# Patient Record
Sex: FEMALE | Race: WHITE | NOT HISPANIC OR LATINO | Employment: OTHER | URBAN - METROPOLITAN AREA
[De-identification: names, ages, dates, MRNs, and addresses within clinical notes are randomized per-mention and may not be internally consistent; named-entity substitution may affect disease eponyms.]

---

## 2017-01-02 ENCOUNTER — OFFICE VISIT (OUTPATIENT)
Dept: URGENT CARE | Facility: PHYSICIAN GROUP | Age: 77
End: 2017-01-02
Payer: MEDICARE

## 2017-01-02 VITALS
RESPIRATION RATE: 16 BRPM | BODY MASS INDEX: 24.81 KG/M2 | HEIGHT: 61 IN | HEART RATE: 52 BPM | WEIGHT: 131.4 LBS | SYSTOLIC BLOOD PRESSURE: 122 MMHG | TEMPERATURE: 98.2 F | DIASTOLIC BLOOD PRESSURE: 72 MMHG | OXYGEN SATURATION: 100 %

## 2017-01-02 DIAGNOSIS — M10.9 GOUT, ARTHRITIS: ICD-10-CM

## 2017-01-02 PROCEDURE — 99214 OFFICE O/P EST MOD 30 MIN: CPT | Performed by: PHYSICIAN ASSISTANT

## 2017-01-02 RX ORDER — METHYLPREDNISOLONE 4 MG/1
TABLET ORAL
Qty: 21 TAB | Refills: 0 | Status: SHIPPED | OUTPATIENT
Start: 2017-01-02 | End: 2017-05-22 | Stop reason: SDUPTHER

## 2017-01-02 NOTE — PATIENT INSTRUCTIONS
Gout  Gout is an inflammatory arthritis caused by a buildup of uric acid crystals in the joints. Uric acid is a chemical that is normally present in the blood. When the level of uric acid in the blood is too high it can form crystals that deposit in your joints and tissues. This causes joint redness, soreness, and swelling (inflammation). Repeat attacks are common. Over time, uric acid crystals can form into masses (tophi) near a joint, destroying bone and causing disfigurement. Gout is treatable and often preventable.  CAUSES   The disease begins with elevated levels of uric acid in the blood. Uric acid is produced by your body when it breaks down a naturally found substance called purines. Certain foods you eat, such as meats and fish, contain high amounts of purines. Causes of an elevated uric acid level include:  · Being passed down from parent to child (heredity).  · Diseases that cause increased uric acid production (such as obesity, psoriasis, and certain cancers).  · Excessive alcohol use.  · Diet, especially diets rich in meat and seafood.  · Medicines, including certain cancer-fighting medicines (chemotherapy), water pills (diuretics), and aspirin.  · Chronic kidney disease. The kidneys are no longer able to remove uric acid well.  · Problems with metabolism.  Conditions strongly associated with gout include:  · Obesity.  · High blood pressure.  · High cholesterol.  · Diabetes.  Not everyone with elevated uric acid levels gets gout. It is not understood why some people get gout and others do not. Surgery, joint injury, and eating too much of certain foods are some of the factors that can lead to gout attacks.  SYMPTOMS   · An attack of gout comes on quickly. It causes intense pain with redness, swelling, and warmth in a joint.  · Fever can occur.  · Often, only one joint is involved. Certain joints are more commonly involved:  ¨ Base of the big toe.  ¨ Knee.  ¨ Ankle.  ¨ Wrist.  ¨ Finger.  Without  treatment, an attack usually goes away in a few days to weeks. Between attacks, you usually will not have symptoms, which is different from many other forms of arthritis.  DIAGNOSIS   Your caregiver will suspect gout based on your symptoms and exam. In some cases, tests may be recommended. The tests may include:  · Blood tests.  · Urine tests.  · X-rays.  · Joint fluid exam. This exam requires a needle to remove fluid from the joint (arthrocentesis). Using a microscope, gout is confirmed when uric acid crystals are seen in the joint fluid.  TREATMENT   There are two phases to gout treatment: treating the sudden onset (acute) attack and preventing attacks (prophylaxis).  · Treatment of an Acute Attack.  ¨ Medicines are used. These include anti-inflammatory medicines or steroid medicines.  ¨ An injection of steroid medicine into the affected joint is sometimes necessary.  ¨ The painful joint is rested. Movement can worsen the arthritis.  ¨ You may use warm or cold treatments on painful joints, depending which works best for you.  · Treatment to Prevent Attacks.  ¨ If you suffer from frequent gout attacks, your caregiver may advise preventive medicine. These medicines are started after the acute attack subsides. These medicines either help your kidneys eliminate uric acid from your body or decrease your uric acid production. You may need to stay on these medicines for a very long time.  ¨ The early phase of treatment with preventive medicine can be associated with an increase in acute gout attacks. For this reason, during the first few months of treatment, your caregiver may also advise you to take medicines usually used for acute gout treatment. Be sure you understand your caregiver's directions. Your caregiver may make several adjustments to your medicine dose before these medicines are effective.  ¨ Discuss dietary treatment with your caregiver or dietitian. Alcohol and drinks high in sugar and fructose and foods  such as meat, poultry, and seafood can increase uric acid levels. Your caregiver or dietitian can advise you on drinks and foods that should be limited.  HOME CARE INSTRUCTIONS   · Do not take aspirin to relieve pain. This raises uric acid levels.  · Only take over-the-counter or prescription medicines for pain, discomfort, or fever as directed by your caregiver.  · Rest the joint as much as possible. When in bed, keep sheets and blankets off painful areas.  · Keep the affected joint raised (elevated).  · Apply warm or cold treatments to painful joints. Use of warm or cold treatments depends on which works best for you.  · Use crutches if the painful joint is in your leg.  · Drink enough fluids to keep your urine clear or pale yellow. This helps your body get rid of uric acid. Limit alcohol, sugary drinks, and fructose drinks.  · Follow your dietary instructions. Pay careful attention to the amount of protein you eat. Your daily diet should emphasize fruits, vegetables, whole grains, and fat-free or low-fat milk products. Discuss the use of coffee, vitamin C, and cherries with your caregiver or dietitian. These may be helpful in lowering uric acid levels.  · Maintain a healthy body weight.  SEEK MEDICAL CARE IF:   · You develop diarrhea, vomiting, or any side effects from medicines.  · You do not feel better in 24 hours, or you are getting worse.  SEEK IMMEDIATE MEDICAL CARE IF:   · Your joint becomes suddenly more tender, and you have chills or a fever.  MAKE SURE YOU:   · Understand these instructions.  · Will watch your condition.  · Will get help right away if you are not doing well or get worse.     This information is not intended to replace advice given to you by your health care provider. Make sure you discuss any questions you have with your health care provider.     Document Released: 12/15/2001 Document Revised: 01/08/2016 Document Reviewed: 07/31/2013  [employer] Interactive Patient Education ©2016  [employer]

## 2017-01-02 NOTE — MR AVS SNAPSHOT
"        Danielle Jon   2017 8:00 AM   Office Visit   MRN: [id number]    Department:  [facility]   Dept Phone:  [phone]    Description:  Female : 1940   Provider:  Layla Hurd PA-C           Reason for Visit     Foot Problem hx bunions, left foot shart pains x1week      Allergies as of 2017     Allergen Noted Reactions    Niacin 2016   Rash, Swelling    Rash/itching/swelling      You were diagnosed with     Gout, arthritis   [[id number]]         Vital Signs     Blood Pressure Pulse Temperature Respirations Height Weight    122/72 mmHg 52 36.8 °C (98.2 °F) 16 1.549 m (5' 0.98\") 59.603 kg (131 lb 6.4 oz)    Body Mass Index Oxygen Saturation Smoking Status             24.84 kg/m2 100% Never Smoker          Basic Information     Date Of Birth Sex Race Ethnicity Preferred Language    1940 Female White Non- English      Problem List              ICD-10-CM Priority Class Noted - Resolved    Essential hypertension I10   2016 - Present    Hypothyroidism E03.9   2016 - Present    Routine health maintenance Z00.00   2016 - Present      Health Maintenance        Date Due Completion Dates    IMM DTaP/Tdap/Td Vaccine (1 - Tdap) 9/15/1960 ---    PAP SMEAR 9/15/1962 ---    MAMMOGRAM 9/15/1981 ---    COLONOSCOPY 9/15/1991 ---    IMM ZOSTER VACCINE 9/15/2001 ---    BONE DENSITY 9/15/2006 ---    IMM PNEUMOCOCCAL 65+ (ADULT) LOW/MEDIUM RISK SERIES (1 of 2 - PCV13) 9/15/2006 ---    IMM INFLUENZA (1) 2016 ---            Current Immunizations     No immunizations on file.      Below and/or attached are the medications your provider expects you to take. Review all of your home medications and newly ordered medications with your provider and/or pharmacist. Follow medication instructions as directed by your provider and/or pharmacist. Please keep your medication list with you and share with your provider. Update the information when medications are discontinued, doses are " changed, or new medications (including over-the-counter products) are added; and carry medication information at all times in the event of emergency situations     Allergies:  NIACIN - Rash,Swelling               Medications  Valid as of: January 02, 2017 -  8:27 AM    Generic Name Brand Name Tablet Size Instructions for use    Aspirin (Tab) aspirin 81 MG Take 81 mg by mouth every day.        Atenolol (Tab) TENORMIN 50 MG TAKE 1 TABLET BY MOUTH TWICE DAILY**GENERIC FOR TENORMIN        Cholecalciferol (Cap) Vitamin D3 1000 UNITS Take 2,000 Units by mouth every day.        Ibuprofen (Tab) MOTRIN 200 MG Take 200 mg by mouth every 6 hours as needed.        Levothyroxine Sodium (Tab) SYNTHROID 50 MCG Take 1 Tab by mouth Every morning on an empty stomach. Indications: Underactive Thyroid        Losartan Potassium (Tab) COZAAR 100 MG Take 1 Tab by mouth every day.        Triamterene-HCTZ (Cap) triamterene/hctz 50-25 MG Take 1 Cap by mouth every morning.        Triamterene-HCTZ (Cap) MAXZIDE-25/DYAZIDE 37.5-25 MG Take 1 Cap by mouth every morning.        .                 Medicines prescribed today were sent to:     [id number] - [place], [place] - [id number] [facility] [address]    Phone: [phone] Fax: [phone]    Open 24 Hours?: No      Medication refill instructions:       If your prescription bottle indicates you have medication refills left, it is not necessary to call your provider’s office. Please contact your pharmacy and they will refill your medication.    If your prescription bottle indicates you do not have any refills left, you may request refills at any time through one of the following ways: The online [employer] system (except Urgent Care), by calling your provider’s office, or by asking your pharmacy to contact your provider’s office with a refill request. Medication refills are processed only during regular business hours and may not be available until the next  business day. Your provider may request additional information or to have a follow-up visit with you prior to refilling your medication.   *Please Note: Medication refills are assigned a new Rx number when refilled electronically. Your pharmacy may indicate that no refills were authorized even though a new prescription for the same medication is available at the pharmacy. Please request the medicine by name with the pharmacy before contacting your provider for a refill.        Instructions    Gout  Gout is an inflammatory arthritis caused by a buildup of uric acid crystals in the joints. Uric acid is a chemical that is normally present in the blood. When the level of uric acid in the blood is too high it can form crystals that deposit in your joints and tissues. This causes joint redness, soreness, and swelling (inflammation). Repeat attacks are common. Over time, uric acid crystals can form into masses (tophi) near a joint, destroying bone and causing disfigurement. Gout is treatable and often preventable.  CAUSES   The disease begins with elevated levels of uric acid in the blood. Uric acid is produced by your body when it breaks down a naturally found substance called purines. Certain foods you eat, such as meats and fish, contain high amounts of purines. Causes of an elevated uric acid level include:  · Being passed down from parent to child (heredity).  · Diseases that cause increased uric acid production (such as obesity, psoriasis, and certain cancers).  · Excessive alcohol use.  · Diet, especially diets rich in meat and seafood.  · Medicines, including certain cancer-fighting medicines (chemotherapy), water pills (diuretics), and aspirin.  · Chronic kidney disease. The kidneys are no longer able to remove uric acid well.  · Problems with metabolism.  Conditions strongly associated with gout include:  · Obesity.  · High blood pressure.  · High cholesterol.  · Diabetes.  Not everyone with elevated uric acid  levels gets gout. It is not understood why some people get gout and others do not. Surgery, joint injury, and eating too much of certain foods are some of the factors that can lead to gout attacks.  SYMPTOMS   · An attack of gout comes on quickly. It causes intense pain with redness, swelling, and warmth in a joint.  · Fever can occur.  · Often, only one joint is involved. Certain joints are more commonly involved:  ¨ Base of the big toe.  ¨ Knee.  ¨ Ankle.  ¨ Wrist.  ¨ Finger.  Without treatment, an attack usually goes away in a few days to weeks. Between attacks, you usually will not have symptoms, which is different from many other forms of arthritis.  DIAGNOSIS   Your caregiver will suspect gout based on your symptoms and exam. In some cases, tests may be recommended. The tests may include:  · Blood tests.  · Urine tests.  · X-rays.  · Joint fluid exam. This exam requires a needle to remove fluid from the joint (arthrocentesis). Using a microscope, gout is confirmed when uric acid crystals are seen in the joint fluid.  TREATMENT   There are two phases to gout treatment: treating the sudden onset (acute) attack and preventing attacks (prophylaxis).  · Treatment of an Acute Attack.  ¨ Medicines are used. These include anti-inflammatory medicines or steroid medicines.  ¨ An injection of steroid medicine into the affected joint is sometimes necessary.  ¨ The painful joint is rested. Movement can worsen the arthritis.  ¨ You may use warm or cold treatments on painful joints, depending which works best for you.  · Treatment to Prevent Attacks.  ¨ If you suffer from frequent gout attacks, your caregiver may advise preventive medicine. These medicines are started after the acute attack subsides. These medicines either help your kidneys eliminate uric acid from your body or decrease your uric acid production. You may need to stay on these medicines for a very long time.  ¨ The early phase of treatment with preventive  medicine can be associated with an increase in acute gout attacks. For this reason, during the first few months of treatment, your caregiver may also advise you to take medicines usually used for acute gout treatment. Be sure you understand your caregiver's directions. Your caregiver may make several adjustments to your medicine dose before these medicines are effective.  ¨ Discuss dietary treatment with your caregiver or dietitian. Alcohol and drinks high in sugar and fructose and foods such as meat, poultry, and seafood can increase uric acid levels. Your caregiver or dietitian can advise you on drinks and foods that should be limited.  HOME CARE INSTRUCTIONS   · Do not take aspirin to relieve pain. This raises uric acid levels.  · Only take over-the-counter or prescription medicines for pain, discomfort, or fever as directed by your caregiver.  · Rest the joint as much as possible. When in bed, keep sheets and blankets off painful areas.  · Keep the affected joint raised (elevated).  · Apply warm or cold treatments to painful joints. Use of warm or cold treatments depends on which works best for you.  · Use crutches if the painful joint is in your leg.  · Drink enough fluids to keep your urine clear or pale yellow. This helps your body get rid of uric acid. Limit alcohol, sugary drinks, and fructose drinks.  · Follow your dietary instructions. Pay careful attention to the amount of protein you eat. Your daily diet should emphasize fruits, vegetables, whole grains, and fat-free or low-fat milk products. Discuss the use of coffee, vitamin C, and cherries with your caregiver or dietitian. These may be helpful in lowering uric acid levels.  · Maintain a healthy body weight.  SEEK MEDICAL CARE IF:   · You develop diarrhea, vomiting, or any side effects from medicines.  · You do not feel better in 24 hours, or you are getting worse.  SEEK IMMEDIATE MEDICAL CARE IF:   · Your joint becomes suddenly more tender, and you  have chills or a fever.  MAKE SURE YOU:   · Understand these instructions.  · Will watch your condition.  · Will get help right away if you are not doing well or get worse.     This information is not intended to replace advice given to you by your health care provider. Make sure you discuss any questions you have with your health care provider.     Document Released: 12/15/2001 Document Revised: 01/08/2016 Document Reviewed: 07/31/2013  [employer] Interactive Patient Education ©2016 [employer]            [employer] Access Code: Activation code not generated  Current [employer] Status: Active

## 2017-01-02 NOTE — PROGRESS NOTES
Chief Complaint   Patient presents with   • Foot Problem     hx bunions, left foot shart pains x1week       HISTORY OF PRESENT ILLNESS: Patient is a 76 y.o. female who presents today for 1 week of waxing and waning severity of left foot pain.  Started in the ball of her foot and radiates throughout her foot and into her lower leg.  She was suspicious of gout due to the location at the base of big toe and her  having similar history.   She has never had gout before.  She has no hx of DM and did not injure her foot in any way.  She has not noticed rash.  No numbness or tingling.  Hurts much more to walk on and put pressure than anything else.  She did eat seafood and drink more alcohol slightly than normal due to the holidays.  She states that she did take NSAIDs and there was mild improvement overall.      Patient Active Problem List    Diagnosis Date Noted   • Routine health maintenance 02/21/2016   • Essential hypertension 02/19/2016   • Hypothyroidism 02/19/2016       Allergies:Niacin    Current Outpatient Prescriptions Ordered in [facility]   Medication Sig Dispense Refill   • losartan (COZAAR) 100 MG Tab Take 1 Tab by mouth every day. 90 Tab 0   • atenolol (TENORMIN) 50 MG Tab TAKE 1 TABLET BY MOUTH TWICE DAILY**GENERIC FOR TENORMIN 180 Tab 1   • triamterene/hctz (MAXZIDE-25/DYAZIDE) 37.5-25 MG Cap Take 1 Cap by mouth every morning. 90 Cap 3   • levothyroxine (SYNTHROID) 50 MCG Tab Take 1 Tab by mouth Every morning on an empty stomach. Indications: Underactive Thyroid 90 Tab 3   • aspirin 81 MG tablet Take 81 mg by mouth every day.     • ibuprofen (MOTRIN) 200 MG Tab Take 200 mg by mouth every 6 hours as needed.     • Cholecalciferol (VITAMIN D3) 1000 UNITS Cap Take 2,000 Units by mouth every day.     • triamterene/hctz 50-25 MG Cap Take 1 Cap by mouth every morning.       No current [facility]-ordered facility-administered medications on file.       Past Medical History   Diagnosis Date   • Hypertension    • Thyroid  "disease        Social History   Substance Use Topics   • Smoking status: Never Smoker    • Smokeless tobacco: Never Used   • Alcohol Use: 0.0 oz/week     0 Standard drinks or equivalent per week      Comment: occasionally        Family Status   Relation Status Death Age   • Mother  89     chf   • Father  69     emphem   • Sister Alive      encephalitis, memory care center   • Brother Alive      Family History   Problem Relation Age of Onset   • Hypertension Mother    • Heart Disease Mother      chf   • Lung Disease Father    • Hypertension Sister    • Hyperlipidemia Sister    • Hypertension Brother        ROS:  Review of Systems   Constitutional: Negative for fever, chills, weight loss and malaise/fatigue.   HENT: Negative for ear pain, nosebleeds, congestion, sore throat and neck pain.    Eyes: Negative for blurred vision.   Respiratory: Negative for cough, sputum production, shortness of breath and wheezing.    Cardiovascular: Negative for chest pain, palpitations, orthopnea and leg swelling.   Gastrointestinal: Negative for heartburn, nausea, vomiting and abdominal pain.   All other systems reviewed and are negative.     Exam:  Blood pressure 122/72, pulse 52, temperature 36.8 °C (98.2 °F), resp. rate 16, height 1.549 m (5' 0.98\"), weight 59.603 kg (131 lb 6.4 oz), SpO2 100 %.  General:  Well nourished, well developed female in NAD  Eyes: PERRLA, EOM within normal limits, no conjunctival injection, no scleral icterus, visual fields and acuity grossly intact.  Ears: Normal shape and symmetry, no tenderness, no discharge. External canals are without any significant edema or erythema. Tympanic membranes are without any inflammation, no effusion. Gross auditory acuity is intact  Nose: Symmetrical, sinuses without tenderness, no discharge.   Mouth: reasonable hygiene, no erythema exudates or tonsillar enlargement.  Neck: no masses, range of motion within normal limits, no tracheal deviation. No " lymphadenopathy  Pulmonary: Normal respiratory effort, no wheezes, crackles, or rhonchi.  Cardiovascular: regular rate and rhythm without murmurs, rubs, or gallops.  Abdomen: Soft, nontender, nondistended. Normal bowel sounds. No hepatosplenomegaly or masses, or hernias. No rebound or guarding.  Skin: No visible rashes or lesion. Warm, pink, dry.   Extremities: no clubbing, cyanosis, or edema.  Left foot: On inspection there is no swelling, ecchymosis or deformity. There is mild erythema at base of hallux and on plantar aspect.  She is exquisitely TTP in this area.  FROM of ankle.  DP 2+ bilaterally.  Normal cap refill and sensation throughout.   Neuro: A&O x 3. Speech normal/clear.  Normal gait.       Assessment/Plan:  1. Gout, arthritis  MethylPREDNISolone (MEDROL DOSEPAK) 4 MG Tablet Therapy Pack       -given location and presentation does appear consistent with gouty arthritis.  She is not getting much result for decent doses of Advil OTC.   -will trial Medrol dose pack, no hx of DM.   -gout dx discussed, education and diet.   -recommend she follow up with PCP on this and discuss need for any further tested if desired.       Supportive care, differential diagnoses, and indications for immediate follow-up discussed with patient.   Pathogenesis of diagnosis discussed including typical length and natural progression.   Instructed to return to clinic or nearest emergency department for any change in condition, further concerns, or worsening of symptoms.  Patient states understanding of the plan of care and discharge instructions.  Instructed to make an appointment, for follow up, with their primary care provider.      Layla Hurd PA-C

## 2017-02-27 DIAGNOSIS — I10 ESSENTIAL HYPERTENSION: ICD-10-CM

## 2017-02-27 RX ORDER — ATENOLOL 50 MG/1
50 TABLET ORAL 2 TIMES DAILY
Qty: 180 TAB | Refills: 0 | Status: SHIPPED | OUTPATIENT
Start: 2017-02-27 | End: 2017-06-05 | Stop reason: SDUPTHER

## 2017-02-27 RX ORDER — LOSARTAN POTASSIUM 100 MG/1
100 TABLET ORAL DAILY
Qty: 90 TAB | Refills: 0 | Status: SHIPPED | OUTPATIENT
Start: 2017-02-27 | End: 2017-06-05 | Stop reason: SDUPTHER

## 2017-02-27 NOTE — TELEPHONE ENCOUNTER
Was the patient seen in the last year in this department? Yes     Does patient have an active prescription for medications requested? No     Received Request Via: Pharmacy      Pt met protocol?: Yes, OV last month   BP Readings from Last 1 Encounters:   01/02/17 122/72

## 2017-03-09 DIAGNOSIS — E03.9 HYPOTHYROIDISM, UNSPECIFIED TYPE: ICD-10-CM

## 2017-03-22 ENCOUNTER — HOSPITAL ENCOUNTER (OUTPATIENT)
Dept: LAB | Facility: MEDICAL CENTER | Age: 77
End: 2017-03-22
Attending: NURSE PRACTITIONER
Payer: MEDICARE

## 2017-03-22 DIAGNOSIS — I10 ESSENTIAL HYPERTENSION: ICD-10-CM

## 2017-03-22 DIAGNOSIS — E03.9 HYPOTHYROIDISM, UNSPECIFIED TYPE: ICD-10-CM

## 2017-03-22 LAB
ALBUMIN SERPL BCP-MCNC: 4.3 G/DL (ref 3.2–4.9)
ALBUMIN/GLOB SERPL: 1.7 G/DL
ALP SERPL-CCNC: 35 U/L (ref 30–99)
ALT SERPL-CCNC: 21 U/L (ref 2–50)
ANION GAP SERPL CALC-SCNC: 6 MMOL/L (ref 0–11.9)
AST SERPL-CCNC: 20 U/L (ref 12–45)
BILIRUB SERPL-MCNC: 0.8 MG/DL (ref 0.1–1.5)
BUN SERPL-MCNC: 21 MG/DL (ref 8–22)
CALCIUM SERPL-MCNC: 10.1 MG/DL (ref 8.5–10.5)
CHLORIDE SERPL-SCNC: 103 MMOL/L (ref 96–112)
CHOLEST SERPL-MCNC: 200 MG/DL (ref 100–199)
CO2 SERPL-SCNC: 28 MMOL/L (ref 20–33)
CREAT SERPL-MCNC: 1.42 MG/DL (ref 0.5–1.4)
GLOBULIN SER CALC-MCNC: 2.6 G/DL (ref 1.9–3.5)
GLUCOSE SERPL-MCNC: 100 MG/DL (ref 65–99)
HDLC SERPL-MCNC: 38 MG/DL
LDLC SERPL CALC-MCNC: 102 MG/DL
POTASSIUM SERPL-SCNC: 3.9 MMOL/L (ref 3.6–5.5)
PROT SERPL-MCNC: 6.9 G/DL (ref 6–8.2)
SODIUM SERPL-SCNC: 137 MMOL/L (ref 135–145)
TRIGL SERPL-MCNC: 298 MG/DL (ref 0–149)
TSH SERPL DL<=0.005 MIU/L-ACNC: 3.37 UIU/ML (ref 0.3–3.7)

## 2017-03-22 PROCEDURE — 80053 COMPREHEN METABOLIC PANEL: CPT

## 2017-03-22 PROCEDURE — 80061 LIPID PANEL: CPT

## 2017-03-22 PROCEDURE — 84443 ASSAY THYROID STIM HORMONE: CPT

## 2017-03-22 PROCEDURE — 36415 COLL VENOUS BLD VENIPUNCTURE: CPT

## 2017-04-04 ENCOUNTER — OFFICE VISIT (OUTPATIENT)
Dept: MEDICAL GROUP | Facility: PHYSICIAN GROUP | Age: 77
End: 2017-04-04
Payer: MEDICARE

## 2017-04-04 VITALS
BODY MASS INDEX: 24.81 KG/M2 | RESPIRATION RATE: 16 BRPM | HEART RATE: 56 BPM | SYSTOLIC BLOOD PRESSURE: 138 MMHG | WEIGHT: 131.4 LBS | OXYGEN SATURATION: 99 % | DIASTOLIC BLOOD PRESSURE: 62 MMHG | TEMPERATURE: 97.5 F | HEIGHT: 61 IN

## 2017-04-04 DIAGNOSIS — I10 ESSENTIAL HYPERTENSION: ICD-10-CM

## 2017-04-04 DIAGNOSIS — E03.9 HYPOTHYROIDISM, UNSPECIFIED TYPE: ICD-10-CM

## 2017-04-04 DIAGNOSIS — E78.1 HYPERTRIGLYCERIDEMIA: ICD-10-CM

## 2017-04-04 DIAGNOSIS — L98.9 SKIN LESION: ICD-10-CM

## 2017-04-04 PROCEDURE — G8432 DEP SCR NOT DOC, RNG: HCPCS | Performed by: NURSE PRACTITIONER

## 2017-04-04 PROCEDURE — 1036F TOBACCO NON-USER: CPT | Performed by: NURSE PRACTITIONER

## 2017-04-04 PROCEDURE — 4040F PNEUMOC VAC/ADMIN/RCVD: CPT | Performed by: NURSE PRACTITIONER

## 2017-04-04 PROCEDURE — 1101F PT FALLS ASSESS-DOCD LE1/YR: CPT | Performed by: NURSE PRACTITIONER

## 2017-04-04 PROCEDURE — 3017F COLORECTAL CA SCREEN DOC REV: CPT | Mod: 8P | Performed by: NURSE PRACTITIONER

## 2017-04-04 PROCEDURE — 99214 OFFICE O/P EST MOD 30 MIN: CPT | Performed by: NURSE PRACTITIONER

## 2017-04-04 PROCEDURE — G8420 CALC BMI NORM PARAMETERS: HCPCS | Performed by: NURSE PRACTITIONER

## 2017-04-04 RX ORDER — GEMFIBROZIL 600 MG/1
600 TABLET, FILM COATED ORAL 2 TIMES DAILY
Qty: 60 TAB | Refills: 2 | Status: SHIPPED | OUTPATIENT
Start: 2017-04-04 | End: 2017-04-04

## 2017-04-04 RX ORDER — SIMVASTATIN 10 MG
10 TABLET ORAL EVERY EVENING
Qty: 30 TAB | Refills: 11 | Status: SHIPPED | OUTPATIENT
Start: 2017-04-04 | End: 2018-05-17 | Stop reason: SDUPTHER

## 2017-04-04 ASSESSMENT — PATIENT HEALTH QUESTIONNAIRE - PHQ9: CLINICAL INTERPRETATION OF PHQ2 SCORE: 0

## 2017-04-04 NOTE — MR AVS SNAPSHOT
"Francee Danay   2017 8:00 AM   Office Visit   MRN: [id number]    Department:  [facility]   Dept Phone:  [phone]    Description:  Female : 1940   Provider:  FERDINAND Adams           Reason for Visit     Results labs       Allergies as of 2017     Allergen Noted Reactions    Niacin 2016   Rash, Swelling    Rash/itching/swelling      You were diagnosed with     Hypertriglyceridemia   [516810]       Essential hypertension   [4382237]       Hypothyroidism, unspecified type   [5279377]         Vital Signs     Blood Pressure Pulse Temperature Respirations Height Weight    138/62 mmHg 56 36.4 °C (97.5 °F) 16 1.549 m (5' 0.98\") 59.603 kg (131 lb 6.4 oz)    Body Mass Index Oxygen Saturation Smoking Status             24.84 kg/m2 99% Never Smoker          Basic Information     Date Of Birth Sex Race Ethnicity Preferred Language    1940 Female White Non- English      Problem List              ICD-10-CM Priority Class Noted - Resolved    Essential hypertension I10   2016 - Present    Hypothyroidism E03.9   2016 - Present    Routine health maintenance Z00.00   2016 - Present    Hypertriglyceridemia E78.1   2017 - Present      Health Maintenance        Date Due Completion Dates    IMM DTaP/Tdap/Td Vaccine (1 - Tdap) 9/15/1960 ---    PAP SMEAR 9/15/1962 ---    IMM ZOSTER VACCINE 9/15/2001 ---    IMM PNEUMOCOCCAL 65+ (ADULT) LOW/MEDIUM RISK SERIES (1 of 2 - PCV13) 9/15/2006 ---            Current Immunizations     13-VALENT PCV PREVNAR 2015, 8/10/2015    Dtap Vaccine 2000    Influenza TIV (IM) 2015, 2012, 2009, 2007    Influenza Vaccine Adult HD 2013    Pneumococcal polysaccharide vaccine (PPSV-23) 2007    SHINGLES VACCINE 7/10/2007    Tdap Vaccine 2010      Below and/or attached are the medications your provider expects you to take. Review all of your home medications and newly ordered medications " with your provider and/or pharmacist. Follow medication instructions as directed by your provider and/or pharmacist. Please keep your medication list with you and share with your provider. Update the information when medications are discontinued, doses are changed, or new medications (including over-the-counter products) are added; and carry medication information at all times in the event of emergency situations     Allergies:  NIACIN - Rash,Swelling               Medications  Valid as of: April 04, 2017 -  8:31 AM    Generic Name Brand Name Tablet Size Instructions for use    Aspirin (Tab) aspirin 81 MG Take 81 mg by mouth every day.        Atenolol (Tab) TENORMIN 50 MG Take 1 Tab by mouth 2 times a day.        Cholecalciferol (Cap) Vitamin D3 1000 UNITS Take 2,000 Units by mouth every day.        Ibuprofen (Tab) MOTRIN 200 MG Take 200 mg by mouth every 6 hours as needed.        Levothyroxine Sodium (Tab) SYNTHROID 50 MCG Take 1 Tab by mouth Every morning on an empty stomach. Indications: Underactive Thyroid        Losartan Potassium (Tab) COZAAR 100 MG Take 1 Tab by mouth every day.        MethylPREDNISolone (Tablet Therapy Pack) MEDROL DOSEPAK 4 MG Take as directed        Simvastatin (Tab) ZOCOR 10 MG Take 1 Tab by mouth every evening.        Triamterene-HCTZ (Cap) triamterene/hctz 50-25 MG Take 1 Cap by mouth every morning.        Triamterene-HCTZ (Cap) MAXZIDE-25/DYAZIDE 37.5-25 MG Take 1 Cap by mouth every morning.        .                 Medicines prescribed today were sent to:     [id number] [place], [place] - [address] [address] [facility] [address]    Phone: [phone] Fax: [phone]    Open 24 Hours?: No      Medication refill instructions:       If your prescription bottle indicates you have medication refills left, it is not necessary to call your provider’s office. Please contact your pharmacy and they will refill your medication.    If your prescription bottle  indicates you do not have any refills left, you may request refills at any time through one of the following ways: The online [employer] system (except Urgent Care), by calling your provider’s office, or by asking your pharmacy to contact your provider’s office with a refill request. Medication refills are processed only during regular business hours and may not be available until the next business day. Your provider may request additional information or to have a follow-up visit with you prior to refilling your medication.   *Please Note: Medication refills are assigned a new Rx number when refilled electronically. Your pharmacy may indicate that no refills were authorized even though a new prescription for the same medication is available at the pharmacy. Please request the medicine by name with the pharmacy before contacting your provider for a refill.        Your To Do List     Future Labs/Procedures Complete By Expires    COMP METABOLIC PANEL  As directed 4/5/2018    LIPID PROFILE  As directed 4/5/2018      Instructions    Simvastatin tablets  What is this medicine?  SIMVASTATIN ([facility] stat in) is known as a HMG-CoA reductase inhibitor or 'statin'. It lowers the level of cholesterol and triglycerides in the blood. This drug may also reduce the risk of heart attack, stroke, or other health problems in patients with risk factors for heart or blood vessel disease. Diet and lifestyle changes are often used with this drug.  This medicine may be used for other purposes; ask your health care provider or pharmacist if you have questions.  COMMON BRAND NAME(S): Zocor  What should I tell my health care provider before I take this medicine?  They need to know if you have any of these conditions:  -frequently drink alcoholic beverages  -kidney disease  -liver disease  -muscle aches or weakness  -other medical condition  -an unusual or allergic reaction to simvastatin, other medicines, foods, dyes, or preservatives  -pregnant  or trying to get pregnant  -breast-feeding  How should I use this medicine?  Take this medicine by mouth with a glass of water. Follow the directions on the prescription label. You can take this medicine with or without food. Take your doses at regular intervals. Do not take your medicine more often than directed.  Talk to your pediatrician regarding the use of this medicine in children. Special care may be needed.  Overdosage: If you think you have taken too much of this medicine contact a poison control center or emergency room at once.  NOTE: This medicine is only for you. Do not share this medicine with others.  What if I miss a dose?  If you miss a dose, take it as soon as you can. If it is almost time for your next dose, take only that dose. Do not take double or extra doses.  What may interact with this medicine?  Do not take this medicine with any of the following medications:  -boceprevir  -cyclosporine  -danazol  -gemfibrozil  -medicines for fungal infections like itraconazole, ketoconazole, posaconazole, voriconazole  -medicines for HIV infection  -mifepristone, RU-486  -nefazodone  -red yeast rice  -some antibiotics like clarithromycin, erythromycin, telithromycin  -telaprevir  This medicine may also interact with the following medications:  -alcohol  -amiodarone  -amlodipine  -colchicine  -digoxin  -diltiazem  -dronedarone  -fluconazole  -grapefruit juice  -niacin  -ranolazine  -verapamil  -warfarin  This list may not describe all possible interactions. Give your health care provider a list of all the medicines, herbs, non-prescription drugs, or dietary supplements you use. Also tell them if you smoke, drink alcohol, or use illegal drugs. Some items may interact with your medicine.  What should I watch for while using this medicine?  Visit your doctor or health care professional for regular check-ups. You may need regular tests to make sure your liver is working properly.  Tell your doctor or health  care professional right away if you get any unexplained muscle pain, tenderness, or weakness, especially if you also have a fever and tiredness. Your doctor or health care professional may tell you to stop taking this medicine if you develop muscle problems. If your muscle problems do not go away after stopping this medicine, contact your health care professional.  This medicine may affect blood sugar levels. If you have diabetes, check with your doctor or health care professional before you change your diet or the dose of your diabetic medicine.  This drug is only part of a total heart-health program. Your doctor or a dietician can suggest a low-cholesterol and low-fat diet to help. Avoid alcohol and smoking, and keep a proper exercise schedule.  Do not use this drug if you are pregnant or breast-feeding. Serious side effects to an unborn child or to an infant are possible. Talk to your doctor or pharmacist for more information.  If you are going to have surgery tell your health care professional that you are taking this drug.  Some drugs may increase the risk of side effects from simvastatin. If you are given certain antibiotics or antifungals, your doctor or health care professional may stop simvastatin for a short time. Check with your doctor or pharmacist for advice.  What side effects may I notice from receiving this medicine?  Side effects that you should report to your doctor or health care professional as soon as possible:  -allergic reactions like skin rash, itching or hives, swelling of the face, lips, or tongue  -confusion  -dark urine  -fever  -joint pain  -loss of memory  -muscle cramps, pain  -redness, blistering, peeling or loosening of the skin, including inside the mouth  -trouble passing urine or change in the amount of urine  -unusually weak or tired  -yellowing of the eyes or skin  Side effects that usually do not require medical attention (report to your doctor or health care professional if  they continue or are bothersome):  -constipation  -heartburn  -stomach gas, pain, upset  This list may not describe all possible side effects. Call your doctor for medical advice about side effects. You may report side effects to FDA at 7-499-PRY-4800.  Where should I keep my medicine?  Keep out of the reach of children.  Store at room temperature below 30 degrees C (86 degrees F). Keep container tightly closed. Throw away any unused medicine after the expiration date.  NOTE: This sheet is a summary. It may not cover all possible information. If you have questions about this medicine, talk to your doctor, pharmacist, or health care provider.  © 2014, [employer]/Gold Standard. (11/6/2012 10:40:36 AM)              [employer] Access Code: Activation code not generated  Current [employer] Status: Active

## 2017-04-04 NOTE — ASSESSMENT & PLAN NOTE
Chronic condition: Patient has  hypothyroidism and is currently taking levothyroxine 50 µg daily without any side effects. She denies heart palpitations, fatigue, weight gain, cold intolerance, depression, dry skin, hair loss, constipation, weakness, headache, lethargy or panic . Last TSH March 22, 2017 was 3.370.

## 2017-04-04 NOTE — ASSESSMENT & PLAN NOTE
Chronic Condition: Patient has hypertriglyceridemia and is currently not taking any medication. She denies any chest pain, diaphoresis, shortness of breath, headaches, dizziness, blurred vision or myalgias. She was ordered gemfibrozil, but recalled that she previously lost eyesight with the medication and had to have surgery. She is willing to try a statin medication and repeat labs in 3 months. She has a documented allergy to niacin however when she explains her symptoms, it is flushing, not a rash.

## 2017-04-04 NOTE — ASSESSMENT & PLAN NOTE
Chronic condition: Patient has been treated for hypertension with losartan and atenolol blood pressure is well controlled. She denies any chest pain, diaphoresis, shortness of breath, headaches, dizziness or blurred vision. She does get some lower extremity edema but attributes this to gout.

## 2017-04-04 NOTE — PROGRESS NOTES
Subjective:     Chief Complaint   Patient presents with   • Results     labs        HPI:  Danielle Jon is a 76 y.o. female here today to discuss the following:    Essential hypertension  Chronic condition: Patient has been treated for hypertension with losartan and atenolol blood pressure is well controlled. She denies any chest pain, diaphoresis, shortness of breath, headaches, dizziness or blurred vision. She does get some lower extremity edema but attributes this to gout.    Hypertriglyceridemia  Chronic Condition: Patient has hypertriglyceridemia and is currently not taking any medication. She denies any chest pain, diaphoresis, shortness of breath, headaches, dizziness, blurred vision or myalgias. She was ordered gemfibrozil, but recalled that she previously lost eyesight with the medication and had to have surgery. She is willing to try a statin medication and repeat labs in 3 months. She has a documented allergy to niacin however when she explains her symptoms, it is flushing, not a rash.    Hypothyroidism  Chronic condition: Patient has  hypothyroidism and is currently taking levothyroxine 50 µg daily without any side effects. She denies heart palpitations, fatigue, weight gain, cold intolerance, depression, dry skin, hair loss, constipation, weakness, headache, lethargy or panic . Last TSH March 22, 2017 was 3.370.     Skin lesion  Patient reports a skin lesion that developed on her mid anterior chest and she is recently lost the scab           Current medicines (including changes today)  Current Outpatient Prescriptions   Medication Sig Dispense Refill   • simvastatin (ZOCOR) 10 MG Tab Take 1 Tab by mouth every evening. 30 Tab 11   • atenolol (TENORMIN) 50 MG Tab Take 1 Tab by mouth 2 times a day. 180 Tab 0   • losartan (COZAAR) 100 MG Tab Take 1 Tab by mouth every day. 90 Tab 0   • MethylPREDNISolone (MEDROL DOSEPAK) 4 MG Tablet Therapy Pack Take as directed 21 Tab 0   • triamterene/hctz  "(MAXZIDE-25/DYAZIDE) 37.5-25 MG Cap Take 1 Cap by mouth every morning. 90 Cap 3   • levothyroxine (SYNTHROID) 50 MCG Tab Take 1 Tab by mouth Every morning on an empty stomach. Indications: Underactive Thyroid 90 Tab 3   • aspirin 81 MG tablet Take 81 mg by mouth every day.     • ibuprofen (MOTRIN) 200 MG Tab Take 200 mg by mouth every 6 hours as needed.     • Cholecalciferol (VITAMIN D3) 1000 UNITS Cap Take 2,000 Units by mouth every day.     • triamterene/hctz 50-25 MG Cap Take 1 Cap by mouth every morning.       No current facility-administered medications for this visit.       She  has a past medical history of Hypertension and Thyroid disease.    ROS   Review of Systems   Constitutional: Negative for fever, chills, weight loss and malaise/fatigue.   HENT: Negative for ear pain, nosebleeds, congestion, sore throat and neck pain.    Respiratory: Negative for cough, sputum production, shortness of breath and wheezing.    Cardiovascular: Negative for chest pain, palpitations,  and leg swelling.   Gastrointestinal: Negative for heartburn, nausea, vomiting, diarrhea and abdominal pain.   Neurological: Negative for dizziness, tingling, tremors, sensory change, focal weakness and headaches.   Psychiatric/Behavioral: Negative for depression, anxiety, suicidal ideas, insomnia and memory loss.    Skin: Positive for skin lesion   All other systems reviewed and are negative except as in HPI.     Objective:   Physical Exam:  Blood pressure 138/62, pulse 56, temperature 36.4 °C (97.5 °F), resp. rate 16, height 1.549 m (5' 0.98\"), weight 59.603 kg (131 lb 6.4 oz), SpO2 99 %. Body mass index is 24.84 kg/(m^2).   Physical Exam:  Alert, oriented in no acute distress.  Eye contact is good, speech goal directed, affect calm  HEENT: conjunctiva non-injected, sclera non-icteric.  Pinna normal.   Neck No adenopathy or masses in the neck or supraclavicular regions.  Lungs: clear to auscultation bilaterally with good excursion.  CV: " regular rate and rhythm.   Abdomen: soft, nontender, No CVAT. Normal bowel sounds.  Ext: no edema, color normal, vascularity normal, temperature normal  MS: Normal gait and station  Skin: Approximately 1 mm erythematous lesion anterior upper chest.    Assessment and Plan:   The following treatment plan was discussed   1. Hypertriglyceridemia  simvastatin (ZOCOR) 10 MG Tab    COMP METABOLIC PANEL    LIPID PROFILE    DISCONTINUED: gemfibrozil (LOPID) 600 MG Tab   2. Essential hypertension     3. Hypothyroidism, unspecified type     4. Skin lesion         Followup: Return in about 3 months (around 7/4/2017) for HTN/Lipid.   Please note that this dictation was created using voice recognition software. I have made every reasonable attempt to correct obvious errors, but I expect that there are errors of grammar and possibly content that I did not discover before finalizing the note.

## 2017-04-04 NOTE — ASSESSMENT & PLAN NOTE
Patient reports a skin lesion that developed on her mid anterior chest and she is recently lost the scab

## 2017-04-04 NOTE — PATIENT INSTRUCTIONS
Simvastatin tablets  What is this medicine?  SIMVASTATIN ([facility] stat in) is known as a HMG-CoA reductase inhibitor or 'statin'. It lowers the level of cholesterol and triglycerides in the blood. This drug may also reduce the risk of heart attack, stroke, or other health problems in patients with risk factors for heart or blood vessel disease. Diet and lifestyle changes are often used with this drug.  This medicine may be used for other purposes; ask your health care provider or pharmacist if you have questions.  COMMON BRAND NAME(S): Nikki  What should I tell my health care provider before I take this medicine?  They need to know if you have any of these conditions:  -frequently drink alcoholic beverages  -kidney disease  -liver disease  -muscle aches or weakness  -other medical condition  -an unusual or allergic reaction to simvastatin, other medicines, foods, dyes, or preservatives  -pregnant or trying to get pregnant  -breast-feeding  How should I use this medicine?  Take this medicine by mouth with a glass of water. Follow the directions on the prescription label. You can take this medicine with or without food. Take your doses at regular intervals. Do not take your medicine more often than directed.  Talk to your pediatrician regarding the use of this medicine in children. Special care may be needed.  Overdosage: If you think you have taken too much of this medicine contact a poison control center or emergency room at once.  NOTE: This medicine is only for you. Do not share this medicine with others.  What if I miss a dose?  If you miss a dose, take it as soon as you can. If it is almost time for your next dose, take only that dose. Do not take double or extra doses.  What may interact with this medicine?  Do not take this medicine with any of the following medications:  -boceprevir  -cyclosporine  -danazol  -gemfibrozil  -medicines for fungal infections like itraconazole, ketoconazole, posaconazole,  voriconazole  -medicines for HIV infection  -mifepristone, RU-486  -nefazodone  -red yeast rice  -some antibiotics like clarithromycin, erythromycin, telithromycin  -telaprevir  This medicine may also interact with the following medications:  -alcohol  -amiodarone  -amlodipine  -colchicine  -digoxin  -diltiazem  -dronedarone  -fluconazole  -grapefruit juice  -niacin  -ranolazine  -verapamil  -warfarin  This list may not describe all possible interactions. Give your health care provider a list of all the medicines, herbs, non-prescription drugs, or dietary supplements you use. Also tell them if you smoke, drink alcohol, or use illegal drugs. Some items may interact with your medicine.  What should I watch for while using this medicine?  Visit your doctor or health care professional for regular check-ups. You may need regular tests to make sure your liver is working properly.  Tell your doctor or health care professional right away if you get any unexplained muscle pain, tenderness, or weakness, especially if you also have a fever and tiredness. Your doctor or health care professional may tell you to stop taking this medicine if you develop muscle problems. If your muscle problems do not go away after stopping this medicine, contact your health care professional.  This medicine may affect blood sugar levels. If you have diabetes, check with your doctor or health care professional before you change your diet or the dose of your diabetic medicine.  This drug is only part of a total heart-health program. Your doctor or a dietician can suggest a low-cholesterol and low-fat diet to help. Avoid alcohol and smoking, and keep a proper exercise schedule.  Do not use this drug if you are pregnant or breast-feeding. Serious side effects to an unborn child or to an infant are possible. Talk to your doctor or pharmacist for more information.  If you are going to have surgery tell your health care professional that you are taking  this drug.  Some drugs may increase the risk of side effects from simvastatin. If you are given certain antibiotics or antifungals, your doctor or health care professional may stop simvastatin for a short time. Check with your doctor or pharmacist for advice.  What side effects may I notice from receiving this medicine?  Side effects that you should report to your doctor or health care professional as soon as possible:  -allergic reactions like skin rash, itching or hives, swelling of the face, lips, or tongue  -confusion  -dark urine  -fever  -joint pain  -loss of memory  -muscle cramps, pain  -redness, blistering, peeling or loosening of the skin, including inside the mouth  -trouble passing urine or change in the amount of urine  -unusually weak or tired  -yellowing of the eyes or skin  Side effects that usually do not require medical attention (report to your doctor or health care professional if they continue or are bothersome):  -constipation  -heartburn  -stomach gas, pain, upset  This list may not describe all possible side effects. Call your doctor for medical advice about side effects. You may report side effects to FDA at 1-421-FDA-6439.  Where should I keep my medicine?  Keep out of the reach of children.  Store at room temperature below 30 degrees C (86 degrees F). Keep container tightly closed. Throw away any unused medicine after the expiration date.  NOTE: This sheet is a summary. It may not cover all possible information. If you have questions about this medicine, talk to your doctor, pharmacist, or health care provider.  © 2014, [employer]/Gold Standard. (11/6/2012 10:40:36 AM)

## 2017-05-22 DIAGNOSIS — M10.9 GOUT, ARTHRITIS: ICD-10-CM

## 2017-05-23 RX ORDER — METHYLPREDNISOLONE 4 MG/1
TABLET ORAL
Qty: 21 TAB | Refills: 0 | Status: SHIPPED | OUTPATIENT
Start: 2017-05-23 | End: 2017-12-14 | Stop reason: SDUPTHER

## 2017-05-23 NOTE — TELEPHONE ENCOUNTER
Maura Yap,   I have a re-occurance of Gout and am experiencing severe pain in the ball of both feet and my lower right leg.  Would you please submit a refill of the Prednisolone to the [facility] for me.   Thank you,   Yanet

## 2017-06-01 ENCOUNTER — TELEPHONE (OUTPATIENT)
Dept: MEDICAL GROUP | Facility: PHYSICIAN GROUP | Age: 77
End: 2017-06-01

## 2017-06-01 NOTE — TELEPHONE ENCOUNTER
ESTABLISHED PATIENT PRE-VISIT PLANNING     Note: Patient will not be contacted if there is no indication to call.     1.  Reviewed note from last office visit with PCP and/or other med group provider: Yes    2.  If any orders were placed at last visit, do we have Results/Consult Notes?        •  Labs - Labs ordered, completed and results are in chart.       •  Imaging - Imaging was not ordered at last office visit.       •  Referrals - No referrals were ordered at last office visit.    3.  Immunizations were updated in Epic using WebIZ?: Epic matches WebIZ       •  Web [employer] Recommendations: Patient is up to date on all vaccines    4.  Patient is due for the following Health Maintenance Topics:   Health Maintenance Due   Topic Date Due   • Annual Wellness Visit  1940

## 2017-06-05 DIAGNOSIS — I10 ESSENTIAL HYPERTENSION: ICD-10-CM

## 2017-06-05 RX ORDER — ATENOLOL 50 MG/1
50 TABLET ORAL 2 TIMES DAILY
Qty: 180 TAB | Refills: 0 | Status: SHIPPED | OUTPATIENT
Start: 2017-06-05 | End: 2017-09-05 | Stop reason: SDUPTHER

## 2017-06-05 RX ORDER — TRIAMTERENE AND HYDROCHLOROTHIAZIDE 37.5; 25 MG/1; MG/1
1 CAPSULE ORAL EVERY MORNING
Qty: 90 CAP | Refills: 0 | Status: SHIPPED | OUTPATIENT
Start: 2017-06-05 | End: 2017-09-05 | Stop reason: SDUPTHER

## 2017-06-05 RX ORDER — LOSARTAN POTASSIUM 100 MG/1
100 TABLET ORAL DAILY
Qty: 90 TAB | Refills: 0 | Status: SHIPPED | OUTPATIENT
Start: 2017-06-05 | End: 2017-09-05 | Stop reason: SDUPTHER

## 2017-06-06 ENCOUNTER — OFFICE VISIT (OUTPATIENT)
Dept: MEDICAL GROUP | Facility: PHYSICIAN GROUP | Age: 77
End: 2017-06-06
Payer: MEDICARE

## 2017-06-06 VITALS
BODY MASS INDEX: 23.88 KG/M2 | RESPIRATION RATE: 14 BRPM | SYSTOLIC BLOOD PRESSURE: 138 MMHG | WEIGHT: 126.5 LBS | HEART RATE: 56 BPM | TEMPERATURE: 97.5 F | OXYGEN SATURATION: 99 % | HEIGHT: 61 IN | DIASTOLIC BLOOD PRESSURE: 62 MMHG

## 2017-06-06 DIAGNOSIS — M10.9 GOUT OF BOTH FEET: ICD-10-CM

## 2017-06-06 DIAGNOSIS — E78.1 HYPERTRIGLYCERIDEMIA: Chronic | ICD-10-CM

## 2017-06-06 DIAGNOSIS — E03.9 HYPOTHYROIDISM, UNSPECIFIED TYPE: ICD-10-CM

## 2017-06-06 DIAGNOSIS — M25.531 WRIST PAIN, RIGHT: ICD-10-CM

## 2017-06-06 DIAGNOSIS — I10 ESSENTIAL HYPERTENSION: Chronic | ICD-10-CM

## 2017-06-06 PROBLEM — M1A.0710 CHRONIC GOUT OF RIGHT FOOT: Status: ACTIVE | Noted: 2017-06-06

## 2017-06-06 PROCEDURE — G8420 CALC BMI NORM PARAMETERS: HCPCS | Performed by: NURSE PRACTITIONER

## 2017-06-06 PROCEDURE — 1101F PT FALLS ASSESS-DOCD LE1/YR: CPT | Performed by: NURSE PRACTITIONER

## 2017-06-06 PROCEDURE — 3017F COLORECTAL CA SCREEN DOC REV: CPT | Mod: 8P | Performed by: NURSE PRACTITIONER

## 2017-06-06 PROCEDURE — 1036F TOBACCO NON-USER: CPT | Performed by: NURSE PRACTITIONER

## 2017-06-06 PROCEDURE — 99214 OFFICE O/P EST MOD 30 MIN: CPT | Performed by: NURSE PRACTITIONER

## 2017-06-06 PROCEDURE — G8432 DEP SCR NOT DOC, RNG: HCPCS | Performed by: NURSE PRACTITIONER

## 2017-06-06 PROCEDURE — 4040F PNEUMOC VAC/ADMIN/RCVD: CPT | Performed by: NURSE PRACTITIONER

## 2017-06-06 RX ORDER — INDOMETHACIN 50 MG/1
50 CAPSULE ORAL 3 TIMES DAILY
Qty: 90 CAP | Refills: 2 | Status: SHIPPED | OUTPATIENT
Start: 2017-06-06 | End: 2018-01-19

## 2017-06-06 RX ORDER — METHYLPREDNISOLONE 4 MG/1
TABLET ORAL
Qty: 21 TAB | Refills: 0 | Status: SHIPPED | OUTPATIENT
Start: 2017-06-06 | End: 2017-06-12

## 2017-06-06 NOTE — ASSESSMENT & PLAN NOTE
Current condition: Patient's intubated for hypertension with losartan and atenolol and blood pressures well controlled. She denies any chest pain, diaphoresis, shortness of breath, headache dizziness or blurred vision. Medication refilled yesterday.

## 2017-06-06 NOTE — ASSESSMENT & PLAN NOTE
Current condition: Patient has hypothyroidism and is currently taking levothyroxine 50 µg daily. She denies any heart palpitations, fatigue, weight gain, cold intolerance, depression, dry skin, hair loss, constipation, weakness, headache or lethargy. Last TSH in March 2017 was 3.37. We will recheck labs in October 2017.

## 2017-06-06 NOTE — TELEPHONE ENCOUNTER
Was the patient seen in the last year in this department? Yes     Does patient have an active prescription for medications requested? No     Received Request Via: Pharmacy      Pt met protocol?: Yes    LAST OV 04/04/2017    BP Readings from Last 1 Encounters:   04/04/17 138/62

## 2017-06-06 NOTE — ASSESSMENT & PLAN NOTE
Patient has hypertriglyceridemia currently on Zocor 10 mg daily. She denies any chest pain, diaphoresis, palpitations, shortness of breath, dyspnea, headache or myalgias. We will check labs in October 2017

## 2017-06-06 NOTE — ASSESSMENT & PLAN NOTE
Patient reports that she had her second incident of gout involving both feet worse on the right big toe. She checked Medrol Dosepak with relief. She does not want to take daily preventative medication at this time. We discussed the use of Indocin and Medrol Dosepak and patient was prescribed medication for future use.

## 2017-06-06 NOTE — ASSESSMENT & PLAN NOTE
Patient reports tenderness in her right thumb and wrist tenderness along the vein for several months. She states that it became red and blotchy, but symptoms have resolved.

## 2017-06-07 NOTE — PROGRESS NOTES
Subjective:     Chief Complaint   Patient presents with   • Follow-Up     Lipids/HTN   • Gout       HPI:  Danielle Jon is a 76 y.o. female here today to discuss the following:    Essential hypertension  Current condition: Patient's intubated for hypertension with losartan and atenolol and blood pressures well controlled. She denies any chest pain, diaphoresis, shortness of breath, headache dizziness or blurred vision. Medication refilled yesterday.    Gout of both feet  Patient reports that she had her second incident of gout involving both feet worse on the right big toe. She checked Medrol Dosepak with relief. She does not want to take daily preventative medication at this time. We discussed the use of Indocin and Medrol Dosepak and patient was prescribed medication for future use.    Hypertriglyceridemia  Patient has hypertriglyceridemia currently on Zocor 10 mg daily. She denies any chest pain, diaphoresis, palpitations, shortness of breath, dyspnea, headache or myalgias. We will check labs in October 2017    Hypothyroidism  Current condition: Patient has hypothyroidism and is currently taking levothyroxine 50 µg daily. She denies any heart palpitations, fatigue, weight gain, cold intolerance, depression, dry skin, hair loss, constipation, weakness, headache or lethargy. Last TSH in March 2017 was 3.37. We will recheck labs in October 2017.    Wrist pain, right  Patient reports tenderness in her right thumb and wrist tenderness along the vein for several months. She states that it became red and blotchy, but symptoms have resolved.           Current medicines (including changes today)  Current Outpatient Prescriptions   Medication Sig Dispense Refill   • indomethacin (INDOCIN) 50 MG Cap Take 1 Cap by mouth 3 times a day. 90 Cap 2   • MethylPREDNISolone (MEDROL DOSEPAK) 4 MG Tablet Therapy Pack Use as directed 21 Tab 0   • triamterene/hctz (MAXZIDE-25/DYAZIDE) 37.5-25 MG Cap Take 1 Cap by mouth every morning.  "90 Cap 0   • losartan (COZAAR) 100 MG Tab Take 1 Tab by mouth every day. 90 Tab 0   • atenolol (TENORMIN) 50 MG Tab Take 1 Tab by mouth 2 times a day. 180 Tab 0   • MethylPREDNISolone (MEDROL DOSEPAK) 4 MG Tablet Therapy Pack Take as directed 21 Tab 0   • simvastatin (ZOCOR) 10 MG Tab Take 1 Tab by mouth every evening. 30 Tab 11   • levothyroxine (SYNTHROID) 50 MCG Tab Take 1 Tab by mouth Every morning on an empty stomach. Indications: Underactive Thyroid 90 Tab 3   • aspirin 81 MG tablet Take 81 mg by mouth every day.     • ibuprofen (MOTRIN) 200 MG Tab Take 200 mg by mouth every 6 hours as needed.     • Cholecalciferol (VITAMIN D3) 1000 UNITS Cap Take 2,000 Units by mouth every day.       No current facility-administered medications for this visit.       She  has a past medical history of Hypertension and Thyroid disease.    ROS   Review of Systems   Constitutional: Negative for fever, chills, weight loss and malaise/fatigue.   HENT: Negative for ear pain, nosebleeds, congestion, sore throat and neck pain.    Respiratory: Negative for cough, sputum production, shortness of breath and wheezing.    Cardiovascular: Negative for chest pain, palpitations,  and leg swelling.   Gastrointestinal: Negative for heartburn, nausea, vomiting, diarrhea and abdominal pain.   Neurological: Negative for dizziness, tingling, tremors, sensory change, focal weakness and headaches.   Psychiatric/Behavioral: Negative for depression, anxiety, suicidal ideas, insomnia and memory loss.    All other systems reviewed and are negative except as in HPI.     Objective:   Physical Exam:  Blood pressure 138/62, pulse 56, temperature 36.4 °C (97.5 °F), resp. rate 14, height 1.549 m (5' 0.98\"), weight 57.38 kg (126 lb 8 oz), SpO2 99 %. Body mass index is 23.91 kg/(m^2).   Physical Exam:  Alert, oriented in no acute distress.  Eye contact is good, speech goal directed, affect calm  HEENT: conjunctiva non-injected, sclera non-icteric.  Pinna normal. "   Neck No adenopathy or masses in the neck or supraclavicular regions.  Lungs: clear to auscultation bilaterally with good excursion.  CV: regular rate and rhythm.   Abdomen: soft, nontender, No CVAT. Normal bowel sounds.  Ext: no edema, color normal, vascularity normal, temperature normal  MS: Normal gait and station    Assessment and Plan:   The following treatment plan was discussed   1. Gout of both feet  indomethacin (INDOCIN) 50 MG Cap    MethylPREDNISolone (MEDROL DOSEPAK) 4 MG Tablet Therapy Pack    Medication refilled   2. Wrist pain, right      CContinue to monitor   3. Hypertriglyceridemia  COMP METABOLIC PANEL    LIPID PROFILE    Labs ordered   4. Hypothyroidism, unspecified type  TSH WITH REFLEX TO FT4    Labs ordered   5. Essential hypertension      Labs ordered       Followup: Return in about 6 months (around 12/6/2017) for Lab Review.   Please note that this dictation was created using voice recognition software. I have made every reasonable attempt to correct obvious errors, but I expect that there are errors of grammar and possibly content that I did not discover before finalizing the note.

## 2017-06-08 NOTE — TELEPHONE ENCOUNTER
Was the patient seen in the last year in this department? Yes 06/06/17    Does patient have an active prescription for medications requested? No     Received Request Via: Pharmacy

## 2017-06-09 RX ORDER — LEVOTHYROXINE SODIUM 0.05 MG/1
50 TABLET ORAL
Qty: 90 TAB | Refills: 2 | Status: SHIPPED | OUTPATIENT
Start: 2017-06-09 | End: 2018-03-06 | Stop reason: SDUPTHER

## 2017-06-09 NOTE — TELEPHONE ENCOUNTER
Was the patient seen in the last year in this department? Yes     Does patient have an active prescription for medications requested? No     Received Request Via: Pharmacy      Pt met protocol?: Yes, tsh labs 3/17 3.370 ov 6/6/17

## 2017-07-03 ENCOUNTER — OFFICE VISIT (OUTPATIENT)
Dept: URGENT CARE | Facility: PHYSICIAN GROUP | Age: 77
End: 2017-07-03
Payer: MEDICARE

## 2017-07-03 VITALS
WEIGHT: 127 LBS | OXYGEN SATURATION: 97 % | HEART RATE: 52 BPM | BODY MASS INDEX: 24.01 KG/M2 | SYSTOLIC BLOOD PRESSURE: 112 MMHG | DIASTOLIC BLOOD PRESSURE: 76 MMHG | RESPIRATION RATE: 16 BRPM | TEMPERATURE: 98.8 F

## 2017-07-03 DIAGNOSIS — T14.8XXA WOUND INFECTION: Primary | ICD-10-CM

## 2017-07-03 DIAGNOSIS — L08.9 WOUND INFECTION: Primary | ICD-10-CM
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
## 2017-07-03 PROCEDURE — 99214 OFFICE O/P EST MOD 30 MIN: CPT | Performed by: PHYSICIAN ASSISTANT

## 2017-07-03 RX ORDER — SULFAMETHOXAZOLE AND TRIMETHOPRIM 800; 160 MG/1; MG/1
1 TABLET ORAL 2 TIMES DAILY
Qty: 14 TAB | Refills: 0 | Status: SHIPPED | OUTPATIENT
Start: 2017-07-03 | End: 2017-07-10

## 2017-07-03 ASSESSMENT — ENCOUNTER SYMPTOMS: LEG PAIN: 1

## 2017-07-03 NOTE — MR AVS SNAPSHOT
Danielle Jon   7/3/2017 12:30 PM   Office Visit   MRN: [id number]    Department:  [facility]   Dept Phone:  [phone]    Description:  Female : 1940   Provider:  Miguel Anguiano PA-C           Reason for Visit     Leg Pain tripped and fell x3 days, has wound on right lower leg      Allergies as of 7/3/2017     Allergen Noted Reactions    Niacin 2016   Rash, Swelling    Rash/itching/swelling      You were diagnosed with     Fall, initial encounter   [[id number]]       Wound infection   [[id number]]         Vital Signs     Blood Pressure Pulse Temperature Respirations Weight Oxygen Saturation    112/76 mmHg 52 37.1 °C (98.8 °F) 16 57.607 kg (127 lb) 97%    Smoking Status                   Never Smoker            Basic Information     Date Of Birth Sex Race Ethnicity Preferred Language    1940 Female White Non- English      Problem List              ICD-10-CM Priority Class Noted - Resolved    Essential hypertension I10   2016 - Present    Hypothyroidism E03.9   2016 - Present    Routine health maintenance Z00.00   2016 - Present    Hypertriglyceridemia E78.1   2017 - Present    Skin lesion L98.9   2017 - Present    Gout of both feet M10.9   2017 - Present    Wrist pain, right M25.531   2017 - Present      Health Maintenance        Date Due Completion Dates    IMM INFLUENZA (1) 2017, 2013, 2012, 2009, 2007    IMM DTaP/Tdap/Td Vaccine (3 - Td) 2020, 2000            Current Immunizations     13-VALENT PCV PREVNAR 2015, 8/10/2015    Dtap Vaccine 2000    Influenza TIV (IM) 2015, 2012, 2009, 2007    Influenza Vaccine Adult HD 2013    Pneumococcal polysaccharide vaccine (PPSV-23) 2007    SHINGLES VACCINE 7/10/2007    Tdap Vaccine 2010      Below and/or attached are the medications your provider expects you to take. Review all of your home medications and  newly ordered medications with your provider and/or pharmacist. Follow medication instructions as directed by your provider and/or pharmacist. Please keep your medication list with you and share with your provider. Update the information when medications are discontinued, doses are changed, or new medications (including over-the-counter products) are added; and carry medication information at all times in the event of emergency situations     Allergies:  NIACIN - Rash,Swelling               Medications  Valid as of: July 03, 2017 -  1:27 PM    Generic Name Brand Name Tablet Size Instructions for use    Aspirin (Tab) aspirin 81 MG Take 81 mg by mouth every day.        Atenolol (Tab) TENORMIN 50 MG Take 1 Tab by mouth 2 times a day.        Cholecalciferol (Cap) Vitamin D3 1000 UNITS Take 2,000 Units by mouth every day.        Ibuprofen (Tab) MOTRIN 200 MG Take 200 mg by mouth every 6 hours as needed.        Indomethacin (Cap) INDOCIN 50 MG Take 1 Cap by mouth 3 times a day.        Levothyroxine Sodium (Tab) SYNTHROID 50 MCG Take 1 Tab by mouth Every morning on an empty stomach. Indications: Underactive Thyroid        Losartan Potassium (Tab) COZAAR 100 MG Take 1 Tab by mouth every day.        MethylPREDNISolone (Tablet Therapy Pack) MEDROL DOSEPAK 4 MG Take as directed        Simvastatin (Tab) ZOCOR 10 MG Take 1 Tab by mouth every evening.        Sulfamethoxazole-Trimethoprim (Tab) BACTRIM -160 MG Take 1 Tab by mouth 2 times a day for 7 days.        Triamterene-HCTZ (Cap) MAXZIDE-25/DYAZIDE 37.5-25 MG Take 1 Cap by mouth every morning.        .                 Medicines prescribed today were sent to:     [id number] - [place] - [address]    [address] [address]    Phone: [phone] Fax: [phone]    Open 24 Hours?: No      Medication refill instructions:       If your prescription bottle indicates you have medication refills left, it is not necessary to call your  provider’s office. Please contact your pharmacy and they will refill your medication.    If your prescription bottle indicates you do not have any refills left, you may request refills at any time through one of the following ways: The online [employer] system (except Urgent Care), by calling your provider’s office, or by asking your pharmacy to contact your provider’s office with a refill request. Medication refills are processed only during regular business hours and may not be available until the next business day. Your provider may request additional information or to have a follow-up visit with you prior to refilling your medication.   *Please Note: Medication refills are assigned a new Rx number when refilled electronically. Your pharmacy may indicate that no refills were authorized even though a new prescription for the same medication is available at the pharmacy. Please request the medicine by name with the pharmacy before contacting your provider for a refill.        Instructions    Elevate and check wound tonight.  Re-wrap if you are going to be outside.  Return in 2-3 days for wound recheck.    Cellulitis  Cellulitis is an infection of the skin and the tissue beneath it. The infected area is usually red and tender. Cellulitis occurs most often in the arms and lower legs.   CAUSES   Cellulitis is caused by bacteria that enter the skin through cracks or cuts in the skin. The most common types of bacteria that cause cellulitis are staphylococci and streptococci.  SIGNS AND SYMPTOMS   · Redness and warmth.  · Swelling.  · Tenderness or pain.  · Fever.  DIAGNOSIS   Your health care provider can usually determine what is wrong based on a physical exam. Blood tests may also be done.  TREATMENT   Treatment usually involves taking an antibiotic medicine.  HOME CARE INSTRUCTIONS   · Take your antibiotic medicine as directed by your health care provider. Finish the antibiotic even if you start to feel better.  · Keep  the infected arm or leg elevated to reduce swelling.  · Apply a warm cloth to the affected area up to 4 times per day to relieve pain.  · Take medicines only as directed by your health care provider.  · Keep all follow-up visits as directed by your health care provider.  SEEK MEDICAL CARE IF:   · You notice red streaks coming from the infected area.  · Your red area gets larger or turns dark in color.  · Your bone or joint underneath the infected area becomes painful after the skin has healed.  · Your infection returns in the same area or another area.  · You notice a swollen bump in the infected area.  · You develop new symptoms.  · You have a fever.  SEEK IMMEDIATE MEDICAL CARE IF:   · You feel very sleepy.  · You develop vomiting or diarrhea.  · You have a general ill feeling (malaise) with muscle aches and pains.  MAKE SURE YOU:   · Understand these instructions.  · Will watch your condition.  · Will get help right away if you are not doing well or get worse.     This information is not intended to replace advice given to you by your health care provider. Make sure you discuss any questions you have with your health care provider.     Document Released: 09/27/2006 Document Revised: 01/08/2016 Document Reviewed: 03/04/2013  [employer] Interactive Patient Education ©2016 [employer]vier Inc[employer]            [email or web address] Access Code: Activation code not generated  Current naaptol Status: Active

## 2017-07-03 NOTE — PROGRESS NOTES
Subjective:      Danielle Jon is a 76 y.o. female who presents with Leg Pain            Leg Pain      Chief Complaint   Patient presents with   • Leg Pain     tripped and fell x3 days, has wound on right lower leg       HPI:  Danielle Jon is a 76 y.o. female who presents with right lower leg pain after a mechanical ground level fall 3 days ago.  Patient states that she tripped on a rock on uneven terrain and fell forward striking her face and shin. She states that the incident happened so quickly that she was unable to brace the fall. She denies any nausea or headache. She did note having some mild tinnitus of the bridge of her nose and bruising around her eyes. Fall was witnessed and she states that she did not have any loss of consciousness. She was cleaning her right shin area daily but noticed worsening pain and now redness. Patient denies HA, SOB, chest pain, palpitations, fever, chills, or n/v/d.      Past Medical History   Diagnosis Date   • Hypertension    • Thyroid disease        Past Surgical History   Procedure Laterality Date   • Cataract extraction with iol Bilateral 2014   • Mammoplasty reduction     • Eye surgery       lasix        Family History   Problem Relation Age of Onset   • Hypertension Mother    • Heart Disease Mother      chf   • Lung Disease Father    • Hypertension Sister    • Hyperlipidemia Sister    • Hypertension Brother        Social History     Social History   • Marital Status:      Spouse Name: N/A   • Number of Children: N/A   • Years of Education: N/A     Occupational History   • Not on file.     Social History Main Topics   • Smoking status: Never Smoker    • Smokeless tobacco: Never Used   • Alcohol Use: 0.0 oz/week     0 Standard drinks or equivalent per week      Comment: occasionally    • Drug Use: No   • Sexual Activity:     Partners: Female     Other Topics Concern   • Not on file     Social History Narrative         Current outpatient prescriptions:   •   levothyroxine, 50 mcg, Oral, AM ES  •  indomethacin, 50 mg, Oral, TID  •  triamterene/hctz, 1 Cap, Oral, QAM  •  losartan, 100 mg, Oral, DAILY  •  atenolol, 50 mg, Oral, BID  •  MethylPREDNISolone, Take as directed, not taking  •  simvastatin, 10 mg, Oral, Q EVENING  •  aspirin, 81 mg, Oral, DAILY  •  ibuprofen, 200 mg, Oral, Q6HRS PRN, PRN  •  Vitamin D3, 2,000 Units, Oral, DAILY    Allergies   Allergen Reactions   • Niacin Rash and Swelling     Rash/itching/swelling            ROS       Objective:     There were no vitals taken for this visit.     Physical Exam   Musculoskeletal:        Legs:         Nursing note and vital signs reviewed.    Constitutional:  Appropriately groomed, pleasant affect, well nourished, and in no acute distress.    HEENT:  Head: Atraumatic, normocephalic.    Eyes:  EOMs full.  Conjunctivae clear, sclera white, and medial canthus without exudate bilaterally. No tenderness along the zygomatic arches bilaterally. Ecchymosis present involving the inferior aspect of the left eye. Point tenderness over the bridge of the nose with small abrasion to the right eyebrow with no evidence of infectious time.     Ears:  Hearing grossly intact to voice.    Neck:  FROM.  No anterior cervical chain lymphadenopathy. Thyroid nonpalpable, without masses or nodules. No supraclavicular lymphadenopathy to palpation.    Lungs:  Lungs with normal respiratory excursion and effort.      Muscle skeletal:  Gait and station wnl, non antalgic.    Derm:  Abrasion to right shin just inferior to knee with sloughing material present and some purulence. Lesion is approximately 4 cm x 3 cm with a dark eschar centrally that is not fluctuant. Surrounding erythema and edema.    Overall good turgor pressure.     Psychiatric:  Normal judgement, mood and affect.        Assessment/Plan:     1. Fall, initial encounter    - sulfamethoxazole-trimethoprim (BACTRIM DS) 800-160 MG tablet; Take 1 Tab by mouth 2 times a day for 7 days.   Dispense: 14 Tab; Refill: 0    2. Wound infection  Patient presents with infected abrasion to the right lower extremity. Did clean with chlorhexidine and dressed with Xeroform and gauze. Recommended patient soften the eschar is present in order to remove. Concerned that wound may ulcerate beneath this and cause an anaerobic environment. Did advise redressing tonight and then leaving open the air. Patient returned to 3 days for wound recheck. Prescribed Bactrim this time.    Patient was in agreement with this treatment plan and seemed to understand without barriers. All questions were encouraged and answered.  Reviewed signs and symptoms of when to seek emergency medical care.     Please note that this dictation was created using voice recognition software.  I have made every reasonable attempt to correct obvious errors, but I expect there are errors of mikala and possibly content that I did not discover before finalizing the note.   - sulfamethoxazole-trimethoprim (BACTRIM DS) 800-160 MG tablet; Take 1 Tab by mouth 2 times a day for 7 days.  Dispense: 14 Tab; Refill: 0

## 2017-07-03 NOTE — PATIENT INSTRUCTIONS
Elevate and check wound tonight.  Re-wrap if you are going to be outside.  Return in 2-3 days for wound recheck.    Cellulitis  Cellulitis is an infection of the skin and the tissue beneath it. The infected area is usually red and tender. Cellulitis occurs most often in the arms and lower legs.   CAUSES   Cellulitis is caused by bacteria that enter the skin through cracks or cuts in the skin. The most common types of bacteria that cause cellulitis are staphylococci and streptococci.  SIGNS AND SYMPTOMS   · Redness and warmth.  · Swelling.  · Tenderness or pain.  · Fever.  DIAGNOSIS   Your health care provider can usually determine what is wrong based on a physical exam. Blood tests may also be done.  TREATMENT   Treatment usually involves taking an antibiotic medicine.  HOME CARE INSTRUCTIONS   · Take your antibiotic medicine as directed by your health care provider. Finish the antibiotic even if you start to feel better.  · Keep the infected arm or leg elevated to reduce swelling.  · Apply a warm cloth to the affected area up to 4 times per day to relieve pain.  · Take medicines only as directed by your health care provider.  · Keep all follow-up visits as directed by your health care provider.  SEEK MEDICAL CARE IF:   · You notice red streaks coming from the infected area.  · Your red area gets larger or turns dark in color.  · Your bone or joint underneath the infected area becomes painful after the skin has healed.  · Your infection returns in the same area or another area.  · You notice a swollen bump in the infected area.  · You develop new symptoms.  · You have a fever.  SEEK IMMEDIATE MEDICAL CARE IF:   · You feel very sleepy.  · You develop vomiting or diarrhea.  · You have a general ill feeling (malaise) with muscle aches and pains.  MAKE SURE YOU:   · Understand these instructions.  · Will watch your condition.  · Will get help right away if you are not doing well or get worse.     This information is not  intended to replace advice given to you by your health care provider. Make sure you discuss any questions you have with your health care provider.     Document Released: 09/27/2006 Document Revised: 01/08/2016 Document Reviewed: 03/04/2013  [employer] Interactive Patient Education ©2016 [employer]

## 2017-07-05 ENCOUNTER — HOSPITAL ENCOUNTER (OUTPATIENT)
Facility: MEDICAL CENTER | Age: 77
End: 2017-07-05
Attending: FAMILY MEDICINE
Payer: MEDICARE

## 2017-07-05 ENCOUNTER — OFFICE VISIT (OUTPATIENT)
Dept: URGENT CARE | Facility: PHYSICIAN GROUP | Age: 77
End: 2017-07-05
Payer: MEDICARE

## 2017-07-05 VITALS
SYSTOLIC BLOOD PRESSURE: 112 MMHG | HEART RATE: 54 BPM | WEIGHT: 127 LBS | RESPIRATION RATE: 14 BRPM | DIASTOLIC BLOOD PRESSURE: 64 MMHG | TEMPERATURE: 99.1 F | BODY MASS INDEX: 24.01 KG/M2 | OXYGEN SATURATION: 98 %

## 2017-07-05 DIAGNOSIS — L03.115 CELLULITIS OF RIGHT LOWER EXTREMITY: ICD-10-CM

## 2017-07-05 LAB
GRAM STN SPEC: NORMAL
SIGNIFICANT IND 70042: NORMAL
SITE SITE: NORMAL
SOURCE SOURCE: NORMAL

## 2017-07-05 PROCEDURE — 99214 OFFICE O/P EST MOD 30 MIN: CPT | Performed by: FAMILY MEDICINE

## 2017-07-05 PROCEDURE — 87205 SMEAR GRAM STAIN: CPT

## 2017-07-05 PROCEDURE — 87070 CULTURE OTHR SPECIMN AEROBIC: CPT

## 2017-07-05 RX ORDER — AMOXICILLIN 500 MG/1
500 CAPSULE ORAL 3 TIMES DAILY
Qty: 15 CAP | Refills: 0 | Status: SHIPPED | OUTPATIENT
Start: 2017-07-05 | End: 2017-07-10

## 2017-07-05 NOTE — MR AVS SNAPSHOT
Danielle Jon   2017 10:30 AM   Office Visit   MRN: [id number]    Department:  [facility]   Dept Phone:  [phone]    Description:  Female : 1940   Provider:  Sahil Sims M.D.           Reason for Visit     Wound Check R lower leg, inflamed      Allergies as of 2017     Allergen Noted Reactions    Niacin 2016   Rash, Swelling    Rash/itching/swelling      You were diagnosed with     Cellulitis of right lower extremity   [[id number]]         Vital Signs     Blood Pressure Pulse Temperature Respirations Weight Oxygen Saturation    112/64 mmHg 54 37.3 °C (99.1 °F) 14 57.607 kg (127 lb) 98%    Smoking Status                   Never Smoker            Basic Information     Date Of Birth Sex Race Ethnicity Preferred Language    1940 Female White Non- English      Problem List              ICD-10-CM Priority Class Noted - Resolved    Essential hypertension I10   2016 - Present    Hypothyroidism E03.9   2016 - Present    Routine health maintenance Z00.00   2016 - Present    Hypertriglyceridemia E78.1   2017 - Present    Skin lesion L98.9   2017 - Present    Gout of both feet M10.9   2017 - Present    Wrist pain, right M25.531   2017 - Present      Health Maintenance        Date Due Completion Dates    IMM INFLUENZA (1) 2017, 2013, 2012, 2009, 2007    IMM DTaP/Tdap/Td Vaccine (3 - Td) 2020, 2000            Current Immunizations     13-VALENT PCV PREVNAR 2015, 8/10/2015    Dtap Vaccine 2000    Influenza TIV (IM) 2015, 2012, 2009, 2007    Influenza Vaccine Adult HD 2013    Pneumococcal polysaccharide vaccine (PPSV-23) 2007    SHINGLES VACCINE 7/10/2007    Tdap Vaccine 2010      Below and/or attached are the medications your provider expects you to take. Review all of your home medications and newly ordered medications with your provider  and/or pharmacist. Follow medication instructions as directed by your provider and/or pharmacist. Please keep your medication list with you and share with your provider. Update the information when medications are discontinued, doses are changed, or new medications (including over-the-counter products) are added; and carry medication information at all times in the event of emergency situations     Allergies:  NIACIN - Rash,Swelling               Medications  Valid as of: July 05, 2017 - 11:11 AM    Generic Name Brand Name Tablet Size Instructions for use    Amoxicillin (Cap) AMOXIL 500 MG Take 1 Cap by mouth 3 times a day for 5 days.        Aspirin (Tab) aspirin 81 MG Take 81 mg by mouth every day.        Atenolol (Tab) TENORMIN 50 MG Take 1 Tab by mouth 2 times a day.        Cholecalciferol (Cap) Vitamin D3 1000 UNITS Take 2,000 Units by mouth every day.        Ibuprofen (Tab) MOTRIN 200 MG Take 200 mg by mouth every 6 hours as needed.        Indomethacin (Cap) INDOCIN 50 MG Take 1 Cap by mouth 3 times a day.        Levothyroxine Sodium (Tab) SYNTHROID 50 MCG Take 1 Tab by mouth Every morning on an empty stomach. Indications: Underactive Thyroid        Losartan Potassium (Tab) COZAAR 100 MG Take 1 Tab by mouth every day.        MethylPREDNISolone (Tablet Therapy Pack) MEDROL DOSEPAK 4 MG Take as directed        Simvastatin (Tab) ZOCOR 10 MG Take 1 Tab by mouth every evening.        Sulfamethoxazole-Trimethoprim (Tab) BACTRIM -160 MG Take 1 Tab by mouth 2 times a day for 7 days.        Triamterene-HCTZ (Cap) MAXZIDE-25/DYAZIDE 37.5-25 MG Take 1 Cap by mouth every morning.        .                 Medicines prescribed today were sent to:     [id number] - [place], [place] - [address]    [address] [place] [address]    Phone: [phone] Fax: [phone]    Open 24 Hours?: No      Medication refill instructions:       If your prescription bottle indicates you have medication refills  left, it is not necessary to call your provider’s office. Please contact your pharmacy and they will refill your medication.    If your prescription bottle indicates you do not have any refills left, you may request refills at any time through one of the following ways: The online 3 day Blinds system (except Urgent Care), by calling your provider’s office, or by asking your pharmacy to contact your provider’s office with a refill request. Medication refills are processed only during regular business hours and may not be available until the next business day. Your provider may request additional information or to have a follow-up visit with you prior to refilling your medication.   *Please Note: Medication refills are assigned a new Rx number when refilled electronically. Your pharmacy may indicate that no refills were authorized even though a new prescription for the same medication is available at the pharmacy. Please request the medicine by name with the pharmacy before contacting your provider for a refill.           3 day Blinds Access Code: Activation code not generated  Current 3 day Blinds Status: Active

## 2017-07-08 LAB
BACTERIA WND AEROBE CULT: NORMAL
GRAM STN SPEC: NORMAL
SIGNIFICANT IND 70042: NORMAL
SITE SITE: NORMAL
SOURCE SOURCE: NORMAL

## 2017-08-30 ENCOUNTER — PATIENT OUTREACH (OUTPATIENT)
Dept: HEALTH INFORMATION MANAGEMENT | Facility: OTHER | Age: 77
End: 2017-08-30

## 2017-08-30 NOTE — PROGRESS NOTES
Attempt #:1    WebIZ Checked & Epic Updated: yes  HealthConnect Verified: no  Verify PCP: yes    Communication Preference Obtained: yes     Review Care Team: yes    Annual Wellness Visit Scheduling  1. Scheduling Status:Scheduled        Care Gap Scheduling (Attempt to Schedule EACH Overdue Care Gap!)     Health Maintenance Due   Topic Date Due   • Annual Wellness Visit  Scheduled   • IMM INFLUENZA (1) 09/01/2017-Not Available until October         Funderbeam Activation: already active  Funderbeam [place]quito: no  Virtual Visits: no  Opt In to Text Messages: no

## 2017-08-31 NOTE — ASSESSMENT & PLAN NOTE
Chronic condition managed with current medical regimen  Stable per review   Continue with current meds  Followed by FERDINAND Adams.

## 2017-08-31 NOTE — ASSESSMENT & PLAN NOTE
"Chronic condition managed with current medical regimen  Stable per review with last flare 1 wk ago, med relieved sxs within \"hrs\"   Continue with current meds prn and diet management  Followed by FERDINAND Adams.      "

## 2017-08-31 NOTE — ASSESSMENT & PLAN NOTE
3/22/2017   Triglycerides 298  0 - 149 mg/dL Final    Continue with current meds  Followed by FERDINAND Adams.

## 2017-09-01 ENCOUNTER — OFFICE VISIT (OUTPATIENT)
Dept: MEDICAL GROUP | Facility: PHYSICIAN GROUP | Age: 77
End: 2017-09-01
Payer: MEDICARE

## 2017-09-01 VITALS
TEMPERATURE: 98.6 F | HEIGHT: 61 IN | HEART RATE: 57 BPM | BODY MASS INDEX: 24.35 KG/M2 | WEIGHT: 129 LBS | SYSTOLIC BLOOD PRESSURE: 140 MMHG | OXYGEN SATURATION: 99 % | DIASTOLIC BLOOD PRESSURE: 72 MMHG

## 2017-09-01 DIAGNOSIS — M25.531 WRIST PAIN, RIGHT: ICD-10-CM

## 2017-09-01 DIAGNOSIS — Z00.00 ROUTINE HEALTH MAINTENANCE: ICD-10-CM

## 2017-09-01 DIAGNOSIS — M10.9 GOUT OF BOTH FEET: ICD-10-CM

## 2017-09-01 DIAGNOSIS — E03.9 HYPOTHYROIDISM, UNSPECIFIED TYPE: ICD-10-CM

## 2017-09-01 DIAGNOSIS — L98.9 SKIN LESION: ICD-10-CM

## 2017-09-01 DIAGNOSIS — Z00.00 MEDICARE ANNUAL WELLNESS VISIT, INITIAL: Primary | ICD-10-CM

## 2017-09-01 DIAGNOSIS — E78.1 HYPERTRIGLYCERIDEMIA: ICD-10-CM

## 2017-09-01 DIAGNOSIS — I10 ESSENTIAL HYPERTENSION: ICD-10-CM

## 2017-09-01 PROCEDURE — G0439 PPPS, SUBSEQ VISIT: HCPCS | Performed by: NURSE PRACTITIONER

## 2017-09-01 RX ORDER — CHOLECALCIFEROL (VITAMIN D3) 125 MCG
500 CAPSULE ORAL DAILY
COMMUNITY
End: 2023-08-02

## 2017-09-01 ASSESSMENT — PATIENT HEALTH QUESTIONNAIRE - PHQ9: CLINICAL INTERPRETATION OF PHQ2 SCORE: 0

## 2017-09-01 NOTE — PATIENT INSTRUCTIONS
Continue with care through FERDINAND Adams.  Next Medicare Annual Wellness Visit is due in one year.    Continue care with specialists you are seeing for your chronic problems.    Attached is some preventative information for you to read.          Fall Prevention and Home Safety  Falls cause injuries and can affect all age groups. It is possible to prevent falls.   HOW TO PREVENT FALLS  · Wear shoes with rubber soles that do not have an opening for your toes.   · Keep the inside and outside of your house well lit.   · Use night lights throughout your home.   · Remove clutter from floors.   · Clean up floor spills.   · Remove throw rugs or fasten them to the floor with carpet tape.   · Do not place electrical cords across pathways.   · Put grab bars by your tub, shower, and toilet. Do not use towel bars as grab bars.   · Put handrails on both sides of the stairway. Fix loose handrails.   · Do not climb on stools or stepladders, if possible.   · Do not wax your floors.   · Repair uneven or unsafe sidewalks, walkways, or stairs.   · Keep items you use a lot within reach.   · Be aware of pets.   · Keep emergency numbers next to the telephone.   · Put smoke detectors in your home and near bedrooms.   Ask your doctor what other things you can do to prevent falls.  Document Released: 10/14/2010 Document Revised: 06/18/2013 Document Reviewed: 03/19/2013  ExitCare® Patient Information ©2013 [employer].    Exercise to Stay Healthy      Exercise helps you become and stay healthy.  EXERCISE IDEAS AND TIPS  Choose exercises that:  · You enjoy.   · Fit into your day.   You do not need to exercise really hard to be healthy. You can do exercises at a slow or medium level and stay healthy. You can:  · Stretch before and after working out.   · Try yoga, Pilates, or lexii chi.   · Lift weights.   · Walk fast, swim, jog, run, climb stairs, bicycle, dance, or rollerskate.   · Take aerobic classes.   Exercises that burn  about 150 calories:  · Running 1 ½ miles in 15 minutes.   · Playing volleyball for 45 to 60 minutes.   · Washing and waxing a car for 45 to 60 minutes.   · Playing touch football for 45 minutes.   · Walking 1 ¾ miles in 35 minutes.   · Pushing a stroller 1 ½ miles in 30 minutes.   · Playing basketball for 30 minutes.   · Raking leaves for 30 minutes.   · Bicycling 5 miles in 30 minutes.   · Walking 2 miles in 30 minutes.   · Dancing for 30 minutes.   · Shoveling snow for 15 minutes.   · Swimming laps for 20 minutes.   · Walking up stairs for 15 minutes.   · Bicycling 4 miles in 15 minutes.   · Gardening for 30 to 45 minutes.   · Jumping rope for 15 minutes.   · Washing windows or floors for 45 to 60 minutes.     One suggestion is to start walking for 10 minutes after each meal.  This will help with digestion and help you to metabolize your meal.       For Weight Loss -   Recommend portion sizes with more fruits/vegetables/high fiber foods.  Stay away from white bread/pastas/white rice/white potatoes.  Increase Fluid intake to 6-8 glasses of water daily.   Eliminate soda's (diet and regular) from your fluid intake.      Document Released: 01/20/2012 Document Revised: 03/11/2013 Document Reviewed: 01/20/2012  ExitCare® Patient Information ©2013 [employer].    Fat and Cholesterol Control Diet  Cholesterol is a wax-like substance. It comes from your liver and is found in certain foods. There is good (HDL) and bad (LDL) cholesterol. Too much cholesterol in your blood can affect your heart. Certain foods can lower or raise your cholesterol. Eat foods that are low in cholesterol.  Saturated and trans fats are bad fats found in foods that will raise your cholesterol. Do not eat foods that are high in saturated and trans fats.  FOODS HIGHER IN SATURATED AND TRANS FATS  · Dairy products, such as whole milk, eggs, cheese, cream, and butter.   · Fatty meats, such as hot dogs, sausage, and salami.   · Fried foods.   · Trans  fats which are found in margarine and pre-made cookies, crackers, and baked goods.   · Tropical oils, such as coconut and palm oils.   Read package labels at the store. Do not buy products that use saturated or trans fats or hydrogenated oils. Find foods labeled:  · Low-fat.   · Low-saturated fat.   · Trans-fat-free.   · Low-cholesterol.   FOODS LOWER IN CHOLESTEROL  ·  Fruit.   · Vegetables.   · Beans, peas, and lentils.   · Fish.   · Lean meat, such as chicken (without skin) or ground turkey.   · Grains, such as barley, rice, couscous, bulgur wheat, and pasta.   · Heart-healthy tub margarine.   PREPARING YOUR FOOD  · Broil, bake, steam, or roast foods. Do not douglas food.   · Use non-stick cooking sprays.   · Use lemon or herbs to flavor food instead of using butter or stick margarine.   · Use nonfat yogurt, salsa, or low-fat dressings for salads.   Document Released: 06/18/2013 Document Reviewed: 06/18/2013  ExitCare® Patient Information ©2013 [employer].    Recommend annual flu vaccine.  Next due in Fall, 2017.  If you decide not to have the flu vaccine, recommend good handwashing and staying out of crowds during flu season.

## 2017-09-01 NOTE — PROGRESS NOTES
CC:   Medicare Annual Wellness Visit    HPI:  Danielle is a 76 y.o. here for Medicare Annual Wellness Visit    Patient Active Problem List    Diagnosis Date Noted   • Gout of both feet 06/06/2017   • Hypertriglyceridemia 04/04/2017   • Routine health maintenance 02/21/2016   • Essential hypertension 02/19/2016   • Hypothyroidism 02/19/2016     Current Outpatient Prescriptions   Medication Sig Dispense Refill   • cyanocobalamin (VITAMIN B-12) 500 MCG Tab Take 500 mcg by mouth every day.     • levothyroxine (SYNTHROID) 50 MCG Tab Take 1 Tab by mouth Every morning on an empty stomach. Indications: Underactive Thyroid 90 Tab 2   • indomethacin (INDOCIN) 50 MG Cap Take 1 Cap by mouth 3 times a day. 90 Cap 2   • triamterene/hctz (MAXZIDE-25/DYAZIDE) 37.5-25 MG Cap Take 1 Cap by mouth every morning. 90 Cap 0   • losartan (COZAAR) 100 MG Tab Take 1 Tab by mouth every day. 90 Tab 0   • atenolol (TENORMIN) 50 MG Tab Take 1 Tab by mouth 2 times a day. 180 Tab 0   • MethylPREDNISolone (MEDROL DOSEPAK) 4 MG Tablet Therapy Pack Take as directed 21 Tab 0   • simvastatin (ZOCOR) 10 MG Tab Take 1 Tab by mouth every evening. 30 Tab 11   • aspirin 81 MG tablet Take 81 mg by mouth every day.     • ibuprofen (MOTRIN) 200 MG Tab Take 200 mg by mouth every 6 hours as needed.     • Cholecalciferol (VITAMIN D3) 1000 UNITS Cap Take 2,000 Units by mouth every day.       No current facility-administered medications for this visit.       Current supplements: no  Chronic narcotic pain medicines: no  Allergies: Niacin  Exercise: yes  Current social contact/activities: Has support of family and friends      Screening:  Depression Screening    Little interest or pleasure in doing things?  0 - not at all  Feeling down, depressed , or hopeless? 0 - not at all  Patient Health Questionnaire Score: 0     If depressive symptoms identified deferred to follow up visit unless specifically addressed in assessment and plan.    Interpretation of PHQ-9 Total  Score   Score Severity   1-4 No Depression   5-9 Mild Depression   10-14 Moderate Depression   15-19 Moderately Severe Depression   20-27 Severe Depression    Screening for Cognitive Impairment    Three Minute Recall (apple, watch, vandana)  2/3    Draw clock face with all 12 numbers set to the hand to show 10 minutes past 11 o'clock  1 5  Cognitive concerns identified deferred for follow up unless specifically addressed in assessment and plan.    Fall Risk Assessment    Has the patient had two or more falls in the last year or any fall with injury in the last year?  No    Safety Assessment    Throw rugs on floor.  Yes  Handrails on all stairs.  Yes  Good lighting in all hallways.  Yes  Difficulty hearing.  No  Patient counseled about all safety risks that were identified.    Functional Assessment ADLs    Are there any barriers preventing you from cooking for yourself or meeting nutritional needs?  No.    Are there any barriers preventing you from driving safely or obtaining transportation?  No.    Are there any barriers preventing you from using a telephone or calling for help?  No.    Are there any barriers preventing you from shopping?  No.    Are there any barriers preventing you from taking care of your own finances?  No.    Are there any barriers preventing you from managing your medications?  No.    Are currently engaging any exercise or physical activity?  Yes.       Health Maintenance Summary                Annual Wellness Visit Overdue 1940     IMM INFLUENZA Overdue 9/1/2017      Done 12/1/2015 Imm Admin: Influenza TIV (IM)     Patient has more history with this topic...    IMM DTaP/Tdap/Td Vaccine Next Due 7/22/2020      Done 7/22/2010 Imm Admin: Tdap Vaccine     Patient has more history with this topic...          Patient Care Team:  FERDINAND Adams as PCP - General (Family Medicine)        Social History   Substance Use Topics   • Smoking status: Never Smoker   • Smokeless tobacco:  "Never Used   • Alcohol use 8.4 oz/week     14 Glasses of wine per week       Family History   Problem Relation Age of Onset   • Hypertension Mother    • Heart Disease Mother      chf   • Lung Disease Father    • Hypertension Sister    • Hyperlipidemia Sister    • Hypertension Brother      She  has a past medical history of Hypertension and Thyroid disease. She also has no past medical history of Encounter for long-term (current) use of other medications.   Past Surgical History:   Procedure Laterality Date   • CATARACT EXTRACTION WITH IOL Bilateral 2014   • EYE SURGERY      lasix    • MAMMOPLASTY REDUCTION         ROS:    Ostomy or other tubes or amputations: no  Chronic oxygen use no  Last eye exam 2016  : Denies incontinence.   Gait: Uses no assistive device   Hearing excellent.    Dentition good    Exam: Blood pressure 140/72, pulse (!) 57, temperature 37 °C (98.6 °F), height 1.549 m (5' 1\"), weight 58.5 kg (129 lb), SpO2 99 %. Body mass index is 24.37 kg/m².  Alert, oriented in no acute distress.  Eye contact is good, speech goal directed, affect calm      Assessment and Plan. The following treatment and monitoring plan is recommended for all problems as listed below:   Essential hypertension  Chronic condition managed with current medical regimen  Stable per review   Continue with current meds  Followed by FERDINAND Adams.         Hypothyroidism  Chronic condition managed with current medical regimen  Stable per review   Continue with current meds  Followed by FERDINAND Adams.        Routine health maintenance  Managed by FERDINAND Adams     Hypertriglyceridemia  3/22/2017   Triglycerides 298  0 - 149 mg/dL Final    Continue with current meds  Followed by FERDINAND Adams.       Gout of both feet  Chronic condition managed with current medical regimen  Stable per review with last flare 1 wk ago, med relieved sxs within \"hrs\"   Continue with current meds " prn and diet management  Followed by FERDINAND Adams.        Skin lesion  resolved    Wrist pain, right  Per pt resolved      Health Care Screening recommendations reviewed with patient today: per Patient Instructions  DPA/Advanced directive: .states currently working on an advanced directive    Next office visit for recheck of chronic medical conditions is due with FERDINAND Adams in 6 months

## 2017-09-05 ENCOUNTER — TELEPHONE (OUTPATIENT)
Dept: MEDICAL GROUP | Facility: PHYSICIAN GROUP | Age: 77
End: 2017-09-05

## 2017-09-05 DIAGNOSIS — I10 ESSENTIAL HYPERTENSION: ICD-10-CM

## 2017-09-05 RX ORDER — TRIAMTERENE AND HYDROCHLOROTHIAZIDE 37.5; 25 MG/1; MG/1
1 CAPSULE ORAL EVERY MORNING
Qty: 90 CAP | Refills: 1 | Status: SHIPPED | OUTPATIENT
Start: 2017-09-05 | End: 2018-01-19

## 2017-09-05 RX ORDER — ATENOLOL 50 MG/1
50 TABLET ORAL 2 TIMES DAILY
Qty: 180 TAB | Refills: 1 | Status: SHIPPED | OUTPATIENT
Start: 2017-09-05 | End: 2017-09-05 | Stop reason: CLARIF

## 2017-09-05 RX ORDER — METOPROLOL SUCCINATE 50 MG/1
50 TABLET, EXTENDED RELEASE ORAL DAILY
Qty: 90 TAB | Refills: 3 | Status: SHIPPED | OUTPATIENT
Start: 2017-09-05 | End: 2018-09-04 | Stop reason: SDUPTHER

## 2017-09-05 RX ORDER — LOSARTAN POTASSIUM 100 MG/1
100 TABLET ORAL DAILY
Qty: 90 TAB | Refills: 1 | Status: SHIPPED | OUTPATIENT
Start: 2017-09-05 | End: 2018-03-06 | Stop reason: SDUPTHER

## 2017-09-05 NOTE — TELEPHONE ENCOUNTER
Refill X 6 months, sent to pharmacy.Pt. Seen in the last 6 months per protocol.   Lab Results   Component Value Date/Time    SODIUM 137 03/22/2017 09:09 AM    POTASSIUM 3.9 03/22/2017 09:09 AM    CHLORIDE 103 03/22/2017 09:09 AM    CO2 28 03/22/2017 09:09 AM    GLUCOSE 100 (H) 03/22/2017 09:09 AM    BUN 21 03/22/2017 09:09 AM    CREATININE 1.42 (H) 03/22/2017 09:09 AM     Maurice Covarrubias, HarshD

## 2017-09-05 NOTE — TELEPHONE ENCOUNTER
Was the patient seen in the last year in this department? Yes     Does patient have an active prescription for medications requested? No     Received Request Via: Pharmacy      Pt met protocol?: Yes, OV 9/1/17   BP Readings from Last 1 Encounters:   09/01/17 140/72

## 2017-09-05 NOTE — TELEPHONE ENCOUNTER
1. Caller Name: Adan                                         Call Back Number: [phone]      Patient approves a detailed voicemail message: no    Pharmacy asking for a substitution for atenolol which is on a national backorder.

## 2017-10-11 ENCOUNTER — HOSPITAL ENCOUNTER (OUTPATIENT)
Dept: LAB | Facility: MEDICAL CENTER | Age: 77
End: 2017-10-11
Attending: NURSE PRACTITIONER
Payer: MEDICARE

## 2017-10-11 ENCOUNTER — NON-PROVIDER VISIT (OUTPATIENT)
Dept: MEDICAL GROUP | Facility: PHYSICIAN GROUP | Age: 77
End: 2017-10-11
Payer: MEDICARE

## 2017-10-11 DIAGNOSIS — E78.1 HYPERTRIGLYCERIDEMIA: Chronic | ICD-10-CM

## 2017-10-11 DIAGNOSIS — E03.9 HYPOTHYROIDISM, UNSPECIFIED TYPE: ICD-10-CM

## 2017-10-11 DIAGNOSIS — Z23 NEED FOR INFLUENZA VACCINATION: ICD-10-CM

## 2017-10-11 DIAGNOSIS — N28.9 FUNCTION KIDNEY DECREASED: ICD-10-CM

## 2017-10-11 LAB
ALBUMIN SERPL BCP-MCNC: 4.4 G/DL (ref 3.2–4.9)
ALBUMIN/GLOB SERPL: 1.5 G/DL
ALP SERPL-CCNC: 39 U/L (ref 30–99)
ALT SERPL-CCNC: 17 U/L (ref 2–50)
ANION GAP SERPL CALC-SCNC: 9 MMOL/L (ref 0–11.9)
AST SERPL-CCNC: 21 U/L (ref 12–45)
BILIRUB SERPL-MCNC: 0.6 MG/DL (ref 0.1–1.5)
BUN SERPL-MCNC: 36 MG/DL (ref 8–22)
CALCIUM SERPL-MCNC: 10.5 MG/DL (ref 8.5–10.5)
CHLORIDE SERPL-SCNC: 108 MMOL/L (ref 96–112)
CHOLEST SERPL-MCNC: 171 MG/DL (ref 100–199)
CO2 SERPL-SCNC: 24 MMOL/L (ref 20–33)
CREAT SERPL-MCNC: 1.86 MG/DL (ref 0.5–1.4)
GFR SERPL CREATININE-BSD FRML MDRD: 26 ML/MIN/1.73 M 2
GLOBULIN SER CALC-MCNC: 3 G/DL (ref 1.9–3.5)
GLUCOSE SERPL-MCNC: 95 MG/DL (ref 65–99)
HDLC SERPL-MCNC: 46 MG/DL
LDLC SERPL CALC-MCNC: 88 MG/DL
POTASSIUM SERPL-SCNC: 4.4 MMOL/L (ref 3.6–5.5)
PROT SERPL-MCNC: 7.4 G/DL (ref 6–8.2)
SODIUM SERPL-SCNC: 141 MMOL/L (ref 135–145)
TRIGL SERPL-MCNC: 187 MG/DL (ref 0–149)
TSH SERPL DL<=0.005 MIU/L-ACNC: 2.76 UIU/ML (ref 0.3–3.7)

## 2017-10-11 PROCEDURE — 84443 ASSAY THYROID STIM HORMONE: CPT

## 2017-10-11 PROCEDURE — 36415 COLL VENOUS BLD VENIPUNCTURE: CPT

## 2017-10-11 PROCEDURE — 80061 LIPID PANEL: CPT

## 2017-10-11 PROCEDURE — 90662 IIV NO PRSV INCREASED AG IM: CPT | Performed by: FAMILY MEDICINE

## 2017-10-11 PROCEDURE — G0008 ADMIN INFLUENZA VIRUS VAC: HCPCS | Performed by: FAMILY MEDICINE

## 2017-10-11 PROCEDURE — 80053 COMPREHEN METABOLIC PANEL: CPT

## 2017-10-11 NOTE — PROGRESS NOTES
"Danielle Jon is a 77 y.o. female here for a non-provider visit for:   FLU    Reason for immunization: Annual Flu Vaccine  Immunization records indicate need for vaccine: Yes, confirmed with MARIYA Page  Minimum interval has been met for this vaccine: Yes  ABN completed: Yes    Order and dose verified by: MORENO DE LA CRUZ Dated  8/2015 was given to patient: Yes  All IAC Questionnaire questions were answered \"No.\"    Patient tolerated injection and no adverse effects were observed or reported: Yes    Pt scheduled for next dose in series: No    "

## 2017-10-12 ENCOUNTER — TELEPHONE (OUTPATIENT)
Dept: MEDICAL GROUP | Facility: PHYSICIAN GROUP | Age: 77
End: 2017-10-12

## 2017-10-12 NOTE — TELEPHONE ENCOUNTER
----- Message from FERDINAND Adams sent at 10/11/2017 10:38 PM PDT -----  Please call patient to ensure she sees results on MyChart. Lab results were released to patient's My Chart.

## 2017-10-18 ENCOUNTER — OFFICE VISIT (OUTPATIENT)
Dept: MEDICAL GROUP | Facility: PHYSICIAN GROUP | Age: 77
End: 2017-10-18
Payer: MEDICARE

## 2017-10-18 VITALS
BODY MASS INDEX: 24.35 KG/M2 | HEART RATE: 65 BPM | SYSTOLIC BLOOD PRESSURE: 112 MMHG | WEIGHT: 129 LBS | HEIGHT: 61 IN | RESPIRATION RATE: 14 BRPM | TEMPERATURE: 97.7 F | DIASTOLIC BLOOD PRESSURE: 58 MMHG | OXYGEN SATURATION: 98 %

## 2017-10-18 DIAGNOSIS — E78.1 HYPERTRIGLYCERIDEMIA: Chronic | ICD-10-CM

## 2017-10-18 DIAGNOSIS — I10 ESSENTIAL HYPERTENSION: Chronic | ICD-10-CM

## 2017-10-18 DIAGNOSIS — E03.9 HYPOTHYROIDISM, UNSPECIFIED TYPE: ICD-10-CM

## 2017-10-18 DIAGNOSIS — R63.0 DECREASED APPETITE: ICD-10-CM

## 2017-10-18 DIAGNOSIS — N28.9 FUNCTION KIDNEY DECREASED: ICD-10-CM

## 2017-10-18 PROCEDURE — 99213 OFFICE O/P EST LOW 20 MIN: CPT | Performed by: NURSE PRACTITIONER

## 2017-10-18 NOTE — ASSESSMENT & PLAN NOTE
Patients here to discuss lab results. She previously received my chart message to follow up with nephrology for decreased kidney function. She has increased BUN and creatinine and her GFR is only 26. She denies any flank pain, abdominal pain or difficulty urinating. She does admit that she has difficulty drinking enough water.  Lab Results   Component Value Date/Time    SODIUM 141 10/11/2017 11:22 AM    POTASSIUM 4.4 10/11/2017 11:22 AM    CHLORIDE 108 10/11/2017 11:22 AM    CO2 24 10/11/2017 11:22 AM    GLUCOSE 95 10/11/2017 11:22 AM    BUN 36 (H) 10/11/2017 11:22 AM    CREATININE 1.86 (H) 10/11/2017 11:22 AM

## 2017-10-18 NOTE — PROGRESS NOTES
Subjective:     Chief Complaint   Patient presents with   • Results     Labs        HPI:  Danielle Jon is a 77 y.o. female here today to discuss the following:    Function kidney decreased  Patients here to discuss lab results. She previously received my chart message to follow up with nephrology for decreased kidney function. She has increased BUN and creatinine and her GFR is only 26. She denies any flank pain, abdominal pain or difficulty urinating. She does admit that she has difficulty drinking enough water.  Lab Results   Component Value Date/Time    SODIUM 141 10/11/2017 11:22 AM    POTASSIUM 4.4 10/11/2017 11:22 AM    CHLORIDE 108 10/11/2017 11:22 AM    CO2 24 10/11/2017 11:22 AM    GLUCOSE 95 10/11/2017 11:22 AM    BUN 36 (H) 10/11/2017 11:22 AM    CREATININE 1.86 (H) 10/11/2017 11:22 AM        Essential hypertension  Chronic condition: Patient is treated for hypertension with losartan 100 mg and triamterene/hctz and metoprolol and blood pressure is well controlled..  She denies any chest pain, diaphoresis, shortness of breath, headaches, dizziness or blurred vision.     Hypertriglyceridemia  Chronic Condition: Patient has hyperlipidemia and is currently taking simvastatin without any side effects. She denies any chest pain, diaphoresis, shortness of breath, headaches, dizziness, blurred vision or myalgias.   Lab Results   Component Value Date/Time    CHOLSTRLTOT 171 10/11/2017 11:22 AM    LDL 88 10/11/2017 11:22 AM    HDL 46 10/11/2017 11:22 AM    TRIGLYCERIDE 187 (H) 10/11/2017 11:22 AM       Lab Results   Component Value Date/Time    SODIUM 141 10/11/2017 11:22 AM    POTASSIUM 4.4 10/11/2017 11:22 AM    CHLORIDE 108 10/11/2017 11:22 AM    CO2 24 10/11/2017 11:22 AM    GLUCOSE 95 10/11/2017 11:22 AM    BUN 36 (H) 10/11/2017 11:22 AM    CREATININE 1.86 (H) 10/11/2017 11:22 AM     Lab Results   Component Value Date/Time    ALKPHOSPHAT 39 10/11/2017 11:22 AM    ASTSGOT 21 10/11/2017 11:22 AM    ALTSGPT 17  10/11/2017 11:22 AM    TBILIRUBIN 0.6 10/11/2017 11:22 AM            Hypothyroidism  Chronic condition: Patient is treated for hypothyroidism with levothyroxine 50mcg. She  denies heart palpitations, fatigue, weight gain, cold intolerance, depression, dry skin, hair loss, constipation, weakness, headache, lethargy or panic . Last TSH 2.76 was done October 1, 207.          Current medicines (including changes today)  Current Outpatient Prescriptions   Medication Sig Dispense Refill   • losartan (COZAAR) 100 MG Tab Take 1 Tab by mouth every day. 90 Tab 1   • triamterene/hctz (MAXZIDE-25/DYAZIDE) 37.5-25 MG Cap Take 1 Cap by mouth every morning. 90 Cap 1   • metoprolol SR (TOPROL XL) 50 MG TABLET SR 24 HR Take 1 Tab by mouth every day. 90 Tab 3   • cyanocobalamin (VITAMIN B-12) 500 MCG Tab Take 500 mcg by mouth every day.     • levothyroxine (SYNTHROID) 50 MCG Tab Take 1 Tab by mouth Every morning on an empty stomach. Indications: Underactive Thyroid 90 Tab 2   • indomethacin (INDOCIN) 50 MG Cap Take 1 Cap by mouth 3 times a day. 90 Cap 2   • MethylPREDNISolone (MEDROL DOSEPAK) 4 MG Tablet Therapy Pack Take as directed 21 Tab 0   • simvastatin (ZOCOR) 10 MG Tab Take 1 Tab by mouth every evening. 30 Tab 11   • aspirin 81 MG tablet Take 81 mg by mouth every day.     • ibuprofen (MOTRIN) 200 MG Tab Take 200 mg by mouth every 6 hours as needed.     • Cholecalciferol (VITAMIN D3) 1000 UNITS Cap Take 2,000 Units by mouth every day.       No current facility-administered medications for this visit.        She  has a past medical history of Hypertension and Thyroid disease. She also has no past medical history of Encounter for long-term (current) use of other medications.    ROS   Review of Systems   Constitutional: Negative for fever, chills, weight loss and malaise/fatigue.   HENT: Negative for ear pain, nosebleeds, congestion, sore throat and neck pain.    Respiratory: Negative for cough, sputum production, shortness of  "breath and wheezing.    Cardiovascular: Negative for chest pain, palpitations,  and leg swelling.   Gastrointestinal: Negative for heartburn, nausea, vomiting, diarrhea and abdominal pain.   Neurological: Negative for dizziness, tingling, tremors, sensory change, focal weakness and headaches.   Psychiatric/Behavioral: Negative for depression, anxiety, suicidal ideas, insomnia and memory loss.    All other systems reviewed and are negative except as in HPI.     Objective:   Physical Exam:  Blood pressure 112/58, pulse 65, temperature 36.5 °C (97.7 °F), resp. rate 14, height 1.549 m (5' 1\"), weight 58.5 kg (129 lb), SpO2 98 %. Body mass index is 24.37 kg/m².   Physical Exam:  Alert, oriented in no acute distress.  Eye contact is good, speech goal directed, affect calm  HEENT: conjunctiva non-injected, sclera non-icteric.  Pinna normal.   Neck No adenopathy or masses in the neck or supraclavicular regions.  Lungs: clear to auscultation bilaterally with good excursion.  CV: regular rate and rhythm.   Abdomen: soft, nontender, No CVAT. Normal bowel sounds.  Ext: no edema, color normal, vascularity normal, temperature normal  MS: Normal gait and station    Assessment and Plan:   The following treatment plan was discussed   1. Decreased appetite     2. Function kidney decreased      Follow up with nephrology   3. Essential hypertension      Continue current medication   4. Hypertriglyceridemia      Continue current medication   5. Hypothyroidism, unspecified type      Continue current medication     I have reviewed all recent labs with the patient and answered all questions. Patient encouraged to follow up with nephrology.  Followup: Return in about 3 months (around 1/18/2018) for HTN/Lipid, thyroid, decreased kidney function.   Please note that this dictation was created using voice recognition software. I have made every reasonable attempt to correct obvious errors, but I expect that there are errors of grammar and " possibly content that I did not discover before finalizing the note.

## 2017-10-24 NOTE — ASSESSMENT & PLAN NOTE
Chronic condition: Patient is treated for hypertension with losartan 100 mg and triamterene/hctz and metoprolol and blood pressure is well controlled..  She denies any chest pain, diaphoresis, shortness of breath, headaches, dizziness or blurred vision.

## 2017-10-24 NOTE — ASSESSMENT & PLAN NOTE
Chronic condition: Patient is treated for hypothyroidism with levothyroxine 50mcg. She  denies heart palpitations, fatigue, weight gain, cold intolerance, depression, dry skin, hair loss, constipation, weakness, headache, lethargy or panic . Last TSH 2.76 was done October 1, 207.

## 2017-10-24 NOTE — ASSESSMENT & PLAN NOTE
Chronic Condition: Patient has hyperlipidemia and is currently taking simvastatin without any side effects. She denies any chest pain, diaphoresis, shortness of breath, headaches, dizziness, blurred vision or myalgias.   Lab Results   Component Value Date/Time    CHOLSTRLTOT 171 10/11/2017 11:22 AM    LDL 88 10/11/2017 11:22 AM    HDL 46 10/11/2017 11:22 AM    TRIGLYCERIDE 187 (H) 10/11/2017 11:22 AM       Lab Results   Component Value Date/Time    SODIUM 141 10/11/2017 11:22 AM    POTASSIUM 4.4 10/11/2017 11:22 AM    CHLORIDE 108 10/11/2017 11:22 AM    CO2 24 10/11/2017 11:22 AM    GLUCOSE 95 10/11/2017 11:22 AM    BUN 36 (H) 10/11/2017 11:22 AM    CREATININE 1.86 (H) 10/11/2017 11:22 AM     Lab Results   Component Value Date/Time    ALKPHOSPHAT 39 10/11/2017 11:22 AM    ASTSGOT 21 10/11/2017 11:22 AM    ALTSGPT 17 10/11/2017 11:22 AM    TBILIRUBIN 0.6 10/11/2017 11:22 AM

## 2017-11-29 ENCOUNTER — HOSPITAL ENCOUNTER (OUTPATIENT)
Dept: LAB | Facility: MEDICAL CENTER | Age: 77
End: 2017-11-29
Attending: INTERNAL MEDICINE
Payer: MEDICARE

## 2017-11-29 ENCOUNTER — OFFICE VISIT (OUTPATIENT)
Dept: NEPHROLOGY | Facility: MEDICAL CENTER | Age: 77
End: 2017-11-29
Payer: MEDICARE

## 2017-11-29 VITALS
SYSTOLIC BLOOD PRESSURE: 132 MMHG | WEIGHT: 130 LBS | RESPIRATION RATE: 16 BRPM | HEART RATE: 54 BPM | TEMPERATURE: 97.8 F | DIASTOLIC BLOOD PRESSURE: 80 MMHG | OXYGEN SATURATION: 99 % | HEIGHT: 61 IN | BODY MASS INDEX: 24.55 KG/M2

## 2017-11-29 DIAGNOSIS — N17.9 AKI (ACUTE KIDNEY INJURY) (HCC): ICD-10-CM

## 2017-11-29 DIAGNOSIS — N18.30 CKD (CHRONIC KIDNEY DISEASE), STAGE III (HCC): ICD-10-CM

## 2017-11-29 DIAGNOSIS — M10.9 GOUT OF BOTH FEET: ICD-10-CM

## 2017-11-29 DIAGNOSIS — I10 ESSENTIAL HYPERTENSION: ICD-10-CM

## 2017-11-29 DIAGNOSIS — D64.9 ANEMIA, UNSPECIFIED TYPE: ICD-10-CM

## 2017-11-29 DIAGNOSIS — E55.9 VITAMIN D DEFICIENCY: ICD-10-CM

## 2017-11-29 LAB
25(OH)D3 SERPL-MCNC: 38 NG/ML (ref 30–100)
ANION GAP SERPL CALC-SCNC: 9 MMOL/L (ref 0–11.9)
APPEARANCE UR: CLEAR
BACTERIA #/AREA URNS HPF: NEGATIVE /HPF
BILIRUB UR QL STRIP.AUTO: NEGATIVE
BUN SERPL-MCNC: 28 MG/DL (ref 8–22)
CALCIUM SERPL-MCNC: 10.9 MG/DL (ref 8.5–10.5)
CHLORIDE SERPL-SCNC: 110 MMOL/L (ref 96–112)
CO2 SERPL-SCNC: 23 MMOL/L (ref 20–33)
COLOR UR: YELLOW
CREAT SERPL-MCNC: 1.23 MG/DL (ref 0.5–1.4)
EPI CELLS #/AREA URNS HPF: NEGATIVE /HPF
ERYTHROCYTE [DISTWIDTH] IN BLOOD BY AUTOMATED COUNT: 43.9 FL (ref 35.9–50)
GFR SERPL CREATININE-BSD FRML MDRD: 42 ML/MIN/1.73 M 2
GLUCOSE SERPL-MCNC: 88 MG/DL (ref 65–99)
GLUCOSE UR STRIP.AUTO-MCNC: NEGATIVE MG/DL
HCT VFR BLD AUTO: 36.8 % (ref 37–47)
HGB BLD-MCNC: 12.5 G/DL (ref 12–16)
HYALINE CASTS #/AREA URNS LPF: ABNORMAL /LPF
KETONES UR STRIP.AUTO-MCNC: NEGATIVE MG/DL
LEUKOCYTE ESTERASE UR QL STRIP.AUTO: ABNORMAL
MCH RBC QN AUTO: 33.2 PG (ref 27–33)
MCHC RBC AUTO-ENTMCNC: 34 G/DL (ref 33.6–35)
MCV RBC AUTO: 97.6 FL (ref 81.4–97.8)
MICRO URNS: ABNORMAL
NITRITE UR QL STRIP.AUTO: NEGATIVE
PH UR STRIP.AUTO: 6.5 [PH]
PLATELET # BLD AUTO: 247 K/UL (ref 164–446)
PMV BLD AUTO: 10.9 FL (ref 9–12.9)
POTASSIUM SERPL-SCNC: 4.9 MMOL/L (ref 3.6–5.5)
PROT UR QL STRIP: NEGATIVE MG/DL
PTH-INTACT SERPL-MCNC: 101.2 PG/ML (ref 14–72)
RBC # BLD AUTO: 3.77 M/UL (ref 4.2–5.4)
RBC # URNS HPF: ABNORMAL /HPF
RBC UR QL AUTO: NEGATIVE
SODIUM SERPL-SCNC: 142 MMOL/L (ref 135–145)
SP GR UR STRIP.AUTO: 1.01
URATE SERPL-MCNC: 7.9 MG/DL (ref 1.9–8.2)
UROBILINOGEN UR STRIP.AUTO-MCNC: 0.2 MG/DL
WBC # BLD AUTO: 6.3 K/UL (ref 4.8–10.8)
WBC #/AREA URNS HPF: ABNORMAL /HPF

## 2017-11-29 PROCEDURE — 99204 OFFICE O/P NEW MOD 45 MIN: CPT | Performed by: INTERNAL MEDICINE

## 2017-11-29 PROCEDURE — 81001 URINALYSIS AUTO W/SCOPE: CPT

## 2017-11-29 PROCEDURE — 83970 ASSAY OF PARATHORMONE: CPT

## 2017-11-29 PROCEDURE — 84550 ASSAY OF BLOOD/URIC ACID: CPT

## 2017-11-29 PROCEDURE — 80048 BASIC METABOLIC PNL TOTAL CA: CPT

## 2017-11-29 PROCEDURE — 85027 COMPLETE CBC AUTOMATED: CPT

## 2017-11-29 PROCEDURE — 36415 COLL VENOUS BLD VENIPUNCTURE: CPT

## 2017-11-29 PROCEDURE — 82306 VITAMIN D 25 HYDROXY: CPT

## 2017-11-29 ASSESSMENT — ENCOUNTER SYMPTOMS
NAUSEA: 0
CARDIOVASCULAR NEGATIVE: 1
SHORTNESS OF BREATH: 0
HEMOPTYSIS: 0
ORTHOPNEA: 0
ABDOMINAL PAIN: 0
WEIGHT LOSS: 0
PALPITATIONS: 0
SORE THROAT: 0
BACK PAIN: 1
CHILLS: 0
MYALGIAS: 0
RESPIRATORY NEGATIVE: 1
CONSTITUTIONAL NEGATIVE: 1
FEVER: 0
FLANK PAIN: 0
VOMITING: 0
COUGH: 0
DIARRHEA: 0
WHEEZING: 0
EYES NEGATIVE: 1

## 2017-11-29 NOTE — PROGRESS NOTES
"Subjective:      Danielle Jon is a 77 y.o. female who presents with Establish Care (NP/ DX function kidney decreased ) and Chronic Kidney Disease            HPI  Danielle is coming today for initial evaluation of CKD III with worsening creatinine level  Baseline creatinine level at 1.2's worse to 1.4 in March 2017  - recent one up to 1.86  Doing well, has no complaints, no difficulties to urinated  No dysuria/hematuria  Has hx/of gout attacks treated with prednisone/indocin  Also back pain treated with ibuprofen in the past stopped  HTN: BP well controlled, compliant to medications and low Na diet    Review of Systems   Constitutional: Negative.  Negative for chills, fever, malaise/fatigue and weight loss.   HENT: Negative.  Negative for congestion, nosebleeds and sore throat.    Eyes: Negative.    Respiratory: Negative.  Negative for cough, hemoptysis, shortness of breath and wheezing.    Cardiovascular: Negative.  Negative for chest pain, palpitations, orthopnea and leg swelling.   Gastrointestinal: Negative for abdominal pain, diarrhea, nausea and vomiting.   Genitourinary: Negative.  Negative for dysuria, flank pain, frequency, hematuria and urgency.   Musculoskeletal: Positive for back pain. Negative for myalgias.   Skin: Negative.  Negative for itching and rash.   All other systems reviewed and are negative.         Objective:     /80   Pulse (!) 54   Temp 36.6 °C (97.8 °F)   Resp 16   Ht 1.549 m (5' 1\")   Wt 59 kg (130 lb)   SpO2 99%   BMI 24.56 kg/m²      Physical Exam   Constitutional: She is oriented to person, place, and time. She appears well-developed and well-nourished.   HENT:   Head: Normocephalic and atraumatic.   Nose: Nose normal.   Mouth/Throat: Oropharynx is clear and moist.   Eyes: Conjunctivae and EOM are normal. Pupils are equal, round, and reactive to light.   Neck: Normal range of motion. Neck supple.   Cardiovascular: Normal rate and regular rhythm.  Exam reveals no gallop " and no friction rub.    No murmur heard.  Pulmonary/Chest: Effort normal and breath sounds normal. No respiratory distress. She has no wheezes. She has no rales.   Abdominal: Soft. Bowel sounds are normal. She exhibits no distension.   Musculoskeletal: She exhibits no edema.   Neurological: She is alert and oriented to person, place, and time. No cranial nerve deficit. Coordination normal.   Skin: Skin is dry.   Nursing note and vitals reviewed.            Laboratory results reviewed: d/w Pt  Lab Results   Component Value Date/Time    CREATININE 1.86 (H) 10/11/2017 11:22 AM    POTASSIUM 4.4 10/11/2017 11:22 AM       Assessment/Plan:     1. CKD (chronic kidney disease), stage III        - URINALYSIS; Future  - BASIC METABOLIC PANEL; Future  - PTH INTACT (PTH ONLY); Future  - US-RENAL; Future    2. SHAWN (acute kidney injury) (CMS-[facility])      Avoid NSAID's, d/c Triamterene/HCTZ  - US-RENAL; Future    3. Gout of both feet      To monitor uric acid, add allopurinol if elevated  - US-RENAL; Future  - URIC ACID; Future    4. Essential hypertension      BP well controlled    5. Vitamin D deficiency    - PTH INTACT (PTH ONLY); Future  - VITAMIN D 25-HYDROXY    6. Anemia, unspecified type    - CBC WITHOUT DIFFERENTIAL; Future    7. Borderline high calcium -d/c HCTZ    Recs: d/c triamterene/HCTZ, keep well hydrated, monitor BP and              Bring readings with next appointment             F/u in 2 weeks    Thank you for the consult

## 2017-11-30 ENCOUNTER — HOSPITAL ENCOUNTER (OUTPATIENT)
Dept: RADIOLOGY | Facility: MEDICAL CENTER | Age: 77
End: 2017-11-30
Attending: INTERNAL MEDICINE
Payer: MEDICARE

## 2017-11-30 DIAGNOSIS — N18.30 CKD (CHRONIC KIDNEY DISEASE), STAGE III (HCC): ICD-10-CM

## 2017-11-30 DIAGNOSIS — M10.9 GOUT OF BOTH FEET: ICD-10-CM

## 2017-11-30 DIAGNOSIS — N17.9 AKI (ACUTE KIDNEY INJURY) (HCC): ICD-10-CM

## 2017-11-30 PROCEDURE — 76775 US EXAM ABDO BACK WALL LIM: CPT

## 2017-12-12 DIAGNOSIS — M10.9 GOUT, ARTHRITIS: ICD-10-CM

## 2017-12-12 NOTE — TELEPHONE ENCOUNTER
Was the patient seen in the last year in this department? Yes     Does patient have an active prescription for medications requested? No     Received Request Via: Pharmacy    Pt met protocol?: Yes     Last OV 10/18/2017

## 2017-12-13 RX ORDER — METHYLPREDNISOLONE 4 MG/1
TABLET ORAL
Qty: 21 TAB | Refills: 0 | OUTPATIENT
Start: 2017-12-13

## 2017-12-14 ENCOUNTER — OFFICE VISIT (OUTPATIENT)
Dept: NEPHROLOGY | Facility: MEDICAL CENTER | Age: 77
End: 2017-12-14
Payer: MEDICARE

## 2017-12-14 VITALS
DIASTOLIC BLOOD PRESSURE: 80 MMHG | WEIGHT: 133 LBS | HEIGHT: 61 IN | OXYGEN SATURATION: 99 % | TEMPERATURE: 97 F | RESPIRATION RATE: 16 BRPM | HEART RATE: 84 BPM | SYSTOLIC BLOOD PRESSURE: 138 MMHG | BODY MASS INDEX: 25.11 KG/M2

## 2017-12-14 DIAGNOSIS — M10.9 GOUT, ARTHRITIS: ICD-10-CM

## 2017-12-14 DIAGNOSIS — M10.9 GOUT OF BOTH FEET: ICD-10-CM

## 2017-12-14 DIAGNOSIS — N18.30 CKD (CHRONIC KIDNEY DISEASE), STAGE III (HCC): ICD-10-CM

## 2017-12-14 DIAGNOSIS — I10 ESSENTIAL HYPERTENSION: ICD-10-CM

## 2017-12-14 DIAGNOSIS — E83.52 HYPERCALCEMIA: ICD-10-CM

## 2017-12-14 PROCEDURE — 99214 OFFICE O/P EST MOD 30 MIN: CPT | Performed by: INTERNAL MEDICINE

## 2017-12-14 RX ORDER — ALLOPURINOL 100 MG/1
100 TABLET ORAL DAILY
Qty: 30 TAB | Refills: 6 | Status: SHIPPED | OUTPATIENT
Start: 2017-12-14 | End: 2018-05-17 | Stop reason: SDUPTHER

## 2017-12-14 RX ORDER — METHYLPREDNISOLONE 4 MG/1
TABLET ORAL
Qty: 21 TAB | Refills: 0 | Status: SHIPPED | OUTPATIENT
Start: 2017-12-14 | End: 2018-01-19

## 2017-12-14 ASSESSMENT — ENCOUNTER SYMPTOMS
MYALGIAS: 0
NAUSEA: 0
PALPITATIONS: 0
DIARRHEA: 0
CARDIOVASCULAR NEGATIVE: 1
WEIGHT LOSS: 0
HEMOPTYSIS: 0
CHILLS: 0
CONSTITUTIONAL NEGATIVE: 1
VOMITING: 0
EYES NEGATIVE: 1
COUGH: 0
ABDOMINAL PAIN: 0
BACK PAIN: 1
FEVER: 0
SORE THROAT: 0
SHORTNESS OF BREATH: 0
FLANK PAIN: 0
ORTHOPNEA: 0
RESPIRATORY NEGATIVE: 1
WHEEZING: 0

## 2017-12-14 NOTE — PROGRESS NOTES
"Subjective:      Danielle oJn is a 77 y.o. female who presents with Chronic Kidney Disease (Follow up)            Chronic Kidney Disease   Pertinent negatives include no abdominal pain, chest pain, chills, congestion, coughing, fever, myalgias, nausea, rash, sore throat or vomiting.     Danielle is coming today for f/u of CKD III with worsening creatinine level  Baseline creatinine level at 1.2's worse to 1.4 in March 2017  - recent one up to 1.86 - improved now to 1.2  Doing well, has no complaints, no difficulties to urinated  No dysuria/hematuria  Has hx/of gout attacks treated with prednisone/indocin  Also back pain treated with ibuprofen in the past stopped  HTN: BP slightly elevated    Review of Systems   Constitutional: Negative.  Negative for chills, fever, malaise/fatigue and weight loss.   HENT: Negative.  Negative for congestion, nosebleeds and sore throat.    Eyes: Negative.    Respiratory: Negative.  Negative for cough, hemoptysis, shortness of breath and wheezing.    Cardiovascular: Negative.  Negative for chest pain, palpitations, orthopnea and leg swelling.   Gastrointestinal: Negative for abdominal pain, diarrhea, nausea and vomiting.   Genitourinary: Negative.  Negative for dysuria, flank pain, frequency, hematuria and urgency.   Musculoskeletal: Positive for back pain. Negative for myalgias.   Skin: Negative.  Negative for itching and rash.   All other systems reviewed and are negative.         Objective:     /80   Pulse 84   Temp 36.1 °C (97 °F)   Resp 16   Ht 1.549 m (5' 1\")   Wt 60.3 kg (133 lb)   SpO2 99%   BMI 25.13 kg/m²      Physical Exam   Constitutional: She is oriented to person, place, and time. She appears well-developed and well-nourished.   HENT:   Head: Normocephalic and atraumatic.   Nose: Nose normal.   Mouth/Throat: Oropharynx is clear and moist.   Eyes: Conjunctivae and EOM are normal. Pupils are equal, round, and reactive to light.   Neck: Normal range of motion. " Neck supple.   Cardiovascular: Normal rate and regular rhythm.  Exam reveals no gallop and no friction rub.    No murmur heard.  Pulmonary/Chest: Effort normal and breath sounds normal. No respiratory distress. She has no wheezes. She has no rales.   Abdominal: Soft. Bowel sounds are normal. She exhibits no distension.   Musculoskeletal: She exhibits no edema.   Neurological: She is alert and oriented to person, place, and time. No cranial nerve deficit. Coordination normal.   Skin: Skin is dry.   Nursing note and vitals reviewed.            Laboratory results reviewed: d/w Pt  Lab Results   Component Value Date/Time    CREATININE 1.23 11/29/2017 02:44 PM    POTASSIUM 4.9 11/29/2017 02:44 PM       Assessment/Plan:     1. CKD (chronic kidney disease), stage III            Creat level sig. Improved -to monitor    2. Hypertension: still slightly elevated BP      To monitor closely and call clinic with reading    3. Gout of both feet      Added Allopurinol 100 mg po QD    4. Essential hypertension      BP well controlled    5. Vitamin D deficiency       Hold vit D for now due to hypercalcemia    6. Anemia, unspecified type       Hb stable      7. Borderline high calcium - avoid calcium, stop vit D    Recs: d/c triamterene/HCTZ, keep well hydrated, monitor BP and              Call clinic with readings              F/u in 3 months

## 2018-01-18 ENCOUNTER — OFFICE VISIT (OUTPATIENT)
Dept: NEPHROLOGY | Facility: MEDICAL CENTER | Age: 78
End: 2018-01-18
Payer: MEDICARE

## 2018-01-18 VITALS
DIASTOLIC BLOOD PRESSURE: 84 MMHG | TEMPERATURE: 98.6 F | OXYGEN SATURATION: 97 % | HEART RATE: 68 BPM | HEIGHT: 61 IN | BODY MASS INDEX: 24.55 KG/M2 | WEIGHT: 130 LBS | SYSTOLIC BLOOD PRESSURE: 138 MMHG

## 2018-01-18 DIAGNOSIS — N18.30 CKD (CHRONIC KIDNEY DISEASE), STAGE III (HCC): ICD-10-CM

## 2018-01-18 DIAGNOSIS — I10 ESSENTIAL HYPERTENSION: ICD-10-CM

## 2018-01-18 DIAGNOSIS — E83.52 HYPERCALCEMIA: ICD-10-CM

## 2018-01-18 DIAGNOSIS — M10.9 GOUT OF BOTH FEET: ICD-10-CM

## 2018-01-18 PROCEDURE — 99213 OFFICE O/P EST LOW 20 MIN: CPT | Performed by: INTERNAL MEDICINE

## 2018-01-18 ASSESSMENT — ENCOUNTER SYMPTOMS
ORTHOPNEA: 0
DIZZINESS: 0
HEADACHES: 1
SENSORY CHANGE: 0
EYES NEGATIVE: 1
WEIGHT LOSS: 0
WHEEZING: 0
ABDOMINAL PAIN: 0
PALPITATIONS: 0
CARDIOVASCULAR NEGATIVE: 1
MYALGIAS: 0
NAUSEA: 0
DIARRHEA: 0
CONSTITUTIONAL NEGATIVE: 1
FLANK PAIN: 0
HYPERTENSION: 1
RESPIRATORY NEGATIVE: 1
SHORTNESS OF BREATH: 0
SORE THROAT: 0
CHILLS: 0
BACK PAIN: 1
VOMITING: 0
FOCAL WEAKNESS: 0
FEVER: 0
HEMOPTYSIS: 0
COUGH: 0

## 2018-01-18 NOTE — PROGRESS NOTES
"Subjective:      Danielle Jon is a 77 y.o. female who presents with Follow-Up (CKD) and Hypertension            Hypertension   Associated symptoms include headaches. Pertinent negatives include no chest pain, malaise/fatigue, orthopnea, palpitations or shortness of breath.     Danielle is coming today for f/u of elevated BP   States her BP runs at 180's/80  Asked to bring BP manometer  Doing well, has no complaints, no difficulties to urinate  No dysuria/hematuria  HTN: BP well controlled in the clinic manometer  Elevated on Pt BP manometer  Review of Systems   Constitutional: Negative.  Negative for chills, fever, malaise/fatigue and weight loss.   HENT: Negative.  Negative for congestion, nosebleeds and sore throat.    Eyes: Negative.    Respiratory: Negative.  Negative for cough, hemoptysis, shortness of breath and wheezing.    Cardiovascular: Negative.  Negative for chest pain, palpitations, orthopnea and leg swelling.   Gastrointestinal: Negative for abdominal pain, diarrhea, nausea and vomiting.   Genitourinary: Negative.  Negative for dysuria, flank pain, frequency, hematuria and urgency.   Musculoskeletal: Positive for back pain. Negative for myalgias.   Skin: Negative.  Negative for itching and rash.   Neurological: Positive for headaches. Negative for dizziness, sensory change and focal weakness.   All other systems reviewed and are negative.         Objective:     /84   Pulse 68   Temp 37 °C (98.6 °F)   Ht 1.549 m (5' 1\")   Wt 59 kg (130 lb)   SpO2 97%   BMI 24.56 kg/m²      Physical Exam   Constitutional: She is oriented to person, place, and time. She appears well-developed and well-nourished.   HENT:   Head: Normocephalic and atraumatic.   Nose: Nose normal.   Mouth/Throat: Oropharynx is clear and moist.   Eyes: Conjunctivae and EOM are normal. Pupils are equal, round, and reactive to light.   Neck: Normal range of motion. Neck supple.   Cardiovascular: Normal rate and regular rhythm.  " Exam reveals no gallop and no friction rub.    No murmur heard.  Pulmonary/Chest: Effort normal and breath sounds normal. No respiratory distress. She has no wheezes. She has no rales.   Abdominal: Soft. Bowel sounds are normal. She exhibits no distension.   Musculoskeletal: She exhibits no edema.   Neurological: She is alert and oriented to person, place, and time. No cranial nerve deficit. Coordination normal.   Skin: Skin is dry.   Nursing note and vitals reviewed.            Laboratory results reviewed: d/w Pt  Lab Results   Component Value Date/Time    CREATININE 1.23 11/29/2017 02:44 PM    POTASSIUM 4.9 11/29/2017 02:44 PM       Assessment/Plan:     1. CKD (chronic kidney disease), stage III            4. Essential hypertension      BP well controlled      Recs: to monitor BP in medical office and call clinic with readings             Continue current medications             F/u in 2 months

## 2018-01-19 ENCOUNTER — OFFICE VISIT (OUTPATIENT)
Dept: MEDICAL GROUP | Facility: PHYSICIAN GROUP | Age: 78
End: 2018-01-19
Payer: MEDICARE

## 2018-01-19 ENCOUNTER — TELEPHONE (OUTPATIENT)
Dept: MEDICAL GROUP | Facility: PHYSICIAN GROUP | Age: 78
End: 2018-01-19

## 2018-01-19 VITALS
HEART RATE: 63 BPM | BODY MASS INDEX: 24.92 KG/M2 | WEIGHT: 132 LBS | HEIGHT: 61 IN | TEMPERATURE: 98.7 F | SYSTOLIC BLOOD PRESSURE: 118 MMHG | DIASTOLIC BLOOD PRESSURE: 78 MMHG | OXYGEN SATURATION: 98 % | RESPIRATION RATE: 16 BRPM

## 2018-01-19 DIAGNOSIS — M10.9 GOUT OF BOTH FEET: ICD-10-CM

## 2018-01-19 DIAGNOSIS — M10.9 GOUT, ARTHRITIS: ICD-10-CM

## 2018-01-19 DIAGNOSIS — N18.30 CKD (CHRONIC KIDNEY DISEASE), STAGE III (HCC): ICD-10-CM

## 2018-01-19 DIAGNOSIS — I10 ESSENTIAL HYPERTENSION: ICD-10-CM

## 2018-01-19 DIAGNOSIS — E78.1 HYPERTRIGLYCERIDEMIA: ICD-10-CM

## 2018-01-19 PROCEDURE — 99214 OFFICE O/P EST MOD 30 MIN: CPT | Performed by: INTERNAL MEDICINE

## 2018-01-19 RX ORDER — METHYLPREDNISOLONE 4 MG/1
TABLET ORAL
Qty: 21 TAB | Refills: 0 | Status: CANCELLED | OUTPATIENT
Start: 2018-01-19

## 2018-01-19 RX ORDER — IBUPROFEN 600 MG/1
600 TABLET ORAL
COMMUNITY
Start: 2015-10-29 | End: 2018-01-19

## 2018-01-19 RX ORDER — COLCHICINE 0.6 MG/1
0.6 TABLET ORAL DAILY
Qty: 21 TAB | Refills: 1 | Status: SHIPPED | OUTPATIENT
Start: 2018-01-19 | End: 2018-01-22

## 2018-01-19 NOTE — PROGRESS NOTES
[facility] NEW PATIENT H&P  Chief Complaint   Patient presents with   • Gout     History of Present Illness     Gout of both feet  Just started allopurinol about 3 weeks ago by her nephrologist. Has had 3 gout flares of her feet since September. Hasn't had any gout flares since starting allopurinol. Drinks several glasses of wine a night. Has used medrol dose packs in the past for acute flares.     Essential hypertension  Is on metoprolol and losartan. Her nephrologist discontinued her Maxide about 2 weeks ago.     Hypertriglyceridemia  Has been taking simvastatin 10 mg every other day. Started this about 2 months ago.     Hypothyroidism  Has been stable at levothyroxine 50 mcg for several years.     Current Outpatient Prescriptions   Medication Sig Dispense Refill   • colchicine (COLCRYS) 0.6 MG Tab Take 1 Tab by mouth every day. 21 Tab 1   • allopurinol (ZYLOPRIM) 100 MG Tab Take 1 Tab by mouth every day. 30 Tab 6   • losartan (COZAAR) 100 MG Tab Take 1 Tab by mouth every day. 90 Tab 1   • metoprolol SR (TOPROL XL) 50 MG TABLET SR 24 HR Take 1 Tab by mouth every day. 90 Tab 3   • cyanocobalamin (VITAMIN B-12) 500 MCG Tab Take 500 mcg by mouth every day.     • levothyroxine (SYNTHROID) 50 MCG Tab Take 1 Tab by mouth Every morning on an empty stomach. Indications: Underactive Thyroid 90 Tab 2   • simvastatin (ZOCOR) 10 MG Tab Take 1 Tab by mouth every evening. 30 Tab 11   • aspirin 81 MG tablet Take 81 mg by mouth every day.     • Cholecalciferol (VITAMIN D3) 1000 UNITS Cap Take 2,000 Units by mouth every day.     • Cetirizine HCl 10 MG Cap Take  by mouth.       No current facility-administered medications for this visit.        Past Medical History:   Diagnosis Date   • CKD (chronic kidney disease), stage III 11/29/2017   • Essential hypertension 2/19/2016   • Gout of both feet 6/6/2017   • Hypercalcemia 12/14/2017   • Hypertriglyceridemia 4/4/2017   • Hypothyroidism 2/19/2016     Past Surgical History:  "  Procedure Laterality Date   • CATARACT EXTRACTION WITH IOL Bilateral    • EYE SURGERY      lasix    • MAMMOPLASTY REDUCTION       Social History   Substance Use Topics   • Smoking status: Never Smoker   • Smokeless tobacco: Never Used   • Alcohol use 8.4 oz/week     14 Glasses of wine per week     Social History     Social History Narrative    Retired from [employer]      Family History   Problem Relation Age of Onset   • Hypertension Mother    • Heart Disease Mother      chf   • Lung Disease Father    • Hypertension Sister    • Hyperlipidemia Sister    • Hypertension Brother      Family Status   Relation Status   • Mother  at age 89    chf   • Father  at age 63    emphem   • Sister  at age 85   • Brother Alive     Allergies: Niacin    ROS  Constitutional: Negative for fatigue/generalized weakness.   HEENT: Negative for  vision changes, hearing changes    Respiratory: Negative for shortness of breath  Cardiovascular: Negative for chest pain, palpitations  Gastrointestinal: Negative for blood in stool, constipation, diarrhea  Genitourinary: Negative for dysuria, polyuria  Musculoskeletal: Negative for myalgias, back pain, and joint pain.   Skin: Negative for rash  Neurological: Negative for numbness, tingling  Psychiatric/Behavioral: Negative for depression, anxiety      Objective   Blood pressure 118/78, pulse 63, temperature 37.1 °C (98.7 °F), resp. rate 16, height 1.549 m (5' 1\"), weight 59.9 kg (132 lb), SpO2 98 %. Body mass index is 24.94 kg/m².    General: Alert, oriented. In no acute distress   HEET: EOMI, PERRL, conjunctiva non-injected, sclera non-icteric.  Nares patent with no significant congestion or drainage.  Adrienne pinnae, external auditory canals, TM pearly gray with normal light reflex bilaterally.Oral mucous membranes pink and moist with no lesions.  Neck: supple with no cervical, subclavicular lymphadenopathy, JVD, palpable thyroid nodules   Lungs: clear to auscultation " bilaterally with good excursion.  CV: regular rate and rhythm.  Abdomen soft, non-distended, non-tender with normal bowel sounds. No hepatosplenomegaly, no masses palpated  Skin: no lesions. Warm, dry   Psychiatric: appropriate mood and affect       Assessment and Plan   The following treatment plan was discussed     1. Gout, arthritis  Counseled patient to cut back on wine (has several glasses a night) and she expressed understanding; wasn't aware of the association between alcohol use and gout before. Has limited red meat intake. Given colchicine and explained dosing regimen for acute flares.   - colchicine (COLCRYS) 0.6 MG Tab; Take 1 Tab by mouth every day.  Dispense: 21 Tab; Refill: 1    2. CKD (chronic kidney disease), stage III  Follows with Dr. Wilkerson who recently discontinued her Maxide and dosed her allopurinol at 100 mg daily.     3. Essential hypertension  Currently at goal on losartan and metoprolol, blood pressure 118/78 today.     4. Hypertriglyceridemia  Has been taking simvastatin 10 mg every other day starting 2 months ago, will recheck at next visit in 6 months.       Return in about 6 months (around 7/19/2018).    Health Maintenance    There are no preventive care reminders to display for this patient.    Daron Correia MD  Internal Medicine  [facility]

## 2018-01-19 NOTE — ASSESSMENT & PLAN NOTE
Just started allopurinol about 3 weeks ago by her nephrologist. Has had 3 gout flares of her feet since September. Hasn't had any gout flares since starting allopurinol. Drinks several glasses of wine a night. Has used medrol dose packs in the past for acute flares.

## 2018-01-19 NOTE — TELEPHONE ENCOUNTER
1. Caller Name: Adan                                         Call Back Number: [phone]      Patient approves a detailed voicemail message: N\A    Pharmacy states they received rx for colchicine and states it hits for major interaction with one of her statin medications.  They also state if rx is to be filled they need clarification on directions.

## 2018-01-22 ENCOUNTER — TELEPHONE (OUTPATIENT)
Dept: MEDICAL GROUP | Facility: PHYSICIAN GROUP | Age: 78
End: 2018-01-22

## 2018-01-22 DIAGNOSIS — M10.9 GOUT OF BOTH FEET: ICD-10-CM

## 2018-01-22 RX ORDER — NAPROXEN 500 MG/1
TABLET ORAL
Qty: 60 TAB | Refills: 1 | Status: SHIPPED | OUTPATIENT
Start: 2018-01-22 | End: 2018-01-23 | Stop reason: SDUPTHER

## 2018-01-22 NOTE — TELEPHONE ENCOUNTER
1. Caller Name: Adan                                         Call Back Number: [phone]      Patient approves a detailed voicemail message: N\A    Pharmacy received rx for naproxen with instructions Use as needed for acute gout attacks.  They state they cannot bill insurance without specific directions.  Please clarify.

## 2018-01-23 RX ORDER — NAPROXEN 500 MG/1
500 TABLET ORAL 3 TIMES DAILY PRN
Qty: 60 TAB | Refills: 1 | Status: SHIPPED | OUTPATIENT
Start: 2018-01-23 | End: 2018-10-18

## 2018-01-24 ENCOUNTER — TELEPHONE (OUTPATIENT)
Dept: NEPHROLOGY | Facility: MEDICAL CENTER | Age: 78
End: 2018-01-24

## 2018-01-24 NOTE — TELEPHONE ENCOUNTER
Patient called her blood at her doctor office was 118/78 was on Friday and at home she check again it was at 138/75   And she on a new medication she wants to know if its ok to take it naproxen 500mg  Please call patient   Thank you

## 2018-03-06 DIAGNOSIS — I10 ESSENTIAL HYPERTENSION: ICD-10-CM

## 2018-03-07 ENCOUNTER — HOSPITAL ENCOUNTER (OUTPATIENT)
Dept: LAB | Facility: MEDICAL CENTER | Age: 78
End: 2018-03-07
Attending: INTERNAL MEDICINE
Payer: MEDICARE

## 2018-03-07 DIAGNOSIS — N18.30 CKD (CHRONIC KIDNEY DISEASE), STAGE III (HCC): ICD-10-CM

## 2018-03-07 DIAGNOSIS — E83.52 HYPERCALCEMIA: ICD-10-CM

## 2018-03-07 DIAGNOSIS — M10.9 GOUT OF BOTH FEET: ICD-10-CM

## 2018-03-07 LAB
ANION GAP SERPL CALC-SCNC: 9 MMOL/L (ref 0–11.9)
BUN SERPL-MCNC: 19 MG/DL (ref 8–22)
CALCIUM SERPL-MCNC: 9.8 MG/DL (ref 8.5–10.5)
CHLORIDE SERPL-SCNC: 109 MMOL/L (ref 96–112)
CO2 SERPL-SCNC: 23 MMOL/L (ref 20–33)
CREAT SERPL-MCNC: 1.15 MG/DL (ref 0.5–1.4)
GLUCOSE SERPL-MCNC: 175 MG/DL (ref 65–99)
POTASSIUM SERPL-SCNC: 3.6 MMOL/L (ref 3.6–5.5)
PTH-INTACT SERPL-MCNC: 77.3 PG/ML (ref 14–72)
SODIUM SERPL-SCNC: 141 MMOL/L (ref 135–145)
URATE SERPL-MCNC: 4.1 MG/DL (ref 1.9–8.2)

## 2018-03-07 PROCEDURE — 80048 BASIC METABOLIC PNL TOTAL CA: CPT

## 2018-03-07 PROCEDURE — 36415 COLL VENOUS BLD VENIPUNCTURE: CPT

## 2018-03-07 PROCEDURE — 83970 ASSAY OF PARATHORMONE: CPT

## 2018-03-07 PROCEDURE — 84550 ASSAY OF BLOOD/URIC ACID: CPT

## 2018-03-07 RX ORDER — LEVOTHYROXINE SODIUM 0.05 MG/1
50 TABLET ORAL
Qty: 90 TAB | Refills: 1 | Status: SHIPPED | OUTPATIENT
Start: 2018-03-07 | End: 2018-09-15 | Stop reason: SDUPTHER

## 2018-03-07 RX ORDER — LOSARTAN POTASSIUM 100 MG/1
100 TABLET ORAL DAILY
Qty: 90 TAB | Refills: 1 | Status: SHIPPED | OUTPATIENT
Start: 2018-03-07 | End: 2018-09-15 | Stop reason: SDUPTHER

## 2018-03-07 NOTE — TELEPHONE ENCOUNTER
Was the patient seen in the last year in this department? Yes     Does patient have an active prescription for medications requested? No     Received Request Via: Pharmacy      Pt met protocol?: Yes, labs 10/17 tsh 2.760, ov 1/18 bp 118/78

## 2018-03-08 NOTE — TELEPHONE ENCOUNTER
Refill X 6 months, sent to pharmacy.Pt. Seen in the last 6 months per protocol. TSH was 2.76 (10/17).  Lab Results   Component Value Date/Time    SODIUM 142 11/29/2017 02:44 PM    POTASSIUM 4.9 11/29/2017 02:44 PM    CHLORIDE 110 11/29/2017 02:44 PM    CO2 23 11/29/2017 02:44 PM    GLUCOSE 88 11/29/2017 02:44 PM    BUN 28 (H) 11/29/2017 02:44 PM    CREATININE 1.23 11/29/2017 02:44 PM

## 2018-03-14 ENCOUNTER — OFFICE VISIT (OUTPATIENT)
Dept: NEPHROLOGY | Facility: MEDICAL CENTER | Age: 78
End: 2018-03-14
Payer: MEDICARE

## 2018-03-14 VITALS
WEIGHT: 126 LBS | BODY MASS INDEX: 23.79 KG/M2 | HEART RATE: 66 BPM | OXYGEN SATURATION: 98 % | DIASTOLIC BLOOD PRESSURE: 58 MMHG | TEMPERATURE: 99 F | HEIGHT: 61 IN | SYSTOLIC BLOOD PRESSURE: 138 MMHG | RESPIRATION RATE: 14 BRPM

## 2018-03-14 DIAGNOSIS — M10.9 GOUT OF BOTH FEET: ICD-10-CM

## 2018-03-14 DIAGNOSIS — I10 ESSENTIAL HYPERTENSION: ICD-10-CM

## 2018-03-14 DIAGNOSIS — R80.9 MICROALBUMINURIA: ICD-10-CM

## 2018-03-14 DIAGNOSIS — E55.9 VITAMIN D DEFICIENCY: ICD-10-CM

## 2018-03-14 DIAGNOSIS — N18.30 CKD (CHRONIC KIDNEY DISEASE), STAGE III (HCC): ICD-10-CM

## 2018-03-14 DIAGNOSIS — E83.52 HYPERCALCEMIA: ICD-10-CM

## 2018-03-14 PROCEDURE — 99214 OFFICE O/P EST MOD 30 MIN: CPT | Performed by: INTERNAL MEDICINE

## 2018-03-14 ASSESSMENT — ENCOUNTER SYMPTOMS
EYES NEGATIVE: 1
HEMOPTYSIS: 0
MYALGIAS: 0
WHEEZING: 0
SORE THROAT: 0
DIZZINESS: 0
CHILLS: 0
CARDIOVASCULAR NEGATIVE: 1
HYPERTENSION: 1
VOMITING: 0
CONSTITUTIONAL NEGATIVE: 1
FLANK PAIN: 0
FEVER: 0
FOCAL WEAKNESS: 0
BACK PAIN: 1
NAUSEA: 0
SENSORY CHANGE: 0
ABDOMINAL PAIN: 0
DIARRHEA: 0
RESPIRATORY NEGATIVE: 1
ORTHOPNEA: 0
PALPITATIONS: 0
WEIGHT LOSS: 0
SHORTNESS OF BREATH: 0
COUGH: 0

## 2018-03-14 NOTE — PROGRESS NOTES
"Subjective:      Danielle Jon is a 77 y.o. female who presents with Follow-Up and Chronic Kidney Disease            Hypertension   Pertinent negatives include no chest pain, malaise/fatigue, orthopnea, palpitations or shortness of breath.   Chronic Kidney Disease   Pertinent negatives include no abdominal pain, chest pain, chills, congestion, coughing, fever, myalgias, nausea, rash, sore throat or vomiting.     Danielle is coming today for f/u of HTN, CKD III  Doing well, has no complaints, no difficulties to urinate  No dysuria/hematuria  HTN: BP well controlled   CKD III -creat level improved to 1.15  Review of Systems   Constitutional: Negative.  Negative for chills, fever, malaise/fatigue and weight loss.   HENT: Negative.  Negative for congestion, nosebleeds and sore throat.    Eyes: Negative.    Respiratory: Negative.  Negative for cough, hemoptysis, shortness of breath and wheezing.    Cardiovascular: Negative.  Negative for chest pain, palpitations, orthopnea and leg swelling.   Gastrointestinal: Negative for abdominal pain, diarrhea, nausea and vomiting.   Genitourinary: Negative.  Negative for dysuria, flank pain, frequency, hematuria and urgency.   Musculoskeletal: Positive for back pain. Negative for myalgias.   Skin: Negative.  Negative for itching and rash.   Neurological: Negative for dizziness, sensory change and focal weakness.   All other systems reviewed and are negative.         Objective:     /58   Pulse 66   Temp 37.2 °C (99 °F)   Resp 14   Ht 1.549 m (5' 1\")   Wt 57.2 kg (126 lb)   SpO2 98%   BMI 23.81 kg/m²      Physical Exam   Constitutional: She is oriented to person, place, and time. She appears well-developed and well-nourished.   HENT:   Head: Normocephalic and atraumatic.   Nose: Nose normal.   Mouth/Throat: Oropharynx is clear and moist.   Eyes: Conjunctivae and EOM are normal. Pupils are equal, round, and reactive to light.   Neck: Normal range of motion. Neck supple. "   Cardiovascular: Normal rate and regular rhythm.  Exam reveals no gallop and no friction rub.    No murmur heard.  Pulmonary/Chest: Effort normal and breath sounds normal. No respiratory distress. She has no wheezes. She has no rales.   Abdominal: Soft. Bowel sounds are normal. She exhibits no distension.   Musculoskeletal: She exhibits no edema.   Neurological: She is alert and oriented to person, place, and time. No cranial nerve deficit. Coordination normal.   Skin: Skin is dry.   Nursing note and vitals reviewed.            Laboratory results reviewed: d/w Pt  Lab Results   Component Value Date/Time    CREATININE 1.15 03/07/2018 11:02 AM    POTASSIUM 3.6 03/07/2018 11:02 AM       Assessment/Plan:     1. CKD (chronic kidney disease), stage III      Creatinine level improved - to monitor      2. Essential hypertension      BP well controlled    3. Vit D def with elevated PTH -well controlled, PTH improved    Recs:              Continue current medications             F/u in 3 months with BMP , PTH , vit D, urine microalb/creat ratio

## 2018-03-19 ENCOUNTER — OFFICE VISIT (OUTPATIENT)
Dept: URGENT CARE | Facility: PHYSICIAN GROUP | Age: 78
End: 2018-03-19
Payer: MEDICARE

## 2018-03-19 VITALS
HEART RATE: 58 BPM | RESPIRATION RATE: 16 BRPM | DIASTOLIC BLOOD PRESSURE: 78 MMHG | BODY MASS INDEX: 23.98 KG/M2 | TEMPERATURE: 97.5 F | WEIGHT: 127 LBS | SYSTOLIC BLOOD PRESSURE: 132 MMHG | OXYGEN SATURATION: 98 % | HEIGHT: 61 IN

## 2018-03-19 DIAGNOSIS — M10.9 ACUTE GOUT OF RIGHT ELBOW, UNSPECIFIED CAUSE: ICD-10-CM

## 2018-03-19 PROCEDURE — 99214 OFFICE O/P EST MOD 30 MIN: CPT | Performed by: NURSE PRACTITIONER

## 2018-03-19 RX ORDER — METHYLPREDNISOLONE 4 MG/1
TABLET ORAL
Qty: 1 KIT | Refills: 0 | Status: SHIPPED | OUTPATIENT
Start: 2018-03-19 | End: 2018-10-18

## 2018-03-19 ASSESSMENT — ENCOUNTER SYMPTOMS
FEVER: 0
TINGLING: 0
SENSORY CHANGE: 0
FALLS: 0
BRUISES/BLEEDS EASILY: 0
MYALGIAS: 1
CHILLS: 0
WEAKNESS: 0

## 2018-03-19 NOTE — PROGRESS NOTES
Subjective:      Danielle Jon is a 77 y.o. female who presents with Arm Pain (Right elbow swelling, redness, warm to the touch)            HPI  Danielle is here for right elbow pain, redness and swelling x 4 days.  present. H/o gout but in feet. Taking allopurinol. Has had flare up in last year in feet. Denies trauma, injury or fall. Denies repetitive motion or heavy lifting. Very painful to touch. Swelling into hand.    PMH:  has a past medical history of CKD (chronic kidney disease), stage III (11/29/2017); Essential hypertension (2/19/2016); Gout of both feet (6/6/2017); Hypercalcemia (12/14/2017); Hypertriglyceridemia (4/4/2017); and Hypothyroidism (2/19/2016).  MEDS:   Current Outpatient Prescriptions:   •  MethylPREDNISolone (MEDROL DOSEPAK) 4 MG Tablet Therapy Pack, Use as directed, Disp: 1 Kit, Rfl: 0  •  levothyroxine (SYNTHROID) 50 MCG Tab, Take 1 Tab by mouth Every morning on an empty stomach., Disp: 90 Tab, Rfl: 1  •  losartan (COZAAR) 100 MG Tab, Take 1 Tab by mouth every day., Disp: 90 Tab, Rfl: 1  •  allopurinol (ZYLOPRIM) 100 MG Tab, Take 1 Tab by mouth every day., Disp: 30 Tab, Rfl: 6  •  metoprolol SR (TOPROL XL) 50 MG TABLET SR 24 HR, Take 1 Tab by mouth every day., Disp: 90 Tab, Rfl: 3  •  cyanocobalamin (VITAMIN B-12) 500 MCG Tab, Take 500 mcg by mouth every day., Disp: , Rfl:   •  simvastatin (ZOCOR) 10 MG Tab, Take 1 Tab by mouth every evening., Disp: 30 Tab, Rfl: 11  •  aspirin 81 MG tablet, Take 81 mg by mouth every day., Disp: , Rfl:   •  Acetaminophen (TYLENOL 8 HOUR PO), Take  by mouth., Disp: , Rfl:   •  naproxen (NAPROSYN) 500 MG Tab, Take 1 Tab by mouth 3 times a day as needed. Use as needed for acute gout attacks, Disp: 60 Tab, Rfl: 1  •  Cetirizine HCl 10 MG Cap, Take  by mouth., Disp: , Rfl:   •  Cholecalciferol (VITAMIN D3) 1000 UNITS Cap, Take 2,000 Units by mouth every day., Disp: , Rfl:   ALLERGIES:   Allergies   Allergen Reactions   • Niacin Rash and Swelling      "Rash/itching/swelling     SURGHX:   Past Surgical History:   Procedure Laterality Date   • CATARACT EXTRACTION WITH IOL Bilateral 2014   • EYE SURGERY      lasix    • MAMMOPLASTY REDUCTION       SOCHX:  reports that she has never smoked. She has never used smokeless tobacco. She reports that she drinks about 8.4 oz of alcohol per week . She reports that she does not use drugs.  FH: Family history was reviewed, no pertinent findings to report    Review of Systems   Constitutional: Negative for chills, fever and malaise/fatigue.   Musculoskeletal: Positive for joint pain and myalgias. Negative for falls.   Skin: Negative for itching and rash.   Neurological: Negative for tingling, sensory change and weakness.   Endo/Heme/Allergies: Does not bruise/bleed easily.   All other systems reviewed and are negative.         Objective:     /78   Pulse (!) 58   Temp 36.4 °C (97.5 °F)   Resp 16   Ht 1.549 m (5' 1\")   Wt 57.6 kg (127 lb)   SpO2 98%   BMI 24.00 kg/m²      Physical Exam   Constitutional: She is oriented to person, place, and time. Vital signs are normal. She appears well-developed and well-nourished. She is active and cooperative.  Non-toxic appearance. She does not have a sickly appearance. She does not appear ill. No distress.   HENT:   Head: Normocephalic.   Eyes: Conjunctivae and EOM are normal. Pupils are equal, round, and reactive to light.   Neck: Normal range of motion. Neck supple.   Cardiovascular: Normal rate and regular rhythm.    Pulmonary/Chest: Effort normal and breath sounds normal.   Musculoskeletal: She exhibits edema and tenderness. She exhibits no deformity.        Right elbow: She exhibits decreased range of motion and swelling. She exhibits no effusion, no deformity and no laceration. Tenderness found. Medial epicondyle and lateral epicondyle tenderness noted.   Slight none-erythematous swelling just inferior to left elbow joint and swelling to right hand. Decreased ROM with " flexion of joint due to pain. Very painful to light touch.   Neurological: She is alert and oriented to person, place, and time.   Skin: Skin is warm and dry. No rash noted. She is not diaphoretic. There is erythema.   Vitals reviewed.            Declines ace wrap to area due to painful to touch  Assessment/Plan:     1. Acute gout of right elbow, unspecified cause    - MethylPREDNISolone (MEDROL DOSEPAK) 4 MG Tablet Therapy Pack; Use as directed  Dispense: 1 Kit; Refill: 0    Avoid triggers for flare ups like alcohol, seafood, meat, processed foods  Maintain a healthy weight  D/c allopurinol with medrol pack, resume when done  Include the following in diet to decrease gout flare-ups:  ?Low-fat dairy products  ?Foods made with complex carbohydrates (whole grains, brown rice, oats, beans)  ?Only a moderate amount of wine (up to two 5-ounce servings per day [about 300 mL per day] since this is not likely to increase the risk of a gout attack)  ?Coffee (may decrease serum uric acid levels)  ?Vitamin C (500 mg per day has a mild urate-lowering effect (UP-TO-DATE)    Possible bursitis to elbow joint- monitor for increased swelling to elbow region, increased pain and redness- must recheck, will start medrol pack now and if no improvement in 2 days return for recheck, patient agrees to this plan of care

## 2018-05-17 DIAGNOSIS — E78.1 HYPERTRIGLYCERIDEMIA: ICD-10-CM

## 2018-05-17 NOTE — TELEPHONE ENCOUNTER
Last ov 1/9/18  Last labs 3/7/18    Lab Results  Component Value Date/Time   CHOLSTRLTOT 171 10/11/2017 1122   TRIGLYCERIDE 187 (H) 10/11/2017 1122   HDL 46 10/11/2017 1122   LDL 88 10/11/2017 1122

## 2018-05-17 NOTE — TELEPHONE ENCOUNTER
Was the patient seen in the last year in this department? Yes     Does patient have an active prescription for medications requested? No     Received Request Via: Patient  Patient requesting 90 days.

## 2018-05-18 RX ORDER — SIMVASTATIN 10 MG
10 TABLET ORAL EVERY EVENING
Qty: 90 TAB | Refills: 0 | Status: SHIPPED | OUTPATIENT
Start: 2018-05-18 | End: 2018-11-25 | Stop reason: SDUPTHER

## 2018-05-18 RX ORDER — ALLOPURINOL 100 MG/1
100 TABLET ORAL DAILY
Qty: 30 TAB | Refills: 2 | Status: SHIPPED | OUTPATIENT
Start: 2018-05-18 | End: 2018-05-21 | Stop reason: SDUPTHER

## 2018-05-21 RX ORDER — ALLOPURINOL 100 MG/1
100 TABLET ORAL DAILY
Qty: 90 TAB | Refills: 1 | Status: SHIPPED | OUTPATIENT
Start: 2018-05-21 | End: 2019-03-02 | Stop reason: SDUPTHER

## 2018-05-21 NOTE — TELEPHONE ENCOUNTER
Patient is asking for a 90 day supply on the Allopurinol.  Please approve if appropriate.  Thank you.

## 2018-06-07 ENCOUNTER — HOSPITAL ENCOUNTER (OUTPATIENT)
Dept: LAB | Facility: MEDICAL CENTER | Age: 78
End: 2018-06-07
Attending: INTERNAL MEDICINE
Payer: MEDICARE

## 2018-06-07 DIAGNOSIS — E83.52 HYPERCALCEMIA: ICD-10-CM

## 2018-06-07 DIAGNOSIS — N18.30 CKD (CHRONIC KIDNEY DISEASE), STAGE III (HCC): ICD-10-CM

## 2018-06-07 DIAGNOSIS — E55.9 VITAMIN D DEFICIENCY: ICD-10-CM

## 2018-06-07 LAB
25(OH)D3 SERPL-MCNC: 27 NG/ML (ref 30–100)
ANION GAP SERPL CALC-SCNC: 6 MMOL/L (ref 0–11.9)
BUN SERPL-MCNC: 23 MG/DL (ref 8–22)
CALCIUM SERPL-MCNC: 10.4 MG/DL (ref 8.5–10.5)
CHLORIDE SERPL-SCNC: 109 MMOL/L (ref 96–112)
CO2 SERPL-SCNC: 26 MMOL/L (ref 20–33)
CREAT SERPL-MCNC: 1.33 MG/DL (ref 0.5–1.4)
CREAT UR-MCNC: 60.5 MG/DL
GLUCOSE SERPL-MCNC: 116 MG/DL (ref 65–99)
MICROALBUMIN UR-MCNC: 1.3 MG/DL
MICROALBUMIN/CREAT UR: 21 MG/G (ref 0–30)
POTASSIUM SERPL-SCNC: 5 MMOL/L (ref 3.6–5.5)
PTH-INTACT SERPL-MCNC: 87.4 PG/ML (ref 14–72)
SODIUM SERPL-SCNC: 141 MMOL/L (ref 135–145)

## 2018-06-07 PROCEDURE — 82570 ASSAY OF URINE CREATININE: CPT

## 2018-06-07 PROCEDURE — 82043 UR ALBUMIN QUANTITATIVE: CPT

## 2018-06-07 PROCEDURE — 82306 VITAMIN D 25 HYDROXY: CPT

## 2018-06-07 PROCEDURE — 80048 BASIC METABOLIC PNL TOTAL CA: CPT

## 2018-06-07 PROCEDURE — 83970 ASSAY OF PARATHORMONE: CPT

## 2018-06-07 PROCEDURE — 36415 COLL VENOUS BLD VENIPUNCTURE: CPT

## 2018-06-14 ENCOUNTER — OFFICE VISIT (OUTPATIENT)
Dept: NEPHROLOGY | Facility: MEDICAL CENTER | Age: 78
End: 2018-06-14
Payer: MEDICARE

## 2018-06-14 VITALS
BODY MASS INDEX: 25.68 KG/M2 | RESPIRATION RATE: 14 BRPM | OXYGEN SATURATION: 98 % | TEMPERATURE: 98 F | HEART RATE: 64 BPM | SYSTOLIC BLOOD PRESSURE: 138 MMHG | WEIGHT: 136 LBS | HEIGHT: 61 IN | DIASTOLIC BLOOD PRESSURE: 74 MMHG

## 2018-06-14 DIAGNOSIS — E83.52 HYPERCALCEMIA: ICD-10-CM

## 2018-06-14 DIAGNOSIS — E21.1 HYPERPARATHYROIDISM DUE TO VITAMIN D DEFICIENCY (HCC): ICD-10-CM

## 2018-06-14 DIAGNOSIS — E55.9 VITAMIN D DEFICIENCY: ICD-10-CM

## 2018-06-14 DIAGNOSIS — N18.30 CKD (CHRONIC KIDNEY DISEASE), STAGE III (HCC): ICD-10-CM

## 2018-06-14 DIAGNOSIS — I10 ESSENTIAL HYPERTENSION: ICD-10-CM

## 2018-06-14 DIAGNOSIS — D64.9 ANEMIA, UNSPECIFIED TYPE: ICD-10-CM

## 2018-06-14 DIAGNOSIS — M10.9 GOUT OF BOTH FEET: ICD-10-CM

## 2018-06-14 PROCEDURE — 99214 OFFICE O/P EST MOD 30 MIN: CPT | Performed by: INTERNAL MEDICINE

## 2018-06-14 ASSESSMENT — ENCOUNTER SYMPTOMS
DIZZINESS: 0
RESPIRATORY NEGATIVE: 1
CARDIOVASCULAR NEGATIVE: 1
EYES NEGATIVE: 1
HEMOPTYSIS: 0
COUGH: 0
DIARRHEA: 0
CONSTITUTIONAL NEGATIVE: 1
WEIGHT LOSS: 0
BACK PAIN: 1
SENSORY CHANGE: 0
ORTHOPNEA: 0
VOMITING: 0
HYPERTENSION: 1
SORE THROAT: 0
CHILLS: 0
PALPITATIONS: 0
ABDOMINAL PAIN: 0
FLANK PAIN: 0
WHEEZING: 0
NAUSEA: 0
SHORTNESS OF BREATH: 0
FOCAL WEAKNESS: 0
FEVER: 0
MYALGIAS: 0

## 2018-06-14 NOTE — PROGRESS NOTES
"Subjective:      Danielle Jon is a 77 y.o. female who presents with Follow-Up; Chronic Kidney Disease; and Hypertension            Chronic Kidney Disease   Pertinent negatives include no abdominal pain, chest pain, chills, congestion, coughing, fever, myalgias, nausea, rash, sore throat or vomiting.   Hypertension   Pertinent negatives include no chest pain, malaise/fatigue, orthopnea, palpitations or shortness of breath.     Danielle is coming today for f/u of HTN, CKD III  Doing well, has no complaints, no difficulties to urinate  No dysuria/hematuria  HTN: BP well controlled   CKD III -creat level stable at baseline 1.2-1.3    Review of Systems   Constitutional: Negative.  Negative for chills, fever, malaise/fatigue and weight loss.   HENT: Negative.  Negative for congestion, nosebleeds and sore throat.    Eyes: Negative.    Respiratory: Negative.  Negative for cough, hemoptysis, shortness of breath and wheezing.    Cardiovascular: Negative.  Negative for chest pain, palpitations, orthopnea and leg swelling.   Gastrointestinal: Negative for abdominal pain, diarrhea, nausea and vomiting.   Genitourinary: Negative.  Negative for dysuria, flank pain, frequency, hematuria and urgency.   Musculoskeletal: Positive for back pain. Negative for myalgias.   Skin: Negative.  Negative for itching and rash.   Neurological: Negative for dizziness, sensory change and focal weakness.   All other systems reviewed and are negative.         Objective:     /74   Pulse 64   Temp 36.7 °C (98 °F)   Resp 14   Ht 1.549 m (5' 1\")   Wt 61.7 kg (136 lb)   SpO2 98%   BMI 25.70 kg/m²      Physical Exam   Constitutional: She is oriented to person, place, and time. She appears well-developed and well-nourished.   HENT:   Head: Normocephalic and atraumatic.   Nose: Nose normal.   Mouth/Throat: Oropharynx is clear and moist.   Eyes: Conjunctivae and EOM are normal. Pupils are equal, round, and reactive to light.   Neck: Normal " range of motion. Neck supple.   Cardiovascular: Normal rate and regular rhythm.  Exam reveals no gallop and no friction rub.    No murmur heard.  Pulmonary/Chest: Effort normal and breath sounds normal. No respiratory distress. She has no wheezes. She has no rales.   Abdominal: Soft. Bowel sounds are normal. She exhibits no distension.   Musculoskeletal: She exhibits no edema.   Neurological: She is alert and oriented to person, place, and time. No cranial nerve deficit. Coordination normal.   Skin: Skin is dry.   Nursing note and vitals reviewed.            Laboratory results reviewed: d/w Pt  Lab Results   Component Value Date/Time    CREATININE 1.33 06/07/2018 10:18 AM    POTASSIUM 5.0 06/07/2018 10:18 AM       Assessment/Plan:     1. CKD (chronic kidney disease), stage III      Creatinine level stable- to monitor      2. Essential hypertension      BP well controlled    3. Vit D def with elevated PTH -restart vit D 1000 units daily  4. Anemia : to complete Hb level before next visit  Recs:              Continue current medications             F/u in 4 months with BMP , PTH , vit D, urine microalb/creat ratio

## 2018-09-04 RX ORDER — METOPROLOL SUCCINATE 50 MG/1
50 TABLET, EXTENDED RELEASE ORAL DAILY
Qty: 90 TAB | Refills: 1 | Status: SHIPPED | OUTPATIENT
Start: 2018-09-04 | End: 2019-02-22 | Stop reason: SDUPTHER

## 2018-09-04 RX ORDER — METOPROLOL SUCCINATE 50 MG/1
50 TABLET, EXTENDED RELEASE ORAL DAILY
Qty: 90 TAB | Refills: 0 | Status: CANCELLED | OUTPATIENT
Start: 2018-09-04

## 2018-09-04 NOTE — TELEPHONE ENCOUNTER
Refill X 6 months, sent to pharmacy.Pt. Seen in the last 6 months per protocol.   Lab Results   Component Value Date/Time    SODIUM 141 06/07/2018 10:18 AM    POTASSIUM 5.0 06/07/2018 10:18 AM    CHLORIDE 109 06/07/2018 10:18 AM    CO2 26 06/07/2018 10:18 AM    GLUCOSE 116 (H) 06/07/2018 10:18 AM    BUN 23 (H) 06/07/2018 10:18 AM    CREATININE 1.33 06/07/2018 10:18 AM

## 2018-09-04 NOTE — TELEPHONE ENCOUNTER
Was the patient seen in the last year in this department? Yes    Does patient have an active prescription for medications requested? No     Received Request Via: Pharmacy      Pt met protocol?: Yes    OV 1/18    BP Readings from Last 1 Encounters:   06/14/18 138/74

## 2018-09-15 DIAGNOSIS — M10.9 GOUT OF BOTH FEET: ICD-10-CM

## 2018-09-15 DIAGNOSIS — E03.9 HYPOTHYROIDISM, UNSPECIFIED TYPE: ICD-10-CM

## 2018-09-15 DIAGNOSIS — I10 ESSENTIAL HYPERTENSION: ICD-10-CM

## 2018-09-15 DIAGNOSIS — E78.1 HYPERTRIGLYCERIDEMIA: ICD-10-CM

## 2018-09-17 RX ORDER — LEVOTHYROXINE SODIUM 0.05 MG/1
TABLET ORAL
Qty: 90 TAB | Refills: 0 | Status: SHIPPED | OUTPATIENT
Start: 2018-09-17 | End: 2018-12-09 | Stop reason: SDUPTHER

## 2018-09-17 RX ORDER — LOSARTAN POTASSIUM 100 MG/1
TABLET ORAL
Qty: 90 TAB | Refills: 0 | Status: SHIPPED | OUTPATIENT
Start: 2018-09-17 | End: 2018-12-09 | Stop reason: SDUPTHER

## 2018-10-01 ENCOUNTER — OFFICE VISIT (OUTPATIENT)
Dept: URGENT CARE | Facility: PHYSICIAN GROUP | Age: 78
End: 2018-10-01
Payer: MEDICARE

## 2018-10-01 ENCOUNTER — HOSPITAL ENCOUNTER (OUTPATIENT)
Dept: RADIOLOGY | Facility: MEDICAL CENTER | Age: 78
End: 2018-10-01
Attending: NURSE PRACTITIONER
Payer: MEDICARE

## 2018-10-01 VITALS
SYSTOLIC BLOOD PRESSURE: 142 MMHG | TEMPERATURE: 99 F | HEIGHT: 61 IN | DIASTOLIC BLOOD PRESSURE: 60 MMHG | HEART RATE: 67 BPM | BODY MASS INDEX: 24.35 KG/M2 | WEIGHT: 129 LBS | OXYGEN SATURATION: 99 % | RESPIRATION RATE: 16 BRPM

## 2018-10-01 DIAGNOSIS — R42 DIZZINESS: ICD-10-CM

## 2018-10-01 PROCEDURE — 99214 OFFICE O/P EST MOD 30 MIN: CPT | Performed by: NURSE PRACTITIONER

## 2018-10-01 PROCEDURE — 70450 CT HEAD/BRAIN W/O DYE: CPT

## 2018-10-01 ASSESSMENT — ENCOUNTER SYMPTOMS
SENSORY CHANGE: 0
SHORTNESS OF BREATH: 0
BLURRED VISION: 0
PALPITATIONS: 0
ABDOMINAL PAIN: 0
ORTHOPNEA: 0
MYALGIAS: 0
CHILLS: 0
DIZZINESS: 1
DOUBLE VISION: 0
FEVER: 0
WEAKNESS: 0
PHOTOPHOBIA: 0
VOMITING: 0
NECK PAIN: 0
TINGLING: 0
HEADACHES: 0
NAUSEA: 0

## 2018-10-01 NOTE — PROGRESS NOTES
Subjective:      Danielle Jon is a 78 y.o. female who presents with Head Pain (was kneelingdown, felt a sharpness in back of head, saw bright light then could not see for a few minutes, felt nauseated, has a crawling feeling in back of head x 1day, has tremors in hands on and off x 6 months wants flu shot)            HPI  Kneeling under table and looked  2-3 weeks of worms crawling in back of head- no pain at back of head, No HAs, no vision changes. No n/v, neck pain. Has episode of dizziness yesterday, no light sensitivity. At age 33: TIA/ severe left side head pain/confusion, lasted 30 min., treated for HTN at that time. Denies CP/pressure, SOB, no chest tightness. No ataxia. No changes in meds.    PMH:  has a past medical history of CKD (chronic kidney disease), stage III (HCC) (11/29/2017); Essential hypertension (2/19/2016); Gout of both feet (6/6/2017); Hypercalcemia (12/14/2017); Hypertriglyceridemia (4/4/2017); and Hypothyroidism (2/19/2016).  MEDS:   Current Outpatient Prescriptions:   •  levothyroxine (SYNTHROID) 50 MCG Tab, TAKE 1 TABLET BY MOUTH EVERY MORNING ON AN EMPTY STOMACH **GENERIC FOR SYNTHROID, Disp: 90 Tab, Rfl: 0  •  losartan (COZAAR) 100 MG Tab, TAKE ONE TABLET BY MOUTH EVERY DAY, Disp: 90 Tab, Rfl: 0  •  metoprolol SR (TOPROL XL) 50 MG TABLET SR 24 HR, Take 1 Tab by mouth every day., Disp: 90 Tab, Rfl: 1  •  allopurinol (ZYLOPRIM) 100 MG Tab, Take 1 Tab by mouth every day., Disp: 90 Tab, Rfl: 1  •  simvastatin (ZOCOR) 10 MG Tab, Take 1 Tab by mouth every evening., Disp: 90 Tab, Rfl: 0  •  cyanocobalamin (VITAMIN B-12) 500 MCG Tab, Take 500 mcg by mouth every day., Disp: , Rfl:   •  aspirin 81 MG tablet, Take 81 mg by mouth every day., Disp: , Rfl:   •  MethylPREDNISolone (MEDROL DOSEPAK) 4 MG Tablet Therapy Pack, Use as directed, Disp: 1 Kit, Rfl: 0  •  Acetaminophen (TYLENOL 8 HOUR PO), Take  by mouth., Disp: , Rfl:   •  naproxen (NAPROSYN) 500 MG Tab, Take 1 Tab by mouth 3 times a day as  "needed. Use as needed for acute gout attacks, Disp: 60 Tab, Rfl: 1  •  Cetirizine HCl 10 MG Cap, Take  by mouth., Disp: , Rfl:   •  Cholecalciferol (VITAMIN D3) 1000 UNITS Cap, Take 2,000 Units by mouth every day., Disp: , Rfl:   ALLERGIES:   Allergies   Allergen Reactions   • Niacin Rash and Swelling     Rash/itching/swelling     SURGHX:   Past Surgical History:   Procedure Laterality Date   • CATARACT EXTRACTION WITH IOL Bilateral 2014   • EYE SURGERY      lasix    • MAMMOPLASTY REDUCTION       SOCHX:  reports that she has never smoked. She has never used smokeless tobacco. She reports that she drinks about 8.4 oz of alcohol per week . She reports that she does not use drugs.  FH: Family history was reviewed, no pertinent findings to report    Review of Systems   Constitutional: Negative for chills, fever and malaise/fatigue.   Eyes: Negative for blurred vision, double vision and photophobia.   Respiratory: Negative for shortness of breath.    Cardiovascular: Negative for chest pain, palpitations, orthopnea and leg swelling.   Gastrointestinal: Negative for abdominal pain, nausea and vomiting.   Musculoskeletal: Negative for myalgias and neck pain.   Skin: Negative for itching and rash.   Neurological: Positive for dizziness. Negative for tingling, sensory change, weakness and headaches.   All other systems reviewed and are negative.         Objective:     /60 (BP Location: Left arm, Patient Position: Sitting, BP Cuff Size: Adult)   Pulse 67   Temp 37.2 °C (99 °F) (Temporal)   Resp 16   Ht 1.549 m (5' 1\")   Wt 58.5 kg (129 lb)   SpO2 99%   Breastfeeding? No   BMI 24.37 kg/m²      Physical Exam   Constitutional: She is oriented to person, place, and time. Vital signs are normal. She appears well-developed and well-nourished. She is active and cooperative. No distress.   HENT:   Head: Normocephalic.   Eyes: Pupils are equal, round, and reactive to light. Conjunctivae and EOM are normal.   Neck: Normal " range of motion. Neck supple.   Cardiovascular: Normal rate, regular rhythm and normal heart sounds.    Pulmonary/Chest: Effort normal and breath sounds normal. No respiratory distress. She has no wheezes. She has no rales.   Musculoskeletal: Normal range of motion.   Neurological: She is alert and oriented to person, place, and time. No cranial nerve deficit.   Skin: Skin is warm and dry. She is not diaphoretic.   Vitals reviewed.    CT head:1. No acute intracranial abnormality. No evidence of acute intracranial hemorrhage or mass lesion.    EKG: NSR, HR 55, borderline T abnormalities/left axis deviation          Assessment/Plan:     1. Dizziness, episode of     - CT-HEAD W/O; Future  - EKG - Clinic Performed    Follow up with PCP and Nephrologist this month after ordered blood work completed  Maintain water intake  Monitor for severe acute HA, n/v, weakness, change in mental status, CP- call 911 immediately

## 2018-10-10 ENCOUNTER — HOSPITAL ENCOUNTER (OUTPATIENT)
Dept: LAB | Facility: MEDICAL CENTER | Age: 78
End: 2018-10-10
Attending: INTERNAL MEDICINE
Payer: MEDICARE

## 2018-10-10 DIAGNOSIS — I10 ESSENTIAL HYPERTENSION: ICD-10-CM

## 2018-10-10 DIAGNOSIS — E03.9 HYPOTHYROIDISM, UNSPECIFIED TYPE: ICD-10-CM

## 2018-10-10 DIAGNOSIS — E21.1 HYPERPARATHYROIDISM DUE TO VITAMIN D DEFICIENCY (HCC): ICD-10-CM

## 2018-10-10 DIAGNOSIS — E78.1 HYPERTRIGLYCERIDEMIA: ICD-10-CM

## 2018-10-10 DIAGNOSIS — M10.9 GOUT OF BOTH FEET: ICD-10-CM

## 2018-10-10 DIAGNOSIS — N18.30 CKD (CHRONIC KIDNEY DISEASE), STAGE III (HCC): ICD-10-CM

## 2018-10-10 DIAGNOSIS — E83.52 HYPERCALCEMIA: ICD-10-CM

## 2018-10-10 DIAGNOSIS — D64.9 ANEMIA, UNSPECIFIED TYPE: ICD-10-CM

## 2018-10-10 LAB
25(OH)D3 SERPL-MCNC: 26 NG/ML (ref 30–100)
ALBUMIN SERPL BCP-MCNC: 4.4 G/DL (ref 3.2–4.9)
ALBUMIN/GLOB SERPL: 1.8 G/DL
ALP SERPL-CCNC: 51 U/L (ref 30–99)
ALT SERPL-CCNC: 18 U/L (ref 2–50)
ANION GAP SERPL CALC-SCNC: 11 MMOL/L (ref 0–11.9)
ANION GAP SERPL CALC-SCNC: 8 MMOL/L (ref 0–11.9)
APPEARANCE UR: CLEAR
AST SERPL-CCNC: 22 U/L (ref 12–45)
BACTERIA #/AREA URNS HPF: ABNORMAL /HPF
BILIRUB SERPL-MCNC: 0.9 MG/DL (ref 0.1–1.5)
BILIRUB UR QL STRIP.AUTO: NEGATIVE
BUN SERPL-MCNC: 20 MG/DL (ref 8–22)
BUN SERPL-MCNC: 20 MG/DL (ref 8–22)
CALCIUM SERPL-MCNC: 10.3 MG/DL (ref 8.5–10.5)
CALCIUM SERPL-MCNC: 10.3 MG/DL (ref 8.5–10.5)
CHLORIDE SERPL-SCNC: 107 MMOL/L (ref 96–112)
CHLORIDE SERPL-SCNC: 107 MMOL/L (ref 96–112)
CO2 SERPL-SCNC: 24 MMOL/L (ref 20–33)
CO2 SERPL-SCNC: 26 MMOL/L (ref 20–33)
COLOR UR: YELLOW
CREAT SERPL-MCNC: 1.18 MG/DL (ref 0.5–1.4)
CREAT SERPL-MCNC: 1.2 MG/DL (ref 0.5–1.4)
EPI CELLS #/AREA URNS HPF: ABNORMAL /HPF
ERYTHROCYTE [DISTWIDTH] IN BLOOD BY AUTOMATED COUNT: 48.1 FL (ref 35.9–50)
ERYTHROCYTE [DISTWIDTH] IN BLOOD BY AUTOMATED COUNT: 48.6 FL (ref 35.9–50)
FASTING STATUS PATIENT QL REPORTED: NORMAL
FASTING STATUS PATIENT QL REPORTED: NORMAL
GLOBULIN SER CALC-MCNC: 2.5 G/DL (ref 1.9–3.5)
GLUCOSE SERPL-MCNC: 65 MG/DL (ref 65–99)
GLUCOSE SERPL-MCNC: 67 MG/DL (ref 65–99)
GLUCOSE UR STRIP.AUTO-MCNC: NEGATIVE MG/DL
HCT VFR BLD AUTO: 42 % (ref 37–47)
HCT VFR BLD AUTO: 42.3 % (ref 37–47)
HGB BLD-MCNC: 14.2 G/DL (ref 12–16)
HGB BLD-MCNC: 14.2 G/DL (ref 12–16)
KETONES UR STRIP.AUTO-MCNC: NEGATIVE MG/DL
LEUKOCYTE ESTERASE UR QL STRIP.AUTO: ABNORMAL
MCH RBC QN AUTO: 33.3 PG (ref 27–33)
MCH RBC QN AUTO: 33.6 PG (ref 27–33)
MCHC RBC AUTO-ENTMCNC: 33.6 G/DL (ref 33.6–35)
MCHC RBC AUTO-ENTMCNC: 33.8 G/DL (ref 33.6–35)
MCV RBC AUTO: 99.3 FL (ref 81.4–97.8)
MCV RBC AUTO: 99.3 FL (ref 81.4–97.8)
MICRO URNS: ABNORMAL
NITRITE UR QL STRIP.AUTO: NEGATIVE
PH UR STRIP.AUTO: 6 [PH]
PLATELET # BLD AUTO: 206 K/UL (ref 164–446)
PLATELET # BLD AUTO: 211 K/UL (ref 164–446)
PMV BLD AUTO: 10.8 FL (ref 9–12.9)
PMV BLD AUTO: 11.1 FL (ref 9–12.9)
POTASSIUM SERPL-SCNC: 4 MMOL/L (ref 3.6–5.5)
POTASSIUM SERPL-SCNC: 4.1 MMOL/L (ref 3.6–5.5)
PROT SERPL-MCNC: 6.9 G/DL (ref 6–8.2)
PROT UR QL STRIP: NEGATIVE MG/DL
PTH-INTACT SERPL-MCNC: 89.9 PG/ML (ref 14–72)
RBC # BLD AUTO: 4.23 M/UL (ref 4.2–5.4)
RBC # BLD AUTO: 4.26 M/UL (ref 4.2–5.4)
RBC UR QL AUTO: NEGATIVE
SODIUM SERPL-SCNC: 141 MMOL/L (ref 135–145)
SODIUM SERPL-SCNC: 142 MMOL/L (ref 135–145)
SP GR UR STRIP.AUTO: 1.02
T4 FREE SERPL-MCNC: 1.01 NG/DL (ref 0.53–1.43)
TSH SERPL DL<=0.005 MIU/L-ACNC: 3.68 UIU/ML (ref 0.38–5.33)
URATE SERPL-MCNC: 4.1 MG/DL (ref 1.9–8.2)
UROBILINOGEN UR STRIP.AUTO-MCNC: 1 MG/DL
WBC # BLD AUTO: 5.4 K/UL (ref 4.8–10.8)
WBC # BLD AUTO: 5.4 K/UL (ref 4.8–10.8)
WBC #/AREA URNS HPF: ABNORMAL /HPF

## 2018-10-10 PROCEDURE — 36415 COLL VENOUS BLD VENIPUNCTURE: CPT

## 2018-10-10 PROCEDURE — 84439 ASSAY OF FREE THYROXINE: CPT

## 2018-10-10 PROCEDURE — 85027 COMPLETE CBC AUTOMATED: CPT | Mod: 91

## 2018-10-10 PROCEDURE — 85027 COMPLETE CBC AUTOMATED: CPT

## 2018-10-10 PROCEDURE — 83970 ASSAY OF PARATHORMONE: CPT

## 2018-10-10 PROCEDURE — 84550 ASSAY OF BLOOD/URIC ACID: CPT

## 2018-10-10 PROCEDURE — 84443 ASSAY THYROID STIM HORMONE: CPT

## 2018-10-10 PROCEDURE — 80053 COMPREHEN METABOLIC PANEL: CPT

## 2018-10-10 PROCEDURE — 81001 URINALYSIS AUTO W/SCOPE: CPT

## 2018-10-10 PROCEDURE — 80048 BASIC METABOLIC PNL TOTAL CA: CPT

## 2018-10-10 PROCEDURE — 82306 VITAMIN D 25 HYDROXY: CPT

## 2018-10-11 ENCOUNTER — HOSPITAL ENCOUNTER (OUTPATIENT)
Dept: LAB | Facility: MEDICAL CENTER | Age: 78
End: 2018-10-11
Attending: INTERNAL MEDICINE
Payer: MEDICARE

## 2018-10-11 DIAGNOSIS — E78.1 HYPERTRIGLYCERIDEMIA: ICD-10-CM

## 2018-10-11 LAB
CHOLEST SERPL-MCNC: 173 MG/DL (ref 100–199)
FASTING STATUS PATIENT QL REPORTED: NORMAL
HDLC SERPL-MCNC: 53 MG/DL
LDLC SERPL CALC-MCNC: 99 MG/DL
TRIGL SERPL-MCNC: 107 MG/DL (ref 0–149)

## 2018-10-11 PROCEDURE — 36415 COLL VENOUS BLD VENIPUNCTURE: CPT

## 2018-10-11 PROCEDURE — 80061 LIPID PANEL: CPT

## 2018-10-17 ENCOUNTER — OFFICE VISIT (OUTPATIENT)
Dept: NEPHROLOGY | Facility: MEDICAL CENTER | Age: 78
End: 2018-10-17
Payer: MEDICARE

## 2018-10-17 VITALS
DIASTOLIC BLOOD PRESSURE: 68 MMHG | HEART RATE: 68 BPM | SYSTOLIC BLOOD PRESSURE: 150 MMHG | HEIGHT: 61 IN | BODY MASS INDEX: 23.79 KG/M2 | OXYGEN SATURATION: 99 % | TEMPERATURE: 98.4 F | WEIGHT: 126 LBS | RESPIRATION RATE: 14 BRPM

## 2018-10-17 DIAGNOSIS — E55.9 VITAMIN D DEFICIENCY: ICD-10-CM

## 2018-10-17 DIAGNOSIS — E21.1 HYPERPARATHYROIDISM DUE TO VITAMIN D DEFICIENCY (HCC): ICD-10-CM

## 2018-10-17 DIAGNOSIS — N18.30 CKD (CHRONIC KIDNEY DISEASE), STAGE III (HCC): ICD-10-CM

## 2018-10-17 DIAGNOSIS — I10 ESSENTIAL HYPERTENSION: ICD-10-CM

## 2018-10-17 DIAGNOSIS — E83.52 HYPERCALCEMIA: ICD-10-CM

## 2018-10-17 DIAGNOSIS — M10.9 GOUT OF BOTH FEET: ICD-10-CM

## 2018-10-17 PROCEDURE — 99214 OFFICE O/P EST MOD 30 MIN: CPT | Performed by: INTERNAL MEDICINE

## 2018-10-17 ASSESSMENT — ENCOUNTER SYMPTOMS
MYALGIAS: 0
WHEEZING: 0
CHILLS: 0
DIZZINESS: 0
CONSTITUTIONAL NEGATIVE: 1
BACK PAIN: 1
SHORTNESS OF BREATH: 0
WEIGHT LOSS: 0
ORTHOPNEA: 0
FOCAL WEAKNESS: 0
DIARRHEA: 0
ABDOMINAL PAIN: 0
EYES NEGATIVE: 1
COUGH: 0
NAUSEA: 0
HYPERTENSION: 1
VOMITING: 0
SORE THROAT: 0
CARDIOVASCULAR NEGATIVE: 1
SENSORY CHANGE: 0
HEMOPTYSIS: 0
FEVER: 0
PALPITATIONS: 0
FLANK PAIN: 0
RESPIRATORY NEGATIVE: 1

## 2018-10-17 NOTE — PROGRESS NOTES
"Subjective:      Danielle Jon is a 78 y.o. female who presents with Follow-Up; Chronic Kidney Disease; and Hypertension            Chronic Kidney Disease   Pertinent negatives include no abdominal pain, chest pain, chills, congestion, coughing, fever, myalgias, nausea, rash, sore throat or vomiting.   Hypertension   Pertinent negatives include no chest pain, malaise/fatigue, orthopnea, palpitations or shortness of breath.     Danielle is coming today for f/u of HTN, CKD III  Doing well, has no complaints,   No difficulties to urinate  No dysuria/hematuria  HTN: BP well controlled   CKD III -creat level stable at baseline 1.2-1.3    Review of Systems   Constitutional: Negative.  Negative for chills, fever, malaise/fatigue and weight loss.   HENT: Negative.  Negative for congestion, nosebleeds and sore throat.    Eyes: Negative.    Respiratory: Negative.  Negative for cough, hemoptysis, shortness of breath and wheezing.    Cardiovascular: Negative.  Negative for chest pain, palpitations, orthopnea and leg swelling.   Gastrointestinal: Negative for abdominal pain, diarrhea, nausea and vomiting.   Genitourinary: Negative.  Negative for dysuria, flank pain, frequency, hematuria and urgency.   Musculoskeletal: Positive for back pain. Negative for myalgias.   Skin: Negative.  Negative for itching and rash.   Neurological: Negative for dizziness, sensory change and focal weakness.   All other systems reviewed and are negative.         Objective:     /68 (BP Location: Right arm, Patient Position: Sitting)   Pulse 68   Temp 36.9 °C (98.4 °F)   Resp 14   Ht 1.549 m (5' 1\")   Wt 57.2 kg (126 lb)   SpO2 99%   BMI 23.81 kg/m²      Physical Exam   Constitutional: She is oriented to person, place, and time. She appears well-developed and well-nourished.   HENT:   Head: Normocephalic and atraumatic.   Nose: Nose normal.   Mouth/Throat: Oropharynx is clear and moist.   Eyes: Pupils are equal, round, and reactive to " light. Conjunctivae and EOM are normal.   Neck: Normal range of motion. Neck supple.   Cardiovascular: Normal rate and regular rhythm.  Exam reveals no gallop and no friction rub.    No murmur heard.  Pulmonary/Chest: Effort normal and breath sounds normal. No respiratory distress. She has no wheezes. She has no rales.   Abdominal: Soft. Bowel sounds are normal. She exhibits no distension.   Musculoskeletal: She exhibits no edema.   Neurological: She is alert and oriented to person, place, and time. No cranial nerve deficit. Coordination normal.   Skin: Skin is dry.   Nursing note and vitals reviewed.            Laboratory results reviewed: d/w Pt  Lab Results   Component Value Date/Time    CREATININE 1.18 10/10/2018 10:38 AM    CREATININE 1.20 10/10/2018 10:38 AM    POTASSIUM 4.0 10/10/2018 10:38 AM    POTASSIUM 4.1 10/10/2018 10:38 AM       Assessment/Plan:     1. CKD (chronic kidney disease), stage III      Creatinine level stable- to monitor      2. Essential hypertension      BP well controlled    3. Vit D def with elevated PTH -restart vit D 1000 units daily    4. Anemia : Hb level WNL    Recs:              Continue current medications             F/u in 3-4 months with BMP , PTH , vit D, urine microalb/creat ratio

## 2018-10-18 ENCOUNTER — OFFICE VISIT (OUTPATIENT)
Dept: MEDICAL GROUP | Facility: PHYSICIAN GROUP | Age: 78
End: 2018-10-18
Payer: MEDICARE

## 2018-10-18 VITALS
DIASTOLIC BLOOD PRESSURE: 70 MMHG | TEMPERATURE: 98.1 F | BODY MASS INDEX: 23.98 KG/M2 | OXYGEN SATURATION: 99 % | HEART RATE: 68 BPM | HEIGHT: 61 IN | RESPIRATION RATE: 14 BRPM | WEIGHT: 127 LBS | SYSTOLIC BLOOD PRESSURE: 134 MMHG

## 2018-10-18 DIAGNOSIS — Z00.00 WELLNESS EXAMINATION: ICD-10-CM

## 2018-10-18 RX ORDER — TRIAMTERENE AND HYDROCHLOROTHIAZIDE 37.5; 25 MG/1; MG/1
1 TABLET ORAL
COMMUNITY
Start: 2015-10-23 | End: 2018-10-02

## 2018-10-18 ASSESSMENT — PATIENT HEALTH QUESTIONNAIRE - PHQ9: CLINICAL INTERPRETATION OF PHQ2 SCORE: 0

## 2018-10-18 NOTE — PROGRESS NOTES
PRIMARY CARE CLINIC ANNUAL EXAM  Chief Complaint   Patient presents with   • Annual Exam       History of Present Illness     Wellness examination  Danielle is here for her annual exam. She is feeling well and just had her blood work done recently. Reviewed and all stable. Danielle is feeling well and has no acute complaints.     Current Outpatient Prescriptions   Medication Sig Dispense Refill   • levothyroxine (SYNTHROID) 50 MCG Tab TAKE 1 TABLET BY MOUTH EVERY MORNING ON AN EMPTY STOMACH **GENERIC FOR SYNTHROID 90 Tab 0   • losartan (COZAAR) 100 MG Tab TAKE ONE TABLET BY MOUTH EVERY DAY 90 Tab 0   • metoprolol SR (TOPROL XL) 50 MG TABLET SR 24 HR Take 1 Tab by mouth every day. 90 Tab 1   • allopurinol (ZYLOPRIM) 100 MG Tab Take 1 Tab by mouth every day. 90 Tab 1   • simvastatin (ZOCOR) 10 MG Tab Take 1 Tab by mouth every evening. 90 Tab 0   • Acetaminophen (TYLENOL 8 HOUR PO) Take  by mouth.     • Cetirizine HCl 10 MG Cap Take  by mouth.     • cyanocobalamin (VITAMIN B-12) 500 MCG Tab Take 500 mcg by mouth every day.     • aspirin 81 MG tablet Take 81 mg by mouth every day.     • Cholecalciferol (VITAMIN D3) 1000 UNITS Cap Take 2,000 Units by mouth every day.       No current facility-administered medications for this visit.        Past Medical History:   Diagnosis Date   • CKD (chronic kidney disease), stage III (HCC) 11/29/2017   • Essential hypertension 2/19/2016   • Gout of both feet 6/6/2017   • Hypercalcemia 12/14/2017   • Hypertriglyceridemia 4/4/2017   • Hypothyroidism 2/19/2016     Past Surgical History:   Procedure Laterality Date   • CATARACT EXTRACTION WITH IOL Bilateral 2014   • EYE SURGERY      lasix    • MAMMOPLASTY REDUCTION       Social History   Substance Use Topics   • Smoking status: Never Smoker   • Smokeless tobacco: Never Used   • Alcohol use 8.4 oz/week     14 Glasses of wine per week     Social History     Social History Narrative    Retired from [employer]      Family History   Problem  "Relation Age of Onset   • Hypertension Mother    • Heart Disease Mother         chf   • Lung Disease Father    • Hypertension Sister    • Hyperlipidemia Sister    • Hypertension Brother      Family Status   Relation Status   • Mo  at age 89        chf   • Fa  at age 63        emphem   • Sis  at age 85   • Bro Alive     Allergies: Niacin    ROS  Constitutional: Negative for fatigue/generalized weakness.   HEENT: Negative for  vision changes, hearing changes    Respiratory: Negative for shortness of breath  Cardiovascular: Negative for chest pain, palpitations  Gastrointestinal: Negative for blood in stool, constipation, diarrhea  Genitourinary: Negative for dysuria, polyuria  Musculoskeletal: Negative for myalgias, back pain, and joint pain.   Skin: Negative for rash  Neurological: Negative for numbness, tingling  Psychiatric/Behavioral: Negative for depression, anxiety       Objective   Blood pressure 134/70, pulse 68, temperature 36.7 °C (98.1 °F), temperature source Temporal, resp. rate 14, height 1.549 m (5' 1\"), weight 57.6 kg (127 lb), SpO2 99 %, not currently breastfeeding. Body mass index is 24 kg/m².    General: Alert, oriented. In no acute distress   HEET: EOMI, PERRL, conjunctiva non-injected, sclera non-icteric.  Nares patent with no significant congestion or drainage.  Adrienne pinnae, external auditory canals, TM pearly gray with normal light reflex bilaterally.Oral mucous membranes pink and moist with no lesions.  Neck: supple with no cervical, subclavicular lymphadenopathy, JVD, palpable thyroid nodules   Lungs: clear to auscultation bilaterally with good excursion.  CV: regular rate and rhythm.  Abdomen soft, non-distended, non-tender with normal bowel sounds. No hepatosplenomegaly, no masses palpated  Skin: no lesions. Warm, dry   Psychiatric: appropriate mood and affect       Assessment and Plan   The following treatment plan was discussed     1. Wellness examination  Doing " well, recent labs reviewed and all stable.       Return in about 1 year (around 10/18/2019).    Health Maintenance      Health Maintenance Due   Topic Date Due   • Annual Wellness Visit  09/02/2018       Daron Correia MD  Internal Medicine  [facility]

## 2018-10-18 NOTE — ASSESSMENT & PLAN NOTE
Danielle is here for her annual exam. She is feeling well and just had her blood work done recently. Reviewed and all stable. Danielle is feeling well and has no acute complaints.

## 2018-11-25 DIAGNOSIS — E78.1 HYPERTRIGLYCERIDEMIA: ICD-10-CM

## 2018-11-28 RX ORDER — SIMVASTATIN 10 MG
TABLET ORAL
Qty: 90 TAB | Refills: 1 | Status: SHIPPED | OUTPATIENT
Start: 2018-11-28 | End: 2019-11-21

## 2018-11-28 NOTE — TELEPHONE ENCOUNTER
Was the patient seen in the last year in this department? Yes    Does patient have an active prescription for medications requested? No     Received Request Via: Pharmacy      Pt met protocol?: Yes    OV 10/18       Lab Results  Component Value Date/Time   CHOLSTRLTOT 173 10/11/2018 0832   TRIGLYCERIDE 107 10/11/2018 0832   HDL 53 10/11/2018 0832   LDL 99 10/11/2018 0832

## 2018-11-28 NOTE — TELEPHONE ENCOUNTER
Pt has had OV within the 12 month protocol and lipid panel is current. 12 month supply sent to pharmacy.   Lab Results   Component Value Date/Time    CHOLSTRLTOT 173 10/11/2018 08:32 AM    LDL 99 10/11/2018 08:32 AM    HDL 53 10/11/2018 08:32 AM    TRIGLYCERIDE 107 10/11/2018 08:32 AM       Lab Results   Component Value Date/Time    SODIUM 142 10/10/2018 10:38 AM    SODIUM 141 10/10/2018 10:38 AM    POTASSIUM 4.0 10/10/2018 10:38 AM    POTASSIUM 4.1 10/10/2018 10:38 AM    CHLORIDE 107 10/10/2018 10:38 AM    CHLORIDE 107 10/10/2018 10:38 AM    CO2 24 10/10/2018 10:38 AM    CO2 26 10/10/2018 10:38 AM    GLUCOSE 67 10/10/2018 10:38 AM    GLUCOSE 65 10/10/2018 10:38 AM    BUN 20 10/10/2018 10:38 AM    BUN 20 10/10/2018 10:38 AM    CREATININE 1.18 10/10/2018 10:38 AM    CREATININE 1.20 10/10/2018 10:38 AM     Lab Results   Component Value Date/Time    ALKPHOSPHAT 51 10/10/2018 10:38 AM    ASTSGOT 22 10/10/2018 10:38 AM    ALTSGPT 18 10/10/2018 10:38 AM    TBILIRUBIN 0.9 10/10/2018 10:38 AM

## 2018-12-05 NOTE — TELEPHONE ENCOUNTER
Was the patient seen in the last year in this department? Yes 04/04/2017    Does patient have an active prescription for medications requested? No     Received Request Via: Patient      **pt is having a severe gout flare up in ankle and states it is very painful   no guarding/nontender/no rebound tenderness/no rigidity/soft nontender/soft soft/nontender no guarding/soft/nontender/no rebound tenderness/no rigidity

## 2018-12-09 DIAGNOSIS — I10 ESSENTIAL HYPERTENSION: ICD-10-CM

## 2018-12-10 RX ORDER — LOSARTAN POTASSIUM 100 MG/1
TABLET ORAL
Qty: 90 TAB | Refills: 1 | Status: SHIPPED | OUTPATIENT
Start: 2018-12-10 | End: 2019-06-04 | Stop reason: SDUPTHER

## 2018-12-10 RX ORDER — LEVOTHYROXINE SODIUM 0.05 MG/1
50 TABLET ORAL
Qty: 90 TAB | Refills: 1 | Status: SHIPPED | OUTPATIENT
Start: 2018-12-10 | End: 2019-03-02 | Stop reason: SDUPTHER

## 2018-12-11 NOTE — TELEPHONE ENCOUNTER
Patient was last seen by PCP 10/18. Last TSH read 3.68 (10/18). Will refill for 6 months.  Lab Results   Component Value Date/Time    SODIUM 142 10/10/2018 10:38 AM    SODIUM 141 10/10/2018 10:38 AM    POTASSIUM 4.0 10/10/2018 10:38 AM    POTASSIUM 4.1 10/10/2018 10:38 AM    CHLORIDE 107 10/10/2018 10:38 AM    CHLORIDE 107 10/10/2018 10:38 AM    CO2 24 10/10/2018 10:38 AM    CO2 26 10/10/2018 10:38 AM    GLUCOSE 67 10/10/2018 10:38 AM    GLUCOSE 65 10/10/2018 10:38 AM    BUN 20 10/10/2018 10:38 AM    BUN 20 10/10/2018 10:38 AM    CREATININE 1.18 10/10/2018 10:38 AM    CREATININE 1.20 10/10/2018 10:38 AM

## 2019-01-24 ENCOUNTER — HOSPITAL ENCOUNTER (OUTPATIENT)
Dept: LAB | Facility: MEDICAL CENTER | Age: 79
End: 2019-01-24
Attending: INTERNAL MEDICINE
Payer: MEDICARE

## 2019-01-24 DIAGNOSIS — E83.52 HYPERCALCEMIA: ICD-10-CM

## 2019-01-24 DIAGNOSIS — N18.30 CKD (CHRONIC KIDNEY DISEASE), STAGE III (HCC): ICD-10-CM

## 2019-01-24 LAB
ANION GAP SERPL CALC-SCNC: 9 MMOL/L (ref 0–11.9)
BUN SERPL-MCNC: 19 MG/DL (ref 8–22)
CALCIUM SERPL-MCNC: 10.2 MG/DL (ref 8.5–10.5)
CHLORIDE SERPL-SCNC: 106 MMOL/L (ref 96–112)
CO2 SERPL-SCNC: 26 MMOL/L (ref 20–33)
CREAT SERPL-MCNC: 1.2 MG/DL (ref 0.5–1.4)
GLUCOSE SERPL-MCNC: 99 MG/DL (ref 65–99)
POTASSIUM SERPL-SCNC: 3.9 MMOL/L (ref 3.6–5.5)
SODIUM SERPL-SCNC: 141 MMOL/L (ref 135–145)

## 2019-01-24 PROCEDURE — 36415 COLL VENOUS BLD VENIPUNCTURE: CPT

## 2019-01-24 PROCEDURE — 80048 BASIC METABOLIC PNL TOTAL CA: CPT

## 2019-01-30 ENCOUNTER — OFFICE VISIT (OUTPATIENT)
Dept: NEPHROLOGY | Facility: MEDICAL CENTER | Age: 79
End: 2019-01-30
Payer: MEDICARE

## 2019-01-30 VITALS
SYSTOLIC BLOOD PRESSURE: 120 MMHG | DIASTOLIC BLOOD PRESSURE: 64 MMHG | OXYGEN SATURATION: 98 % | RESPIRATION RATE: 14 BRPM | HEART RATE: 57 BPM | TEMPERATURE: 97.8 F | WEIGHT: 128 LBS | BODY MASS INDEX: 24.17 KG/M2 | HEIGHT: 61 IN

## 2019-01-30 DIAGNOSIS — I10 ESSENTIAL HYPERTENSION: ICD-10-CM

## 2019-01-30 DIAGNOSIS — N18.30 CKD (CHRONIC KIDNEY DISEASE), STAGE III (HCC): ICD-10-CM

## 2019-01-30 DIAGNOSIS — E55.9 VITAMIN D DEFICIENCY: ICD-10-CM

## 2019-01-30 DIAGNOSIS — M10.9 GOUT OF BOTH FEET: ICD-10-CM

## 2019-01-30 PROCEDURE — 99214 OFFICE O/P EST MOD 30 MIN: CPT | Performed by: INTERNAL MEDICINE

## 2019-01-30 ASSESSMENT — ENCOUNTER SYMPTOMS
CHILLS: 0
WHEEZING: 0
ABDOMINAL PAIN: 0
HYPERTENSION: 1
ORTHOPNEA: 0
PALPITATIONS: 0
DIZZINESS: 0
FLANK PAIN: 0
BACK PAIN: 1
COUGH: 0
WEIGHT LOSS: 0
SENSORY CHANGE: 0
MYALGIAS: 0
FOCAL WEAKNESS: 0
EYES NEGATIVE: 1
RESPIRATORY NEGATIVE: 1
HEMOPTYSIS: 0
VOMITING: 0
DIARRHEA: 0
CONSTITUTIONAL NEGATIVE: 1
SORE THROAT: 0
NAUSEA: 0
SHORTNESS OF BREATH: 0
CARDIOVASCULAR NEGATIVE: 1
FEVER: 0

## 2019-01-30 NOTE — PROGRESS NOTES
"Subjective:      Danielle Jon is a 78 y.o. female who presents with Follow-Up and Chronic Kidney Disease            Chronic Kidney Disease   Pertinent negatives include no abdominal pain, chest pain, chills, congestion, coughing, fever, myalgias, nausea, rash, sore throat or vomiting.   Hypertension   Pertinent negatives include no chest pain, malaise/fatigue, orthopnea, palpitations or shortness of breath.     Danielle is coming today for f/u of HTN, CKD III  Doing well, has no complaints,   No difficulties to urinate  No dysuria/hematuria  HTN: BP well controlled   CKD III -creat level stable at baseline 1.2    Review of Systems   Constitutional: Negative.  Negative for chills, fever, malaise/fatigue and weight loss.   HENT: Negative.  Negative for congestion, nosebleeds and sore throat.    Eyes: Negative.    Respiratory: Negative.  Negative for cough, hemoptysis, shortness of breath and wheezing.    Cardiovascular: Negative.  Negative for chest pain, palpitations, orthopnea and leg swelling.   Gastrointestinal: Negative for abdominal pain, diarrhea, nausea and vomiting.   Genitourinary: Negative.  Negative for dysuria, flank pain, frequency, hematuria and urgency.   Musculoskeletal: Positive for back pain. Negative for myalgias.   Skin: Negative.  Negative for itching and rash.   Neurological: Negative for dizziness, sensory change and focal weakness.   All other systems reviewed and are negative.         Objective:     /64 (BP Location: Right arm, Patient Position: Sitting, BP Cuff Size: Adult)   Pulse (!) 57   Temp 36.6 °C (97.8 °F) (Temporal)   Resp 14   Ht 1.549 m (5' 1\")   Wt 58.1 kg (128 lb)   SpO2 98%   BMI 24.19 kg/m²      Physical Exam   Constitutional: She is oriented to person, place, and time. She appears well-developed and well-nourished.   HENT:   Head: Normocephalic and atraumatic.   Nose: Nose normal.   Mouth/Throat: Oropharynx is clear and moist.   Eyes: Pupils are equal, round, " and reactive to light. Conjunctivae and EOM are normal.   Neck: Normal range of motion. Neck supple.   Cardiovascular: Normal rate and regular rhythm.  Exam reveals no gallop and no friction rub.    No murmur heard.  Pulmonary/Chest: Effort normal and breath sounds normal. No respiratory distress. She has no wheezes. She has no rales.   Abdominal: Soft. Bowel sounds are normal. She exhibits no distension.   Musculoskeletal: She exhibits no edema.   Neurological: She is alert and oriented to person, place, and time. No cranial nerve deficit. Coordination normal.   Skin: Skin is dry.   Nursing note and vitals reviewed.            Laboratory results reviewed: d/w Pt  Lab Results   Component Value Date/Time    CREATININE 1.20 01/24/2019 09:09 AM    POTASSIUM 3.9 01/24/2019 09:09 AM       Assessment/Plan:     1. CKD (chronic kidney disease), stage III      Creatinine level stable- to monitor      2. Essential hypertension      BP well controlled    3. Vit D def with elevated PTH - vit D 1000 units daily    4. Anemia : Hb level WNL  5. Stable  Recs:              Continue current medications             F/u in 4 months with BMP , PTH , vit D, uric acid

## 2019-02-25 RX ORDER — METOPROLOL SUCCINATE 50 MG/1
TABLET, EXTENDED RELEASE ORAL
Qty: 90 TAB | Refills: 1 | Status: SHIPPED | OUTPATIENT
Start: 2019-02-25 | End: 2019-03-04

## 2019-02-25 NOTE — TELEPHONE ENCOUNTER
Was the patient seen in the last year in this department? Yes    Does patient have an active prescription for medications requested? No     Received Request Via: Pharmacy      Pt met protocol?: Yes, OV 10/18   BP Readings from Last 1 Encounters:   01/30/19 120/64

## 2019-02-25 NOTE — TELEPHONE ENCOUNTER
Refill X 6 months, sent to pharmacy.Pt. Seen in the last 6 months per protocol.   Lab Results   Component Value Date/Time    SODIUM 141 01/24/2019 09:09 AM    POTASSIUM 3.9 01/24/2019 09:09 AM    CHLORIDE 106 01/24/2019 09:09 AM    CO2 26 01/24/2019 09:09 AM    GLUCOSE 99 01/24/2019 09:09 AM    BUN 19 01/24/2019 09:09 AM    CREATININE 1.20 01/24/2019 09:09 AM

## 2019-03-04 RX ORDER — LEVOTHYROXINE SODIUM 0.05 MG/1
TABLET ORAL
Qty: 90 TAB | Refills: 2 | Status: SHIPPED | OUTPATIENT
Start: 2019-03-04 | End: 2020-01-22 | Stop reason: SDUPTHER

## 2019-03-04 RX ORDER — METOPROLOL SUCCINATE 50 MG/1
TABLET, EXTENDED RELEASE ORAL
Qty: 90 TAB | Refills: 1 | Status: SHIPPED | OUTPATIENT
Start: 2019-03-04 | End: 2019-09-09

## 2019-03-04 RX ORDER — ALLOPURINOL 100 MG/1
TABLET ORAL
Qty: 90 TAB | Refills: 2 | Status: SHIPPED | OUTPATIENT
Start: 2019-03-04 | End: 2019-11-21

## 2019-03-04 NOTE — TELEPHONE ENCOUNTER
Patient was last seen by PCP 10/18 . Last TSH read 3.68 (10/18). Will refill for 9 months for gout meds and thyroid meds. 6 months on BP meds.     Lab Results   Component Value Date/Time    SODIUM 141 01/24/2019 09:09 AM    POTASSIUM 3.9 01/24/2019 09:09 AM    CHLORIDE 106 01/24/2019 09:09 AM    CO2 26 01/24/2019 09:09 AM    GLUCOSE 99 01/24/2019 09:09 AM    BUN 19 01/24/2019 09:09 AM    CREATININE 1.20 01/24/2019 09:09 AM

## 2019-05-22 ENCOUNTER — HOSPITAL ENCOUNTER (OUTPATIENT)
Dept: LAB | Facility: MEDICAL CENTER | Age: 79
End: 2019-05-22
Attending: INTERNAL MEDICINE
Payer: MEDICARE

## 2019-05-22 DIAGNOSIS — N18.30 CKD (CHRONIC KIDNEY DISEASE), STAGE III (HCC): ICD-10-CM

## 2019-05-22 DIAGNOSIS — E55.9 VITAMIN D DEFICIENCY: ICD-10-CM

## 2019-05-22 LAB
25(OH)D3 SERPL-MCNC: 27 NG/ML (ref 30–100)
ANION GAP SERPL CALC-SCNC: 8 MMOL/L (ref 0–11.9)
BUN SERPL-MCNC: 24 MG/DL (ref 8–22)
CALCIUM SERPL-MCNC: 9.7 MG/DL (ref 8.5–10.5)
CHLORIDE SERPL-SCNC: 109 MMOL/L (ref 96–112)
CO2 SERPL-SCNC: 25 MMOL/L (ref 20–33)
CREAT SERPL-MCNC: 1.23 MG/DL (ref 0.5–1.4)
GLUCOSE SERPL-MCNC: 107 MG/DL (ref 65–99)
POTASSIUM SERPL-SCNC: 4.2 MMOL/L (ref 3.6–5.5)
PTH-INTACT SERPL-MCNC: 117 PG/ML (ref 14–72)
SODIUM SERPL-SCNC: 142 MMOL/L (ref 135–145)
URATE SERPL-MCNC: 4.5 MG/DL (ref 1.9–8.2)

## 2019-05-22 PROCEDURE — 84550 ASSAY OF BLOOD/URIC ACID: CPT

## 2019-05-22 PROCEDURE — 36415 COLL VENOUS BLD VENIPUNCTURE: CPT

## 2019-05-22 PROCEDURE — 82306 VITAMIN D 25 HYDROXY: CPT

## 2019-05-22 PROCEDURE — 80048 BASIC METABOLIC PNL TOTAL CA: CPT

## 2019-05-22 PROCEDURE — 83970 ASSAY OF PARATHORMONE: CPT

## 2019-05-29 ENCOUNTER — OFFICE VISIT (OUTPATIENT)
Dept: NEPHROLOGY | Facility: MEDICAL CENTER | Age: 79
End: 2019-05-29
Payer: MEDICARE

## 2019-05-29 VITALS
SYSTOLIC BLOOD PRESSURE: 130 MMHG | TEMPERATURE: 98.4 F | BODY MASS INDEX: 24.17 KG/M2 | HEIGHT: 61 IN | RESPIRATION RATE: 14 BRPM | HEART RATE: 66 BPM | DIASTOLIC BLOOD PRESSURE: 76 MMHG | OXYGEN SATURATION: 98 % | WEIGHT: 128 LBS

## 2019-05-29 DIAGNOSIS — D64.9 ANEMIA, UNSPECIFIED TYPE: ICD-10-CM

## 2019-05-29 DIAGNOSIS — I10 ESSENTIAL HYPERTENSION: ICD-10-CM

## 2019-05-29 DIAGNOSIS — E55.9 VITAMIN D DEFICIENCY: ICD-10-CM

## 2019-05-29 DIAGNOSIS — E21.1 HYPERPARATHYROIDISM DUE TO VITAMIN D DEFICIENCY (HCC): ICD-10-CM

## 2019-05-29 DIAGNOSIS — N18.30 CKD (CHRONIC KIDNEY DISEASE), STAGE III (HCC): ICD-10-CM

## 2019-05-29 DIAGNOSIS — M10.9 GOUT OF BOTH FEET: ICD-10-CM

## 2019-05-29 PROCEDURE — 99214 OFFICE O/P EST MOD 30 MIN: CPT | Performed by: INTERNAL MEDICINE

## 2019-05-29 ASSESSMENT — ENCOUNTER SYMPTOMS
PALPITATIONS: 0
SORE THROAT: 0
VOMITING: 0
COUGH: 0
ORTHOPNEA: 0
WHEEZING: 0
NAUSEA: 0
CHILLS: 0
HEMOPTYSIS: 0
ABDOMINAL PAIN: 0
HYPERTENSION: 1
FEVER: 0
SINUS PAIN: 0
DIARRHEA: 0
SHORTNESS OF BREATH: 0
WEIGHT LOSS: 0
EYES NEGATIVE: 1

## 2019-05-29 NOTE — PROGRESS NOTES
Subjective:      Danielle Jon is a 78 y.o. female who presents with Chronic Kidney Disease            Chronic Kidney Disease   Pertinent negatives include no abdominal pain, chest pain, chills, congestion, coughing, fever, nausea, sore throat or vomiting.   Hypertension   Pertinent negatives include no chest pain, malaise/fatigue, orthopnea, palpitations or shortness of breath.     Danielle is coming today for HTN, CKD III f/u  Doing well, no complaints  No difficulties to urinate  No dysuria/hematuria/flank pain  HTN: BP well controlled -compliant to low Na diet,   CKD III -stable at baseline    Review of Systems   Constitutional: Negative for chills, fever, malaise/fatigue and weight loss.   HENT: Negative for congestion, hearing loss, sinus pain and sore throat.    Eyes: Negative.    Respiratory: Negative for cough, hemoptysis, shortness of breath and wheezing.    Cardiovascular: Negative for chest pain, palpitations, orthopnea and leg swelling.   Gastrointestinal: Negative for abdominal pain, diarrhea, nausea and vomiting.   Genitourinary: Negative for dysuria, frequency, hematuria and urgency.   Skin: Negative.    All other systems reviewed and are negative.    Past Medical History:   Diagnosis Date   • CKD (chronic kidney disease), stage III (HCC) 11/29/2017   • Essential hypertension 2/19/2016   • Gout of both feet 6/6/2017   • Hypercalcemia 12/14/2017   • Hypertriglyceridemia 4/4/2017   • Hypothyroidism 2/19/2016       Family History   Problem Relation Age of Onset   • Hypertension Mother    • Heart Disease Mother         chf   • Lung Disease Father    • Hypertension Sister    • Hyperlipidemia Sister    • Hypertension Brother        Social History     Social History   • Marital status:      Spouse name: N/A   • Number of children: N/A   • Years of education: N/A     Social History Main Topics   • Smoking status: Never Smoker   • Smokeless tobacco: Never Used   • Alcohol use 8.4 oz/week     14  "Glasses of wine per week   • Drug use: No   • Sexual activity: Yes     Partners: Male     Other Topics Concern   • Not on file     Social History Narrative    Retired from [employer]           Objective:     Pulse 66   Temp 36.9 °C (98.4 °F)   Ht 1.549 m (5' 1\")   Wt 58.1 kg (128 lb)   SpO2 98%   BMI 24.19 kg/m²      Physical Exam   Constitutional: She is oriented to person, place, and time. She appears well-developed and well-nourished. No distress.   HENT:   Head: Normocephalic and atraumatic.   Nose: Nose normal.   Mouth/Throat: Oropharynx is clear and moist.   Eyes: Pupils are equal, round, and reactive to light. EOM are normal.   Neck: Normal range of motion. Neck supple. No thyromegaly present.   Cardiovascular: Normal rate, regular rhythm and intact distal pulses.  Exam reveals no gallop and no friction rub.    No murmur heard.  Pulmonary/Chest: Effort normal and breath sounds normal. No respiratory distress. She has no wheezes. She has no rales.   Abdominal: Soft. Bowel sounds are normal. She exhibits no distension and no mass. There is no tenderness. There is no guarding.   Musculoskeletal: She exhibits no edema.   Neurological: She is alert and oriented to person, place, and time. No cranial nerve deficit. Coordination normal.   Skin: Skin is warm. No rash noted. She is not diaphoretic. No erythema. No pallor.             Laboratory results reviewed: d/w Pt  Lab Results   Component Value Date/Time    CREATININE 1.23 05/22/2019 01:47 PM    POTASSIUM 4.2 05/22/2019 01:47 PM       Assessment/Plan:     1. CKD (chronic kidney disease), stage III      Stable creat level -to monitor      2. Essential hypertension      BP well controlled -continue current treatment    3. Vit D def      Low vit D level -continue supplementation      elevated PTH -worse -continue vit D    4. Anemia : Hb level WNL    5. Gout; uric acid well controlled -on allopurinol      Recs:              Continue current medications, low Na " diet, monitor BP             F/u in 6 months with BMP , vit D, PTH

## 2019-06-04 DIAGNOSIS — I10 ESSENTIAL HYPERTENSION: ICD-10-CM

## 2019-06-05 RX ORDER — LOSARTAN POTASSIUM 100 MG/1
TABLET ORAL
Qty: 90 TAB | Refills: 1 | Status: SHIPPED | OUTPATIENT
Start: 2019-06-05 | End: 2019-12-03 | Stop reason: SDUPTHER

## 2019-06-05 NOTE — TELEPHONE ENCOUNTER
Was the patient seen in the last year in this department? Yes    Does patient have an active prescription for medications requested? No     Received Request Via: Pharmacy      Pt met protocol?: Yes    OV 10/18     BP Readings from Last 1 Encounters:   05/29/19 130/76

## 2019-06-05 NOTE — TELEPHONE ENCOUNTER
Refill X 6 months, sent to pharmacy.Pt. Seen in the last 6 months per protocol.   Lab Results   Component Value Date/Time    SODIUM 142 05/22/2019 01:47 PM    POTASSIUM 4.2 05/22/2019 01:47 PM    CHLORIDE 109 05/22/2019 01:47 PM    CO2 25 05/22/2019 01:47 PM    GLUCOSE 107 (H) 05/22/2019 01:47 PM    BUN 24 (H) 05/22/2019 01:47 PM    CREATININE 1.23 05/22/2019 01:47 PM

## 2019-09-09 RX ORDER — METOPROLOL SUCCINATE 50 MG/1
TABLET, EXTENDED RELEASE ORAL
Qty: 90 TAB | Refills: 1 | Status: SHIPPED | OUTPATIENT
Start: 2019-09-09 | End: 2019-12-02

## 2019-09-09 RX ORDER — METOPROLOL SUCCINATE 50 MG/1
TABLET, EXTENDED RELEASE ORAL
Qty: 90 TAB | Refills: 1 | OUTPATIENT
Start: 2019-09-09

## 2019-10-11 ENCOUNTER — OFFICE VISIT (OUTPATIENT)
Dept: URGENT CARE | Facility: PHYSICIAN GROUP | Age: 79
End: 2019-10-11
Payer: MEDICARE

## 2019-10-11 VITALS
OXYGEN SATURATION: 98 % | DIASTOLIC BLOOD PRESSURE: 84 MMHG | TEMPERATURE: 98.4 F | RESPIRATION RATE: 16 BRPM | SYSTOLIC BLOOD PRESSURE: 162 MMHG | HEART RATE: 58 BPM

## 2019-10-11 DIAGNOSIS — K52.9 GASTROENTERITIS: ICD-10-CM

## 2019-10-11 DIAGNOSIS — R03.0 ELEVATED BLOOD PRESSURE READING: ICD-10-CM

## 2019-10-11 LAB
APPEARANCE UR: CLEAR
BILIRUB UR STRIP-MCNC: NEGATIVE MG/DL
COLOR UR AUTO: YELLOW
GLUCOSE UR STRIP.AUTO-MCNC: NEGATIVE MG/DL
KETONES UR STRIP.AUTO-MCNC: 40 MG/DL
LEUKOCYTE ESTERASE UR QL STRIP.AUTO: NORMAL
NITRITE UR QL STRIP.AUTO: NEGATIVE
PH UR STRIP.AUTO: 6.5 [PH] (ref 5–8)
PROT UR QL STRIP: NORMAL MG/DL
RBC UR QL AUTO: NEGATIVE
SP GR UR STRIP.AUTO: 1.02
UROBILINOGEN UR STRIP-MCNC: 0.2 MG/DL

## 2019-10-11 PROCEDURE — 93000 ELECTROCARDIOGRAM COMPLETE: CPT | Performed by: PHYSICIAN ASSISTANT

## 2019-10-11 PROCEDURE — 99214 OFFICE O/P EST MOD 30 MIN: CPT | Performed by: PHYSICIAN ASSISTANT

## 2019-10-11 PROCEDURE — 81002 URINALYSIS NONAUTO W/O SCOPE: CPT | Performed by: PHYSICIAN ASSISTANT

## 2019-10-11 RX ORDER — ONDANSETRON 4 MG/1
4 TABLET, FILM COATED ORAL EVERY 4 HOURS PRN
Qty: 30 TAB | Refills: 0 | Status: SHIPPED | OUTPATIENT
Start: 2019-10-11 | End: 2019-10-22

## 2019-10-11 RX ORDER — ONDANSETRON 4 MG/1
4 TABLET, ORALLY DISINTEGRATING ORAL ONCE
Status: COMPLETED | OUTPATIENT
Start: 2019-10-11 | End: 2019-10-11

## 2019-10-11 RX ADMIN — ONDANSETRON 4 MG: 4 TABLET, ORALLY DISINTEGRATING ORAL at 17:00

## 2019-10-12 ASSESSMENT — ENCOUNTER SYMPTOMS
FEVER: 0
NECK PAIN: 1
DIARRHEA: 0
VOMITING: 1
NUMBER OF EPISODES OF EMESIS TODAY: 1
CHANGE IN BOWEL HABIT: 1
NAUSEA: 1
SHORTNESS OF BREATH: 0
ABDOMINAL PAIN: 0
CHILLS: 0
DIZZINESS: 0

## 2019-10-12 NOTE — PROGRESS NOTES
Subjective:      Danielle Jon is a 79 y.o. female who presents with Emesis (diarrhea yesterday,pain in arm radiates up to neck on right side today)        Patient is accompanied by her .     Emesis   This is a new problem. The current episode started yesterday. The problem occurs intermittently. The problem has been waxing and waning. Associated symptoms include a change in bowel habit, nausea, neck pain and vomiting. Pertinent negatives include no abdominal pain, chest pain, chills, congestion, fever or rash. The symptoms are aggravated by drinking and eating. She has tried nothing for the symptoms.     Patient presents to urgent care reporting one episode of diarrhea last night and today. She also reports intermittent nausea and vomiting today. Today she also woke up with right sided neck pain, radiating down the right arm. Pain is described as dull and tight, worsened with neck movements. No fevers, chills, body aches, chest pain, palpitations, SOB, abdominal pain, change in vision, headaches, lower extremity edema, or dizziness. She denies personal or family history of cardiac disease. No history of smoking. She does have a history of HTN, usually well controlled on metoprolol and losartan. No history of diabetes. No history of abdominal surgeries. She denies recent travel, suspect food intake, recent use of antibiotics, or sick contacts.     Review of Systems   Constitutional: Negative for chills and fever.   HENT: Negative for congestion.    Respiratory: Negative for shortness of breath.    Cardiovascular: Negative for chest pain.   Gastrointestinal: Positive for change in bowel habit, nausea and vomiting. Negative for abdominal pain and diarrhea.   Genitourinary: Negative.    Musculoskeletal: Positive for neck pain.   Skin: Negative for rash.   Neurological: Negative for dizziness.   All other systems reviewed and are negative.        Objective:     BP (!) 162/84   Pulse (!) 58   Temp 36.9 °C (98.4  °F) (Temporal)   Resp 16   SpO2 98%      Physical Exam   Constitutional: She is oriented to person, place, and time. She appears well-developed and well-nourished. No distress.   HENT:   Head: Normocephalic and atraumatic.   Eyes: Pupils are equal, round, and reactive to light. Conjunctivae are normal. Right eye exhibits no discharge. Left eye exhibits no discharge.   Neck: Normal range of motion. Muscular tenderness present. No spinous process tenderness present. No neck rigidity. Normal range of motion present.       + TTP over right trapezius. Pain with ROM exercises.    Cardiovascular: Normal rate, regular rhythm and normal heart sounds.   No murmur heard.  Pulmonary/Chest: Effort normal. No stridor. No respiratory distress. She has no wheezes.   Abdominal: Soft. Normal appearance. She exhibits no distension. There is no tenderness. There is no rebound, no guarding, no CVA tenderness, no tenderness at McBurney's point and negative Bloom's sign.   Musculoskeletal: Normal range of motion.   Neurological: She is alert and oriented to person, place, and time.   Skin: Skin is warm and dry. She is not diaphoretic.   Psychiatric: She has a normal mood and affect. Her behavior is normal.   Nursing note and vitals reviewed.         PMH:  has a past medical history of CKD (chronic kidney disease), stage III (HCC) (11/29/2017), Essential hypertension (2/19/2016), Gout of both feet (6/6/2017), Hypercalcemia (12/14/2017), Hypertriglyceridemia (4/4/2017), and Hypothyroidism (2/19/2016).  MEDS:   Current Outpatient Medications:   •  ondansetron (ZOFRAN) 4 MG Tab tablet, Take 1 Tab by mouth every four hours as needed for Nausea/Vomiting., Disp: 30 Tab, Rfl: 0  •  metoprolol SR (TOPROL XL) 50 MG TABLET SR 24 HR, TAKE ONE TABLET BY MOUTH EVERY DAY, Disp: 90 Tab, Rfl: 1  •  losartan (COZAAR) 100 MG Tab, TAKE ONE TABLET BY MOUTH EVERY DAY, Disp: 90 Tab, Rfl: 1  •  allopurinol (ZYLOPRIM) 100 MG Tab, TAKE ONE TABLET BY MOUTH EVERY  DAY, Disp: 90 Tab, Rfl: 2  •  levothyroxine (SYNTHROID) 50 MCG Tab, TAKE 1 TABLET BY MOUTH EVERY MORNING ON AN EMPTY STOMACH, Disp: 90 Tab, Rfl: 2  •  simvastatin (ZOCOR) 10 MG Tab, TAKE ONE TABLET BY MOUTH EVERY EVENING, Disp: 90 Tab, Rfl: 1  •  Acetaminophen (TYLENOL 8 HOUR PO), Take  by mouth., Disp: , Rfl:   •  Cetirizine HCl 10 MG Cap, Take  by mouth., Disp: , Rfl:   •  cyanocobalamin (VITAMIN B-12) 500 MCG Tab, Take 500 mcg by mouth every day., Disp: , Rfl:   •  aspirin 81 MG tablet, Take 81 mg by mouth every day., Disp: , Rfl:   •  Cholecalciferol (VITAMIN D3) 1000 UNITS Cap, Take 2,000 Units by mouth every day., Disp: , Rfl:   ALLERGIES:   Allergies   Allergen Reactions   • Niacin Rash and Swelling     Rash/itching/swelling     SURGHX:   Past Surgical History:   Procedure Laterality Date   • CATARACT EXTRACTION WITH IOL Bilateral 2014   • EYE SURGERY      lasix    • MAMMOPLASTY REDUCTION       SOCHX:  reports that she has never smoked. She has never used smokeless tobacco. She reports that she drinks about 8.4 oz of alcohol per week. She reports that she does not use drugs.  FH: family history includes Heart Disease in her mother; Hyperlipidemia in her sister; Hypertension in her brother, mother, and sister; Lung Disease in her father.     POCT Urinalysis:   Ref Range & Units 1d ago   POC Color Negative Yellow    POC Appearance Negative Clear    POC Leukocyte Esterase Negative Small    POC Nitrites Negative Negative    POC Urobiligen Negative (0.2) mg/dL 0.2    POC Protein Negative mg/dL Trace    POC Urine PH 5.0 - 8.0 6.5    POC Blood Negative Negative    POC Specific Gravity <1.005 - >1.030 1.025    POC Ketones Negative mg/dL 40    POC Bilirubin Negative mg/dL Negative    POC Glucose Negative mg/dL Negative        Assessment/Plan:     1. Gastroenteritis  - POCT Urinalysis --> small leuk and ketones, otherwise normal   - ondansetron (ZOFRAN ODT) dispertab 4 mg   - Given in clinic with mild relief of nausea    - ondansetron (ZOFRAN) 4 MG Tab tablet; Take 1 Tab by mouth every four hours as needed for Nausea/Vomiting.  Dispense: 30 Tab; Refill: 0    Advised patient symptoms are most likely viral in etiology, recommend supportive care. Increased fluids and rest. Zofran as needed for symptomatic relief. BRAT diet discussed. Call or return to office if symptoms persist or worsen. The patient demonstrated a good understanding and agreed with the treatment plan.    2. Elevated blood pressure reading    - EKG - Clinic Performed --> T wave changes in anterior leads, slightly more pronounced compared to EKG performed 1 year ago.     Discussed EKG findings with patient and her . T wave changes in anterior leads were noted on prior EKG from 1 year ago. Patient has low risk for cardiac disease and isn't having any active chest pain/pressure, SOB, palpitations, or diaphoresis. Encouraged to monitor blood pressure at home, STRICT ED precautions given for develop of cardiac symptoms, or worsening elevated blood pressure readings. The patient and her  demonstrated a good understanding and agreed with the treatment plan.

## 2019-10-17 ENCOUNTER — TELEPHONE (OUTPATIENT)
Dept: MEDICAL GROUP | Facility: PHYSICIAN GROUP | Age: 79
End: 2019-10-17

## 2019-10-17 NOTE — TELEPHONE ENCOUNTER
1. Caller Name: Danielle      Call Back Number: [phone] (home)         Patient approves a detailed voicemail message: N\A    Patient called because she was seen in Urgent care for Neck and arm pain with neausea and diarrhea. She stated they did an EKG and a UA. She was told her BP was high. She has taken her BP several times and it is still running high, she just took her BP at [facility] and it is 190/83. But has also been running 162/84, 168/80's.  Patient had an appointment in Feb to establish with . I moved her appointment up to next Tuesday. Suggested she go to Urgent Care or the ER if she develops any symptoms.

## 2019-10-22 ENCOUNTER — OFFICE VISIT (OUTPATIENT)
Dept: MEDICAL GROUP | Facility: PHYSICIAN GROUP | Age: 79
End: 2019-10-22
Payer: MEDICARE

## 2019-10-22 VITALS
SYSTOLIC BLOOD PRESSURE: 184 MMHG | RESPIRATION RATE: 14 BRPM | OXYGEN SATURATION: 97 % | BODY MASS INDEX: 23.86 KG/M2 | TEMPERATURE: 98.4 F | WEIGHT: 126.4 LBS | HEART RATE: 67 BPM | DIASTOLIC BLOOD PRESSURE: 84 MMHG | HEIGHT: 61 IN

## 2019-10-22 DIAGNOSIS — I49.9 IRREGULAR HEART BEAT: ICD-10-CM

## 2019-10-22 DIAGNOSIS — E83.42 HYPOMAGNESEMIA: ICD-10-CM

## 2019-10-22 DIAGNOSIS — M10.9 GOUT OF BOTH FEET: ICD-10-CM

## 2019-10-22 DIAGNOSIS — H61.22 IMPACTED CERUMEN OF LEFT EAR: ICD-10-CM

## 2019-10-22 DIAGNOSIS — E53.8 VITAMIN B12 DEFICIENCY: ICD-10-CM

## 2019-10-22 DIAGNOSIS — I10 ESSENTIAL HYPERTENSION: ICD-10-CM

## 2019-10-22 DIAGNOSIS — R03.0 ELEVATED BLOOD PRESSURE READING: ICD-10-CM

## 2019-10-22 DIAGNOSIS — Z51.81 MEDICATION MONITORING ENCOUNTER: ICD-10-CM

## 2019-10-22 DIAGNOSIS — Z11.59 ENCOUNTER FOR HEPATITIS C SCREENING TEST FOR LOW RISK PATIENT: ICD-10-CM

## 2019-10-22 DIAGNOSIS — E03.9 HYPOTHYROIDISM, UNSPECIFIED TYPE: ICD-10-CM

## 2019-10-22 DIAGNOSIS — N18.30 CKD (CHRONIC KIDNEY DISEASE), STAGE III (HCC): ICD-10-CM

## 2019-10-22 DIAGNOSIS — Z23 NEED FOR VACCINATION: ICD-10-CM

## 2019-10-22 DIAGNOSIS — H91.8X3 OTHER SPECIFIED HEARING LOSS OF BOTH EARS: ICD-10-CM

## 2019-10-22 DIAGNOSIS — E78.1 HYPERTRIGLYCERIDEMIA: ICD-10-CM

## 2019-10-22 DIAGNOSIS — Z78.9 ALCOHOL USE: ICD-10-CM

## 2019-10-22 DIAGNOSIS — R53.83 OTHER FATIGUE: ICD-10-CM

## 2019-10-22 PROBLEM — H91.90 IMPAIRED HEARING: Status: ACTIVE | Noted: 2019-10-22

## 2019-10-22 PROCEDURE — 90662 IIV NO PRSV INCREASED AG IM: CPT | Performed by: FAMILY MEDICINE

## 2019-10-22 PROCEDURE — G0008 ADMIN INFLUENZA VIRUS VAC: HCPCS | Performed by: FAMILY MEDICINE

## 2019-10-22 PROCEDURE — 99214 OFFICE O/P EST MOD 30 MIN: CPT | Mod: 25 | Performed by: FAMILY MEDICINE

## 2019-10-22 RX ORDER — AMLODIPINE BESYLATE 5 MG/1
5 TABLET ORAL DAILY
Qty: 90 TAB | Refills: 0 | Status: SHIPPED | OUTPATIENT
Start: 2019-10-22 | End: 2019-12-02

## 2019-10-22 SDOH — HEALTH STABILITY: MENTAL HEALTH: HOW MANY STANDARD DRINKS CONTAINING ALCOHOL DO YOU HAVE ON A TYPICAL DAY?: 1 OR 2

## 2019-10-22 SDOH — HEALTH STABILITY: MENTAL HEALTH: HOW OFTEN DO YOU HAVE A DRINK CONTAINING ALCOHOL?: 2-3 TIMES A WEEK

## 2019-10-22 ASSESSMENT — PATIENT HEALTH QUESTIONNAIRE - PHQ9: CLINICAL INTERPRETATION OF PHQ2 SCORE: 0

## 2019-10-22 NOTE — PROGRESS NOTES
Cc: Established care, hypertension, hypothyroidism, gout    Subjective:     Danielle Jon is a 79 y.o. female presenting Retired from [employer].  Lives with her .  Having some hearing concerns.  Walking without walking assistance, mentally and physically.  Independent. No social or domestic concerns.  No hospitalizations in the last year.  She is having some hearing concerns.  At age 33 she did have a mild TIA and then had her blood pressure medication adjusted but otherwise her blood pressure has been well controlled except until recently.  She is a non-smoker with no history of heart attack.  She does drink about 2 glasses of wine a night.  She does have a nephrologist that she sees every 6 months and will be seen in January again for chronic kidney disease stage III.  She was last seen May 29 with Dr. Wilkerson, and during this point her creatinine level was stable and blood pressure at this visit was well controlled and creatinine was 1.23 and vitamin D was a little low and elevated PTH so they had talked about doubling her vitamin D which she is doing and her hemoglobin was within normal limits and her uric acid was well controlled on allopurinol at this time.  October 11, 2019 when she was in urgent care for possible gastroenteritis her blood pressure was 162/84 and they did a EKG where she did have some T wave changes which are slightly more pronounced compared to the EKG performed a year ago.  She has not had any chest pain or problems breathing.  She did get a new blood pressure cuff.  Her blood pressure has been 180s over 80s.  When she initially got here her blood pressure was 184/84 and on recheck her blood pressure is 188/80.    Review of systems:     Constitutional: Negative for fever, chills and positive fatigue.   HENT: Negative for sinus pressure, negative for ear pain or positive bilateral hearing loss  Eyes: Negative for blurriness, negative for double vision  Respiratory: Negative for cough and  shortness of breath, negative for exertional shortness of breath  Cardiovascular: Negative for leg swelling, negative for palpitations, negative for chest pain  Gastrointestinal: Negative for nausea, vomiting, abdominal pain, constipation and diarrhea.  Genitourinary: Negative for dysuria and hematuria.   Neurological: Negative for dizziness, focal weakness and headaches.   Endo/Heme/Allergies: Denies bleeding, bruising, and recurrent allergies.  Psychiatric/Behavioral: Negative for depression and anxiety.        Current Outpatient Medications:   •  amLODIPine (NORVASC) 5 MG Tab, Take 1 Tab by mouth every day., Disp: 90 Tab, Rfl: 0  •  carbamide peroxide (CARBAMOXIDE EAR DROPS) 6.5 % Solution, Place 6 Drops in ear 2 times a day. Administer drops in both ears., Disp: 1 Bottle, Rfl: 1  •  metoprolol SR (TOPROL XL) 50 MG TABLET SR 24 HR, TAKE ONE TABLET BY MOUTH EVERY DAY, Disp: 90 Tab, Rfl: 1  •  losartan (COZAAR) 100 MG Tab, TAKE ONE TABLET BY MOUTH EVERY DAY, Disp: 90 Tab, Rfl: 1  •  allopurinol (ZYLOPRIM) 100 MG Tab, TAKE ONE TABLET BY MOUTH EVERY DAY, Disp: 90 Tab, Rfl: 2  •  levothyroxine (SYNTHROID) 50 MCG Tab, TAKE 1 TABLET BY MOUTH EVERY MORNING ON AN EMPTY STOMACH, Disp: 90 Tab, Rfl: 2  •  simvastatin (ZOCOR) 10 MG Tab, TAKE ONE TABLET BY MOUTH EVERY EVENING, Disp: 90 Tab, Rfl: 1  •  Acetaminophen (TYLENOL 8 HOUR PO), Take  by mouth., Disp: , Rfl:   •  Cetirizine HCl 10 MG Cap, Take  by mouth., Disp: , Rfl:   •  cyanocobalamin (VITAMIN B-12) 500 MCG Tab, Take 500 mcg by mouth every day., Disp: , Rfl:   •  aspirin 81 MG tablet, Take 81 mg by mouth every day., Disp: , Rfl:   •  Cholecalciferol (VITAMIN D3) 1000 UNITS Cap, Take 2,000 Units by mouth every day., Disp: , Rfl:     Allergies, past medical history, past surgical history, family history, social history reviewed and updated    Objective:     Vitals: BP (!) 184/84 (BP Location: Right arm, Patient Position: Sitting, BP Cuff Size: Adult)   Pulse 67    "Temp 36.9 °C (98.4 °F) (Temporal)   Resp 14   Ht 1.549 m (5' 1\")   Wt 57.3 kg (126 lb 6.4 oz)   SpO2 97%   BMI 23.88 kg/m²   General: Alert, pleasant, NAD  HEENT: Normocephalic.  Nontraumatic. EOMI, no icterus or pallor.  Conjunctivae and lids normal. External ears normal. Oropharynx non-erythematous, mucous membranes moist.  Neck supple.  No thyromegaly or masses palpated. No cervical or supraclavicular lymphadenopathy.  Cerumen impaction in the left ear.  Heart: irregular rhythm and regular rate.  S1 and S2 normal.  No murmurs appreciated.  Respiratory: Normal respiratory effort.  Clear to auscultation bilaterally.  Abdomen: Non-distended, soft, non tender in all 4 quadrants.  Skin: Warm, dry, no rashes.  Musculoskeletal: Gait is normal.  Moves all extremities well.  Extremities: No leg edema.  Pedal pulses 2+ symmetric.   Psych:  Affect/mood is normal, judgement is good, memory is intact, grooming is appropriate.    Assessment/Plan:     Diagnoses and all orders for this visit:    Essential hypertension  -     Comp Metabolic Panel; Future  -     amLODIPine (NORVASC) 5 MG Tab; Take 1 Tab by mouth every day.  -     Holter Monitor Study; Future  -     REFERRAL TO CARDIOLOGY    Need for vaccination  -     INFLUENZA VACCINE, HIGH DOSE (65+ ONLY)    Gout of both feet  -     URIC ACID, SERUM    CKD (chronic kidney disease), stage III (HCC)  -     REFERRAL TO CARDIOLOGY    Hypothyroidism, unspecified type  -     TSH WITH REFLEX TO FT4; Future    Hypertriglyceridemia  -     Lipid Profile; Future    Other specified hearing loss of both ears  -     REFERRAL TO AUDIOLOGY    Alcohol use    Medication monitoring encounter  -     CBC WITH DIFFERENTIAL; Future  -     Comp Metabolic Panel; Future  -     TSH WITH REFLEX TO FT4; Future  -     URIC ACID, SERUM    Other fatigue  -     CBC WITH DIFFERENTIAL; Future  -     TSH WITH REFLEX TO FT4; Future    Encounter for hepatitis C screening test for low risk patient  -     " HEPATITIS PANEL ACUTE(4 COMPONENTS); Future    Vitamin B12 deficiency  -     VITAMIN B12; Future    Hypomagnesemia  -     MAGNESIUM; Future    Impacted cerumen of left ear  -     carbamide peroxide (CARBAMOXIDE EAR DROPS) 6.5 % Solution; Place 6 Drops in ear 2 times a day. Administer drops in both ears.    Elevated blood pressure reading  -     REFERRAL TO CARDIOLOGY    Irregular heart beat  -     Holter Monitor Study; Future  -     REFERRAL TO CARDIOLOGY    -We will give her the flu vaccine today.  We will start her on amlodipine 5 mg daily and please check your blood pressure at least every second day sometimes in the morning sometimes in the evening and check the top number systolic and the bottom number diastolic and your heart rate in both arms and please record this on the blood pressure form sheet and patient instruction ideal blood pressure is 120s over 70s and since higher than 130s over 90s we will start treating her with this new blood pressure medication and continue to take losartan 100 mg daily and metoprolol extended release 50 mg daily.  At least 1 week before you see me please get fasting lab work 8 hours of no eating but drink plenty of water.  Referral sent for audiology and cardiology and for Holter monitor and they should call but if you do not hear back in a week please call the number on the sheet of paper.  For her years please to 5 drops twice a day of the eardrops Debrox or carbamide peroxide over-the-counter or prescription in each years at least 1 week before you see me to soften up the earwax and then we could consider ear lavage at your next appointment.  We will work on getting better control of her blood pressure and slowly reducing this.  She is currently asymptomatic but ER precautions given if any chest pain, shortness of breath, passing out, dizziness then please go to the ER call 911 or if you are having any strokelike symptoms which she is aware of such as tingling or numbness  or paralysis then she will call 911 or go to the ER but otherwise we will see her back in 4 weeks to go over her blood pressure logs and her lab work and to recheck her years and see how the new medication is working and if we need to increase it.  She is also taking cherry juice and allopurinol 100 mg and not having any gouty attacks for the last 1 year so we will see what her uric acid levels are.  She will continue to see the nephrologist every 6 months which she will be seen in January.  And she will continue to see the ophthalmologist.  If she is having any symptoms or her blood pressure goes above 200/110 then please go to the ER or call 911.    Return in about 4 weeks (around 11/19/2019), or BP/lab FU/ears/HR.

## 2019-10-22 NOTE — PATIENT INSTRUCTIONS
Diagnoses and all orders for this visit:    Essential hypertension  -     Comp Metabolic Panel; Future  -     amLODIPine (NORVASC) 5 MG Tab; Take 1 Tab by mouth every day.  -     Holter Monitor Study; Future  -     REFERRAL TO CARDIOLOGY    Need for vaccination  -     INFLUENZA VACCINE, HIGH DOSE (65+ ONLY)    Gout of both feet  -     URIC ACID, SERUM    CKD (chronic kidney disease), stage III (HCC)  -     REFERRAL TO CARDIOLOGY    Hypothyroidism, unspecified type  -     TSH WITH REFLEX TO FT4; Future    Hypertriglyceridemia  -     Lipid Profile; Future    Other specified hearing loss of both ears  -     REFERRAL TO AUDIOLOGY    Alcohol use    Medication monitoring encounter  -     CBC WITH DIFFERENTIAL; Future  -     Comp Metabolic Panel; Future  -     TSH WITH REFLEX TO FT4; Future  -     URIC ACID, SERUM    Other fatigue  -     CBC WITH DIFFERENTIAL; Future  -     TSH WITH REFLEX TO FT4; Future    Encounter for hepatitis C screening test for low risk patient  -     HEPATITIS PANEL ACUTE(4 COMPONENTS); Future    Vitamin B12 deficiency  -     VITAMIN B12; Future    Hypomagnesemia  -     MAGNESIUM; Future    Impacted cerumen of left ear  -     carbamide peroxide (CARBAMOXIDE EAR DROPS) 6.5 % Solution; Place 6 Drops in ear 2 times a day. Administer drops in both ears.    Elevated blood pressure reading  -     REFERRAL TO CARDIOLOGY    Irregular heart beat  -     Holter Monitor Study; Future  -     REFERRAL TO CARDIOLOGY    -We will give her the flu vaccine today.  We will start her on amlodipine 5 mg daily and please check your blood pressure at least every second day sometimes in the morning sometimes in the evening and check the top number systolic and the bottom number diastolic and your heart rate in both arms and please record this on the blood pressure form sheet and patient instruction ideal blood pressure is 120s over 70s and since higher than 130s over 90s we will start treating her with this new blood  pressure medication and continue to take losartan 100 mg daily and metoprolol extended release 50 mg daily.  At least 1 week before you see me please get fasting lab work 8 hours of no eating but drink plenty of water.  Referral sent for audiology and cardiology and for Holter monitor and they should call but if you do not hear back in a week please call the number on the sheet of paper.  For her years please to 5 drops twice a day of the eardrops Debrox or carbamide peroxide over-the-counter or prescription in each years at least 1 week before you see me to soften up the earwax and then we could consider ear lavage at your next appointment.  We will work on getting better control of her blood pressure and slowly reducing this.  She is currently asymptomatic but ER precautions given if any chest pain, shortness of breath, passing out, dizziness then please go to the ER call 911 or if you are having any strokelike symptoms which she is aware of such as tingling or numbness or paralysis then she will call 911 or go to the ER but otherwise we will see her back in 4 weeks to go over her blood pressure logs and her lab work and to recheck her years and see how the new medication is working and if we need to increase it.  She is also taking cherry juice and allopurinol 100 mg and not having any gouty attacks for the last 1 year so we will see what her uric acid levels are.  She will continue to see the nephrologist every 6 months which she will be seen in January.  And she will continue to see the ophthalmologist.  If she is having any symptoms or her blood pressure goes above 200/110 then please go to the ER or call 911.    Return in about 4 weeks (around 11/19/2019), or BP/lab FU/ears/HR.    Introduction  Your blood pressure on this visit to the emergency department or clinic is elevated. This does not necessarily mean you have high blood pressure (hypertension), but it does mean that your blood pressure needs to be  rechecked. Many times your blood pressure can increase due to illness, pain, anxiety, or other factors.  We recommend that you get a series of blood pressure readings done over a period of 5 days. It is best to get a reading in the morning and one in the evening. You should make sure to sit and relax for 1-5 minutes before the reading is taken. Write the readings down and make a follow-up appointment with your health care provider to discuss the results. If there is not a free clinic or a drug store with a blood-pressure-taking machine near you, you can purchase blood-pressure-taking equipment from a drug store. Having one in the home allows you the convenience of taking your blood pressure while you are home and relaxed.  BLOOD PRESSURE LOG   Date: _______________________  · a.m. _____________________  · p.m. _____________________  Date: _______________________  · a.m. _____________________  · p.m. _____________________  Date: _______________________  · a.m. _____________________  · p.m. _____________________  Date: _______________________  · a.m. _____________________  · p.m. _____________________  Date: _______________________  · a.m. _____________________  · p.m. _____________________  This information is not intended to replace advice given to you by your health care provider. Make sure you discuss any questions you have with your health care provider.

## 2019-11-14 ENCOUNTER — HOSPITAL ENCOUNTER (OUTPATIENT)
Dept: LAB | Facility: MEDICAL CENTER | Age: 79
End: 2019-11-14
Attending: FAMILY MEDICINE
Payer: MEDICARE

## 2019-11-14 DIAGNOSIS — E03.9 HYPOTHYROIDISM, UNSPECIFIED TYPE: ICD-10-CM

## 2019-11-14 DIAGNOSIS — Z11.59 ENCOUNTER FOR HEPATITIS C SCREENING TEST FOR LOW RISK PATIENT: ICD-10-CM

## 2019-11-14 DIAGNOSIS — R53.83 OTHER FATIGUE: ICD-10-CM

## 2019-11-14 DIAGNOSIS — E78.1 HYPERTRIGLYCERIDEMIA: ICD-10-CM

## 2019-11-14 DIAGNOSIS — E53.8 VITAMIN B12 DEFICIENCY: ICD-10-CM

## 2019-11-14 DIAGNOSIS — I10 ESSENTIAL HYPERTENSION: ICD-10-CM

## 2019-11-14 DIAGNOSIS — E83.42 HYPOMAGNESEMIA: ICD-10-CM

## 2019-11-14 DIAGNOSIS — Z51.81 MEDICATION MONITORING ENCOUNTER: ICD-10-CM

## 2019-11-14 LAB
ALBUMIN SERPL BCP-MCNC: 4.6 G/DL (ref 3.2–4.9)
ALBUMIN/GLOB SERPL: 1.8 G/DL
ALP SERPL-CCNC: 45 U/L (ref 30–99)
ALT SERPL-CCNC: 12 U/L (ref 2–50)
ANION GAP SERPL CALC-SCNC: 7 MMOL/L (ref 0–11.9)
AST SERPL-CCNC: 16 U/L (ref 12–45)
BASOPHILS # BLD AUTO: 0.4 % (ref 0–1.8)
BASOPHILS # BLD: 0.03 K/UL (ref 0–0.12)
BILIRUB SERPL-MCNC: 1 MG/DL (ref 0.1–1.5)
BUN SERPL-MCNC: 19 MG/DL (ref 8–22)
CALCIUM SERPL-MCNC: 10.3 MG/DL (ref 8.5–10.5)
CHLORIDE SERPL-SCNC: 106 MMOL/L (ref 96–112)
CHOLEST SERPL-MCNC: 192 MG/DL (ref 100–199)
CO2 SERPL-SCNC: 27 MMOL/L (ref 20–33)
CREAT SERPL-MCNC: 1.16 MG/DL (ref 0.5–1.4)
EOSINOPHIL # BLD AUTO: 0.11 K/UL (ref 0–0.51)
EOSINOPHIL NFR BLD: 1.6 % (ref 0–6.9)
ERYTHROCYTE [DISTWIDTH] IN BLOOD BY AUTOMATED COUNT: 44.1 FL (ref 35.9–50)
GLOBULIN SER CALC-MCNC: 2.6 G/DL (ref 1.9–3.5)
GLUCOSE SERPL-MCNC: 91 MG/DL (ref 65–99)
HAV IGM SERPL QL IA: NEGATIVE
HBV CORE IGM SER QL: NEGATIVE
HBV SURFACE AG SER QL: NEGATIVE
HCT VFR BLD AUTO: 44 % (ref 37–47)
HCV AB SER QL: NEGATIVE
HDLC SERPL-MCNC: 58 MG/DL
HGB BLD-MCNC: 15 G/DL (ref 12–16)
IMM GRANULOCYTES # BLD AUTO: 0.02 K/UL (ref 0–0.11)
IMM GRANULOCYTES NFR BLD AUTO: 0.3 % (ref 0–0.9)
LDLC SERPL CALC-MCNC: 113 MG/DL
LYMPHOCYTES # BLD AUTO: 1.38 K/UL (ref 1–4.8)
LYMPHOCYTES NFR BLD: 20.5 % (ref 22–41)
MAGNESIUM SERPL-MCNC: 1.8 MG/DL (ref 1.5–2.5)
MCH RBC QN AUTO: 33.6 PG (ref 27–33)
MCHC RBC AUTO-ENTMCNC: 34.1 G/DL (ref 33.6–35)
MCV RBC AUTO: 98.7 FL (ref 81.4–97.8)
MONOCYTES # BLD AUTO: 0.42 K/UL (ref 0–0.85)
MONOCYTES NFR BLD AUTO: 6.3 % (ref 0–13.4)
NEUTROPHILS # BLD AUTO: 4.76 K/UL (ref 2–7.15)
NEUTROPHILS NFR BLD: 70.9 % (ref 44–72)
NRBC # BLD AUTO: 0 K/UL
NRBC BLD-RTO: 0 /100 WBC
PLATELET # BLD AUTO: 233 K/UL (ref 164–446)
PMV BLD AUTO: 10.8 FL (ref 9–12.9)
POTASSIUM SERPL-SCNC: 4 MMOL/L (ref 3.6–5.5)
PROT SERPL-MCNC: 7.2 G/DL (ref 6–8.2)
RBC # BLD AUTO: 4.46 M/UL (ref 4.2–5.4)
SODIUM SERPL-SCNC: 140 MMOL/L (ref 135–145)
TRIGL SERPL-MCNC: 104 MG/DL (ref 0–149)
TSH SERPL DL<=0.005 MIU/L-ACNC: 3.5 UIU/ML (ref 0.38–5.33)
URATE SERPL-MCNC: 4.2 MG/DL (ref 1.9–8.2)
VIT B12 SERPL-MCNC: 897 PG/ML (ref 211–911)
WBC # BLD AUTO: 6.7 K/UL (ref 4.8–10.8)

## 2019-11-14 PROCEDURE — 82607 VITAMIN B-12: CPT

## 2019-11-14 PROCEDURE — 84443 ASSAY THYROID STIM HORMONE: CPT

## 2019-11-14 PROCEDURE — 80053 COMPREHEN METABOLIC PANEL: CPT

## 2019-11-14 PROCEDURE — 85025 COMPLETE CBC W/AUTO DIFF WBC: CPT

## 2019-11-14 PROCEDURE — 36415 COLL VENOUS BLD VENIPUNCTURE: CPT

## 2019-11-14 PROCEDURE — 80061 LIPID PANEL: CPT

## 2019-11-14 PROCEDURE — 83735 ASSAY OF MAGNESIUM: CPT

## 2019-11-14 PROCEDURE — 84550 ASSAY OF BLOOD/URIC ACID: CPT

## 2019-11-14 PROCEDURE — 80074 ACUTE HEPATITIS PANEL: CPT

## 2019-11-21 ENCOUNTER — OFFICE VISIT (OUTPATIENT)
Dept: MEDICAL GROUP | Facility: PHYSICIAN GROUP | Age: 79
End: 2019-11-21
Payer: MEDICARE

## 2019-11-21 VITALS
DIASTOLIC BLOOD PRESSURE: 80 MMHG | SYSTOLIC BLOOD PRESSURE: 140 MMHG | HEIGHT: 61 IN | OXYGEN SATURATION: 98 % | TEMPERATURE: 97.8 F | RESPIRATION RATE: 14 BRPM | BODY MASS INDEX: 23.53 KG/M2 | HEART RATE: 59 BPM | WEIGHT: 124.6 LBS

## 2019-11-21 DIAGNOSIS — E83.42 HYPOMAGNESEMIA: ICD-10-CM

## 2019-11-21 DIAGNOSIS — M10.9 GOUT OF BOTH FEET: ICD-10-CM

## 2019-11-21 DIAGNOSIS — Z51.81 MEDICATION MONITORING ENCOUNTER: ICD-10-CM

## 2019-11-21 DIAGNOSIS — G25.0 ESSENTIAL TREMOR: ICD-10-CM

## 2019-11-21 DIAGNOSIS — E78.5 DYSLIPIDEMIA: ICD-10-CM

## 2019-11-21 DIAGNOSIS — I10 ESSENTIAL HYPERTENSION: ICD-10-CM

## 2019-11-21 DIAGNOSIS — N18.30 CKD (CHRONIC KIDNEY DISEASE), STAGE III (HCC): ICD-10-CM

## 2019-11-21 PROBLEM — R03.0 ELEVATED BLOOD PRESSURE READING: Status: RESOLVED | Noted: 2019-10-22 | Resolved: 2019-11-21

## 2019-11-21 PROCEDURE — 99214 OFFICE O/P EST MOD 30 MIN: CPT | Performed by: FAMILY MEDICINE

## 2019-11-21 RX ORDER — HYDROCHLOROTHIAZIDE 12.5 MG/1
12.5 TABLET ORAL DAILY
Qty: 90 TAB | Refills: 0 | Status: SHIPPED | OUTPATIENT
Start: 2019-11-21 | End: 2019-12-02

## 2019-11-21 RX ORDER — MAGNESIUM OXIDE 400 MG/1
400 TABLET ORAL DAILY
COMMUNITY
End: 2023-08-02

## 2019-11-21 RX ORDER — ATORVASTATIN CALCIUM 20 MG/1
20 TABLET, FILM COATED ORAL
Qty: 90 TAB | Refills: 1 | Status: SHIPPED | OUTPATIENT
Start: 2019-11-21 | End: 2020-02-19 | Stop reason: SDUPTHER

## 2019-11-22 NOTE — PATIENT INSTRUCTIONS
Diagnoses and all orders for this visit:    Essential hypertension  -     hydroCHLOROthiazide (HYDRODIURIL) 12.5 MG tablet; Take 1 Tab by mouth every day.    Essential tremor  -     Comp Metabolic Panel; Future  -     URIC ACID, SERUM    Gout of both feet  -     URIC ACID, SERUM    Medication monitoring encounter  -     Comp Metabolic Panel; Future  -     URIC ACID, SERUM    Dyslipidemia  -     atorvastatin (LIPITOR) 20 MG Tab; Take 1 Tab by mouth every bedtime.    CKD (chronic kidney disease), stage III (HCC)  -     Comp Metabolic Panel; Future  -     URIC ACID, SERUM    Hypomagnesemia    -  November 14 we did do her lab work and her GFR was 45 and she is seeing her nephrologist and her GFR before was 42 which has improved mildly, her uric acid levels are 4.2, complete blood count within normal limits, complete metabolic panel within normal limits, TSH thyroid within normal limits, lipid panel with total cholesterol 192, triglycerides 104, HDL 58 and , vitamin B12 within normal limits, magnesium 1.8, hepatitis panel negative.  -She will stop her simvastatin 10 mg and we will switch her to atorvastatin 20 mg to do daily as she was only taking other medication every second day and her LDL is increased and also for blood pressure we will add hydrochlorothiazide 12.5 mg but use with caution due to the history of gout however the chair he has been helping and she will read about gout and try to avoid the things that can cause gout and her essential tremor is normal and she can read about that.  1 week before you see me please get your CMP and uric acid checked.  Please also continue doing your blood pressure and heart rate logs for myself and the cardiologist she will see in December 2 in December 4 seeing the nephrologist and her kidney function has improved slightly and we we will take her off allopurinol 100 mg since she has not had any episodes of the gout.  We will continue the other blood pressure  medications of amlodipine 5 mg and losartan 100 mg and we will start magnesium oxide 400 mg once daily unless it is upsetting her stomach otherwise we will see her back in 8 weeks to go over the lab work and her blood pressure.    Return in about 8 weeks (around 1/16/2020), or BP/lab FU/new med.      Essential Tremor  Introduction  A tremor is trembling or shaking that you cannot control. Most tremors affect the hands or arms. Tremors can also affect the head, vocal cords, face, and other parts of the body.  Essential tremor is a tremor without a known cause.  What are the causes?  Essential tremor has no known cause.  What increases the risk?  You may be at greater risk of essential tremor if:  · You have a family member with essential tremor.  · You are age 40 or older.  · You take certain medicines.  What are the signs or symptoms?  The main sign of a tremor is uncontrolled and unintentional rhythmic shaking of a body part.  · You may have difficulty eating with a spoon or fork.  · You may have difficulty writing.  · You may nod your head up and down or side to side.  · You may have a quivering voice.  Your tremors:  · May get worse over time.  · May come and go.  · May be more noticeable on one side of your body.  · May get worse due to stress, fatigue, caffeine, and extreme heat or cold.  How is this diagnosed?  Your health care provider can diagnose essential tremor based on your symptoms, medical history, and a physical examination. There is no single test to diagnose an essential tremor. However, your health care provider may perform a variety of tests to rule out other conditions. Tests may include:  · Blood and urine tests.  · Imaging studies of your brain, such as:  ¨ CT scan.  ¨ MRI.  · A test that measures involuntary muscle movement (electromyogram).  How is this treated?  Your tremors may go away without treatment. Mild tremors may not need treatment if they do not affect your day-to-day life. Severe  tremors may need to be treated using one or a combination of the following options:  · Medicines. This may include medicine that is injected.  · Lifestyle changes.  · Physical therapy.  Follow these instructions at home:  · Take medicines only as directed by your health care provider.  · Limit alcohol intake to no more than 1 drink per day for nonpregnant women and 2 drinks per day for men. One drink equals 12 oz of beer, 5 oz of wine, or 1½ oz of hard liquor.  · Do not use any tobacco products, including cigarettes, chewing tobacco, or electronic cigarettes. If you need help quitting, ask your health care provider.  · Take medicines only as directed by your health care provider.  · Avoid extreme heat or cold.  · Limit the amount of caffeine you consume as directed by your health care provider.  · Try to get eight hours of sleep each night.  · Find ways to manage your stress, such as meditation or yoga.  · Keep all follow-up visits as directed by your health care provider. This is important. This includes any physical therapy visits.  Contact a health care provider if:  · You experience any changes in the location or intensity of your tremors.  · You start having a tremor after starting a new medicine.  · You have tremor with other symptoms such as:  ¨ Numbness.  ¨ Tingling.  ¨ Pain.  ¨ Weakness.  · Your tremor gets worse.  · Your tremor interferes with your daily life.  This information is not intended to replace advice given to you by your health care provider. Make sure you discuss any questions you have with your health care provider.      Gout  Gout is painful swelling that can occur in some of your joints. Gout is a type of arthritis. This condition is caused by having too much uric acid in your body. Uric acid is a chemical that forms when your body breaks down substances called purines. Purines are important for building body proteins.  When your body has too much uric acid, sharp crystals can form and build  up inside your joints. This causes pain and swelling. Gout attacks can happen quickly and be very painful (acute gout). Over time, the attacks can affect more joints and become more frequent (chronic gout). Gout can also cause uric acid to build up under your skin and inside your kidneys.  What are the causes?  This condition is caused by too much uric acid in your blood. This can occur because:  · Your kidneys do not remove enough uric acid from your blood. This is the most common cause.  · Your body makes too much uric acid. This can occur with some cancers and cancer treatments. It can also occur if your body is breaking down too many red blood cells (hemolytic anemia).  · You eat too many foods that are high in purines. These foods include organ meats and some seafood. Alcohol, especially beer, is also high in purines.  A gout attack may be triggered by trauma or stress.  What increases the risk?  This condition is more likely to develop in people who:  · Have a family history of gout.  · Are male and middle-aged.  · Are female and have gone through menopause.  · Are obese.  · Frequently drink alcohol, especially beer.  · Are dehydrated.  · Lose weight too quickly.  · Have an organ transplant.  · Have lead poisoning.  · Take certain medicines, including aspirin, cyclosporine, diuretics, levodopa, and niacin.  · Have kidney disease or psoriasis.  What are the signs or symptoms?  An attack of acute gout happens quickly. It usually occurs in just one joint. The most common place is the big toe. Attacks often start at night. Other joints that may be affected include joints of the feet, ankle, knee, fingers, wrist, or elbow. Symptoms may include:  · Severe pain.  · Warmth.  · Swelling.  · Stiffness.  · Tenderness. The affected joint may be very painful to touch.  · Shiny, red, or purple skin.  · Chills and fever.  Chronic gout may cause symptoms more frequently. More joints may be involved. You may also have white or  yellow lumps (tophi) on your hands or feet or in other areas near your joints.  How is this diagnosed?  This condition is diagnosed based on your symptoms, medical history, and physical exam. You may have tests, such as:  · Blood tests to measure uric acid levels.  · Removal of joint fluid with a needle (aspiration) to look for uric acid crystals.  · X-rays to look for joint damage.  How is this treated?  Treatment for this condition has two phases: treating an acute attack and preventing future attacks. Acute gout treatment may include medicines to reduce pain and swelling, including:  · NSAIDs.  · Steroids. These are strong anti-inflammatory medicines that can be taken by mouth (orally) or injected into a joint.  · Colchicine. This medicine relieves pain and swelling when it is taken soon after an attack. It can be given orally or through an IV tube.  Preventive treatment may include:  · Daily use of smaller doses of NSAIDs or colchicine.  · Use of a medicine that reduces uric acid levels in your blood.  · Changes to your diet. You may need to see a specialist about healthy eating (dietitian).  Follow these instructions at home:  During a Gout Attack  · If directed, apply ice to the affected area:  ¨ Put ice in a plastic bag.  ¨ Place a towel between your skin and the bag.  ¨ Leave the ice on for 20 minutes, 2-3 times a day.  · Rest the joint as much as possible. If the affected joint is in your leg, you may be given crutches to use.  · Raise (elevate) the affected joint above the level of your heart as often as possible.  · Drink enough fluids to keep your urine clear or pale yellow.  · Take over-the-counter and prescription medicines only as told by your health care provider.  · Do not drive or operate heavy machinery while taking prescription pain medicine.  · Follow instructions from your health care provider about eating or drinking restrictions.  · Return to your normal activities as told by your health care  provider. Ask your health care provider what activities are safe for you.  Avoiding Future Gout Attacks  · Follow a low-purine diet as told by your dietitian or health care provider. Avoid foods and drinks that are high in purines, including liver, kidney, anchovies, asparagus, herring, mushrooms, mussels, and beer.  · Limit alcohol intake to no more than 1 drink a day for nonpregnant women and 2 drinks a day for men. One drink equals 12 oz of beer, 5 oz of wine, or 1½ oz of hard liquor.  · Maintain a healthy weight or lose weight if you are overweight. If you want to lose weight, talk with your health care provider. It is important that you do not lose weight too quickly.  · Start or maintain an exercise program as told by your health care provider.  · Drink enough fluids to keep your urine clear or pale yellow.  · Take over-the-counter and prescription medicines only as told by your health care provider.  · Keep all follow-up visits as told by your health care provider. This is important.  Contact a health care provider if:  · You have another gout attack.  · You continue to have symptoms of a gout attack after10 days of treatment.  · You have side effects from your medicines.  · You have chills or a fever.  · You have burning pain when you urinate.  · You have pain in your lower back or belly.  Get help right away if:  · You have severe or uncontrolled pain.  · You cannot urinate.  This information is not intended to replace advice given to you by your health care provider. Make sure you discuss any questions you have with your health care provider.  Document Released: 12/15/2001 Document Revised: 05/25/2017 Document Reviewed: 09/29/2016  [employer] Education © 2017 [employer]

## 2019-11-22 NOTE — PROGRESS NOTES
cc: Gout resolved, hypertension, essential tremor, chronic kidney disease stage III, dyslipidemia, hypomagnesemia    Subjective:     Danielle Jon is a 79 y.o. female presenting she has been checking her blood pressure and her blood pressure ranges have been the lowest systolic reading being 126 and the highest being 162 and her average being 130s to 140s and her diastolic has been in the 60s and her heart rate in the 60s and lowest it has been 53.  No chest pain, shortness of breath or palms breathing.  She did go for the hearing screen and they said she does not need hearing aids and so the ear lavage and the drops have helped once the wax was taken out.  Her essential tremor does not bother her much.  And since she started taking the cherry supplementation she and takes allopurinol 100 mg she has not had any issues with the gout.  She has been taking amlodipine the new medication I gave her 5 mg in the afternoon which restarted October 22 and her blood pressure has improved.  Number second she has appointment with the cardiologist in December 4 with her nephrologist.  Her headaches have improved.  November 14 we did do her lab work and her GFR was 45 and she is seeing her nephrologist and her GFR before was 42 which has improved mildly, her uric acid levels are 4.2, complete blood count within normal limits, complete metabolic panel within normal limits, TSH thyroid within normal limits, lipid panel with total cholesterol 192, triglycerides 104, HDL 58 and , vitamin B12 within normal limits, magnesium 1.8, hepatitis panel negative.    Review of systems:     Constitutional: Negative for fever, chills and negative fatigue.   HENT: Negative for sinus pressure  Eyes: Negative for blurriness, negative for double vision  Respiratory: Negative for cough and shortness of breath, negative for exertional shortness of breath  Cardiovascular: Negative for leg swelling, negative for palpitations, negative for chest  pain  Gastrointestinal: Negative for nausea, vomiting, abdominal pain, constipation and diarrhea.  Genitourinary: Negative for dysuria and hematuria.   Skin: Negative for rash.   Neurological: Negative for dizziness, focal weakness and headaches.   Endo/Heme/Allergies: Denies bleeding, bruising, and recurrent allergies.  Psychiatric/Behavioral: Negative for depression and anxiety.        Current Outpatient Medications:   •  atorvastatin (LIPITOR) 20 MG Tab, Take 1 Tab by mouth every bedtime., Disp: 90 Tab, Rfl: 1  •  magnesium oxide (MAG-OX) 400 MG Tab, Take 400 mg by mouth every day., Disp: , Rfl:   •  hydroCHLOROthiazide (HYDRODIURIL) 12.5 MG tablet, Take 1 Tab by mouth every day., Disp: 90 Tab, Rfl: 0  •  amLODIPine (NORVASC) 5 MG Tab, Take 1 Tab by mouth every day., Disp: 90 Tab, Rfl: 0  •  carbamide peroxide (CARBAMOXIDE EAR DROPS) 6.5 % Solution, Place 6 Drops in ear 2 times a day. Administer drops in both ears., Disp: 1 Bottle, Rfl: 1  •  metoprolol SR (TOPROL XL) 50 MG TABLET SR 24 HR, TAKE ONE TABLET BY MOUTH EVERY DAY, Disp: 90 Tab, Rfl: 1  •  losartan (COZAAR) 100 MG Tab, TAKE ONE TABLET BY MOUTH EVERY DAY, Disp: 90 Tab, Rfl: 1  •  levothyroxine (SYNTHROID) 50 MCG Tab, TAKE 1 TABLET BY MOUTH EVERY MORNING ON AN EMPTY STOMACH, Disp: 90 Tab, Rfl: 2  •  Acetaminophen (TYLENOL 8 HOUR PO), Take  by mouth., Disp: , Rfl:   •  Cetirizine HCl 10 MG Cap, Take  by mouth., Disp: , Rfl:   •  cyanocobalamin (VITAMIN B-12) 500 MCG Tab, Take 500 mcg by mouth every day., Disp: , Rfl:   •  aspirin 81 MG tablet, Take 81 mg by mouth every day., Disp: , Rfl:   •  Cholecalciferol (VITAMIN D3) 1000 UNITS Cap, Take 2,000 Units by mouth every day., Disp: , Rfl:     Allergies, past medical history, past surgical history, family history, social history reviewed and updated    Objective:     Vitals: /80 (BP Location: Left arm, Patient Position: Sitting, BP Cuff Size: Adult)   Pulse (!) 59   Temp 36.6 °C (97.8 °F) (Temporal)   " Resp 14   Ht 1.549 m (5' 1\")   Wt 56.5 kg (124 lb 9.6 oz)   SpO2 98%   BMI 23.54 kg/m²   General: Alert, pleasant, NAD  HEENT: Normocephalic.  Nontraumatic. EOMI, no icterus or pallor.  Conjunctivae and lids normal. External ears normal. Oropharynx non-erythematous, mucous membranes moist.  Neck supple.  No thyromegaly or masses palpated. No cervical or supraclavicular lymphadenopathy.  Heart: Regular rate and rhythm.  S1 and S2 normal.  No murmurs appreciated.  Respiratory: Normal respiratory effort.  Clear to auscultation bilaterally.  Abdomen: Non-distended, soft, non tender in all 4 quadrants.  Skin: Warm, dry, no rashes.  Musculoskeletal: Gait is normal.  Moves all extremities well.  Extremities: No leg edema.  Pedal pulses 2+ symmetric.   Psych:  Affect/mood is normal, judgement is good, memory is intact, grooming is appropriate.    Assessment/Plan:     Diagnoses and all orders for this visit:    Essential hypertension  -     hydroCHLOROthiazide (HYDRODIURIL) 12.5 MG tablet; Take 1 Tab by mouth every day.    Essential tremor  -     Comp Metabolic Panel; Future  -     URIC ACID, SERUM    Gout of both feet  -     URIC ACID, SERUM    Medication monitoring encounter  -     Comp Metabolic Panel; Future  -     URIC ACID, SERUM    Dyslipidemia  -     atorvastatin (LIPITOR) 20 MG Tab; Take 1 Tab by mouth every bedtime.    CKD (chronic kidney disease), stage III (HCC)  -     Comp Metabolic Panel; Future  -     URIC ACID, SERUM    Hypomagnesemia    -  November 14 we did do her lab work and her GFR was 45 and she is seeing her nephrologist and her GFR before was 42 which has improved mildly, her uric acid levels are 4.2, complete blood count within normal limits, complete metabolic panel within normal limits, TSH thyroid within normal limits, lipid panel with total cholesterol 192, triglycerides 104, HDL 58 and , vitamin B12 within normal limits, magnesium 1.8, hepatitis panel negative.  -She will stop her " simvastatin 10 mg and we will switch her to atorvastatin 20 mg to do daily as she was only taking other medication every second day and her LDL is increased and also for blood pressure we will add hydrochlorothiazide 12.5 mg but use with caution due to the history of gout however the chair he has been helping and she will read about gout and try to avoid the things that can cause gout and her essential tremor is normal and she can read about that.  1 week before you see me please get your CMP and uric acid checked.  Please also continue doing your blood pressure and heart rate logs for myself and the cardiologist she will see in December 2 in December 4 seeing the nephrologist and her kidney function has improved slightly and we we will take her off allopurinol 100 mg since she has not had any episodes of the gout.  We will continue the other blood pressure medications of amlodipine 5 mg and losartan 100 mg and we will start magnesium oxide 400 mg once daily unless it is upsetting her stomach otherwise we will see her back in 8 weeks to go over the lab work and her blood pressure.  Ears checked today and they are within normal limits.    Return in about 8 weeks (around 1/16/2020), or BP/lab FU/new med.

## 2019-11-26 ENCOUNTER — HOSPITAL ENCOUNTER (OUTPATIENT)
Dept: LAB | Facility: MEDICAL CENTER | Age: 79
End: 2019-11-26
Attending: INTERNAL MEDICINE
Payer: MEDICARE

## 2019-11-26 DIAGNOSIS — D64.9 ANEMIA, UNSPECIFIED TYPE: ICD-10-CM

## 2019-11-26 DIAGNOSIS — E55.9 VITAMIN D DEFICIENCY: ICD-10-CM

## 2019-11-26 DIAGNOSIS — N18.30 CKD (CHRONIC KIDNEY DISEASE), STAGE III (HCC): ICD-10-CM

## 2019-11-26 LAB
25(OH)D3 SERPL-MCNC: 45 NG/ML (ref 30–100)
ANION GAP SERPL CALC-SCNC: 8 MMOL/L (ref 0–11.9)
BUN SERPL-MCNC: 18 MG/DL (ref 8–22)
CALCIUM SERPL-MCNC: 10 MG/DL (ref 8.5–10.5)
CHLORIDE SERPL-SCNC: 108 MMOL/L (ref 96–112)
CO2 SERPL-SCNC: 27 MMOL/L (ref 20–33)
CREAT SERPL-MCNC: 1.11 MG/DL (ref 0.5–1.4)
CREAT UR-MCNC: 75 MG/DL
ERYTHROCYTE [DISTWIDTH] IN BLOOD BY AUTOMATED COUNT: 43.7 FL (ref 35.9–50)
GLUCOSE SERPL-MCNC: 81 MG/DL (ref 65–99)
HCT VFR BLD AUTO: 42.2 % (ref 37–47)
HGB BLD-MCNC: 14.5 G/DL (ref 12–16)
MCH RBC QN AUTO: 33.5 PG (ref 27–33)
MCHC RBC AUTO-ENTMCNC: 34.4 G/DL (ref 33.6–35)
MCV RBC AUTO: 97.5 FL (ref 81.4–97.8)
MICROALBUMIN UR-MCNC: 2.1 MG/DL
MICROALBUMIN/CREAT UR: 28 MG/G (ref 0–30)
PLATELET # BLD AUTO: 230 K/UL (ref 164–446)
PMV BLD AUTO: 11.1 FL (ref 9–12.9)
POTASSIUM SERPL-SCNC: 4.5 MMOL/L (ref 3.6–5.5)
RBC # BLD AUTO: 4.33 M/UL (ref 4.2–5.4)
SODIUM SERPL-SCNC: 143 MMOL/L (ref 135–145)
WBC # BLD AUTO: 5.7 K/UL (ref 4.8–10.8)

## 2019-11-26 PROCEDURE — 80048 BASIC METABOLIC PNL TOTAL CA: CPT

## 2019-11-26 PROCEDURE — 82306 VITAMIN D 25 HYDROXY: CPT

## 2019-11-26 PROCEDURE — 85027 COMPLETE CBC AUTOMATED: CPT

## 2019-11-26 PROCEDURE — 82043 UR ALBUMIN QUANTITATIVE: CPT

## 2019-11-26 PROCEDURE — 36415 COLL VENOUS BLD VENIPUNCTURE: CPT

## 2019-11-26 PROCEDURE — 82570 ASSAY OF URINE CREATININE: CPT

## 2019-11-26 PROCEDURE — 83970 ASSAY OF PARATHORMONE: CPT

## 2019-11-27 LAB — PTH-INTACT SERPL-MCNC: 115 PG/ML (ref 14–72)

## 2019-12-02 ENCOUNTER — OFFICE VISIT (OUTPATIENT)
Dept: CARDIOLOGY | Facility: MEDICAL CENTER | Age: 79
End: 2019-12-02
Payer: MEDICARE

## 2019-12-02 VITALS
HEIGHT: 61 IN | DIASTOLIC BLOOD PRESSURE: 78 MMHG | OXYGEN SATURATION: 99 % | BODY MASS INDEX: 23.2 KG/M2 | WEIGHT: 122.91 LBS | SYSTOLIC BLOOD PRESSURE: 154 MMHG | HEART RATE: 60 BPM

## 2019-12-02 DIAGNOSIS — E78.5 DYSLIPIDEMIA: ICD-10-CM

## 2019-12-02 DIAGNOSIS — I10 ESSENTIAL HYPERTENSION: ICD-10-CM

## 2019-12-02 DIAGNOSIS — Z91.89 10 YEAR RISK OF MI OR STROKE 7.5% OR GREATER: ICD-10-CM

## 2019-12-02 DIAGNOSIS — N18.30 CKD (CHRONIC KIDNEY DISEASE), STAGE III (HCC): ICD-10-CM

## 2019-12-02 DIAGNOSIS — G45.9 TIA (TRANSIENT ISCHEMIC ATTACK): ICD-10-CM

## 2019-12-02 DIAGNOSIS — I49.9 IRREGULAR HEART BEAT: ICD-10-CM

## 2019-12-02 PROCEDURE — 93000 ELECTROCARDIOGRAM COMPLETE: CPT | Performed by: INTERNAL MEDICINE

## 2019-12-02 PROCEDURE — 99204 OFFICE O/P NEW MOD 45 MIN: CPT | Performed by: INTERNAL MEDICINE

## 2019-12-02 RX ORDER — HYDROCHLOROTHIAZIDE 25 MG/1
25 TABLET ORAL DAILY
Qty: 90 TAB | Refills: 3 | Status: SHIPPED
Start: 2019-12-02 | End: 2020-02-06

## 2019-12-02 RX ORDER — AMLODIPINE BESYLATE 10 MG/1
10 TABLET ORAL DAILY
Qty: 90 TAB | Refills: 3 | Status: SHIPPED
Start: 2019-12-02 | End: 2020-02-06

## 2019-12-02 ASSESSMENT — ENCOUNTER SYMPTOMS
IRREGULAR HEARTBEAT: 0
ABDOMINAL PAIN: 0
HEARTBURN: 0
DYSPNEA ON EXERTION: 0
ALTERED MENTAL STATUS: 0
DIZZINESS: 0
NAUSEA: 0
DEPRESSION: 0
WEIGHT GAIN: 0
ORTHOPNEA: 0
SHORTNESS OF BREATH: 0
COUGH: 0
NEAR-SYNCOPE: 0
FLANK PAIN: 0
PALPITATIONS: 0
VOMITING: 0
BLURRED VISION: 0
SYNCOPE: 0
DECREASED APPETITE: 0
PND: 0
DIARRHEA: 0
CONSTIPATION: 0
FEVER: 0
CLAUDICATION: 0
WEIGHT LOSS: 0
BACK PAIN: 0

## 2019-12-02 NOTE — PROGRESS NOTES
Cardiology Note    Chief Complaint   Patient presents with   • Hypertension       History of Present Illness: Danielle Jon is a 79 y.o. female PMH HTN, HLD, hypothyroid, CKD who presents for initial visit.    Referred for BP management and irregular rhythm. States several weeks ago SBP in 200s. Denies cardiac symptoms as a result.     Review of Systems   Constitution: Negative for decreased appetite, fever, malaise/fatigue, weight gain and weight loss.   HENT: Negative for congestion and nosebleeds.    Eyes: Negative for blurred vision.   Cardiovascular: Negative for chest pain, claudication, dyspnea on exertion, irregular heartbeat, leg swelling, near-syncope, orthopnea, palpitations, paroxysmal nocturnal dyspnea and syncope.   Respiratory: Negative for cough and shortness of breath.    Endocrine: Negative for cold intolerance and heat intolerance.   Skin: Negative for rash.   Musculoskeletal: Negative for back pain.   Gastrointestinal: Negative for abdominal pain, constipation, diarrhea, heartburn, melena, nausea and vomiting.   Genitourinary: Negative for dysuria, flank pain and hematuria.   Neurological: Negative for dizziness.   Psychiatric/Behavioral: Negative for altered mental status and depression.         Past Medical History:   Diagnosis Date   • Cataract    • CKD (chronic kidney disease), stage III (HCC) 11/29/2017   • Essential hypertension 2/19/2016   • Gout of both feet 6/6/2017   • Hypercalcemia 12/14/2017   • Hypertriglyceridemia 4/4/2017   • Hypothyroidism 2/19/2016   • Irregular heart beat 10/22/2019   • Stroke (HCC)     TIA at age 33         Past Surgical History:   Procedure Laterality Date   • CATARACT EXTRACTION WITH IOL Bilateral 2014   • EYE SURGERY      lasix    • MAMMOPLASTY REDUCTION           Current Outpatient Medications   Medication Sig Dispense Refill   • hydroCHLOROthiazide (HYDRODIURIL) 25 MG Tab Take 1 Tab by mouth every day. 90 Tab 3   • amLODIPine (NORVASC) 10 MG Tab Take 1  Tab by mouth every day. 90 Tab 3   • atorvastatin (LIPITOR) 20 MG Tab Take 1 Tab by mouth every bedtime. 90 Tab 1   • carbamide peroxide (CARBAMOXIDE EAR DROPS) 6.5 % Solution Place 6 Drops in ear 2 times a day. Administer drops in both ears. 1 Bottle 1   • losartan (COZAAR) 100 MG Tab TAKE ONE TABLET BY MOUTH EVERY DAY 90 Tab 1   • levothyroxine (SYNTHROID) 50 MCG Tab TAKE 1 TABLET BY MOUTH EVERY MORNING ON AN EMPTY STOMACH 90 Tab 2   • Acetaminophen (TYLENOL 8 HOUR PO) Take  by mouth.     • Cetirizine HCl 10 MG Cap Take  by mouth.     • cyanocobalamin (VITAMIN B-12) 500 MCG Tab Take 500 mcg by mouth every day.     • aspirin 81 MG tablet Take 81 mg by mouth every day.     • Cholecalciferol (VITAMIN D3) 1000 UNITS Cap Take 2,000 Units by mouth every day.     • magnesium oxide (MAG-OX) 400 MG Tab Take 400 mg by mouth every day.       No current facility-administered medications for this visit.          Allergies   Allergen Reactions   • Niacin Rash and Swelling     Rash/itching/swelling         Family History   Problem Relation Age of Onset   • Hypertension Mother    • Heart Disease Mother         chf   • Lung Disease Father    • Hypertension Sister    • Hyperlipidemia Sister    • Hypertension Brother          Social History     Socioeconomic History   • Marital status:      Spouse name: Not on file   • Number of children: Not on file   • Years of education: Not on file   • Highest education level: Not on file   Occupational History   • Not on file   Social Needs   • Financial resource strain: Not on file   • Food insecurity:     Worry: Not on file     Inability: Not on file   • Transportation needs:     Medical: Not on file     Non-medical: Not on file   Tobacco Use   • Smoking status: Never Smoker   • Smokeless tobacco: Never Used   Substance and Sexual Activity   • Alcohol use: Yes     Alcohol/week: 8.4 oz     Types: 14 Glasses of wine per week     Frequency: 2-3 times a week     Drinks per session: 1 or 2  "    Comment: 2 glasses of wine daily   • Drug use: No   • Sexual activity: Yes     Partners: Male   Lifestyle   • Physical activity:     Days per week: Not on file     Minutes per session: Not on file   • Stress: Not on file   Relationships   • Social connections:     Talks on phone: Not on file     Gets together: Not on file     Attends Taoist service: Not on file     Active member of club or organization: Not on file     Attends meetings of clubs or organizations: Not on file     Relationship status: Not on file   • Intimate partner violence:     Fear of current or ex partner: Not on file     Emotionally abused: Not on file     Physically abused: Not on file     Forced sexual activity: Not on file   Other Topics Concern   • Not on file   Social History Narrative    Retired from [employer].  Lives with her .  Having some hearing concerns.  Walking without walking assistance, mentally and physically.  Independent. No social or domestic concerns.         Physical Exam:  Ambulatory Vitals  /78 (BP Location: Left arm, Patient Position: Sitting, BP Cuff Size: Adult)   Pulse 60   Ht 1.549 m (5' 1\")   Wt 55.7 kg (122 lb 14.5 oz)   SpO2 99%    BP Readings from Last 4 Encounters:   12/02/19 154/78   11/21/19 140/80   10/22/19 (!) 184/84   10/11/19 (!) 162/84     Weight/BMI:   Vitals:    12/02/19 1522   BP: 154/78   Weight: 55.7 kg (122 lb 14.5 oz)   Height: 1.549 m (5' 1\")    Body mass index is 23.22 kg/m².  Wt Readings from Last 4 Encounters:   12/02/19 55.7 kg (122 lb 14.5 oz)   11/21/19 56.5 kg (124 lb 9.6 oz)   10/22/19 57.3 kg (126 lb 6.4 oz)   05/29/19 58.1 kg (128 lb)       Physical Exam   Constitutional: She is oriented to person, place, and time and well-developed, well-nourished, and in no distress. No distress.   HENT:   Head: Normocephalic and atraumatic.   Eyes: Pupils are equal, round, and reactive to light. Conjunctivae are normal.   Neck: Normal range of motion. Neck supple. No JVD present. " Carotid bruit is present.   Cardiovascular: Normal rate, regular rhythm, normal heart sounds and intact distal pulses. Exam reveals no gallop and no friction rub.   No murmur heard.  Pulmonary/Chest: Effort normal and breath sounds normal. No respiratory distress. She has no wheezes. She has no rales. She exhibits no tenderness.   Abdominal: Soft. Bowel sounds are normal. She exhibits no distension.   Musculoskeletal:         General: No edema.   Neurological: She is alert and oriented to person, place, and time.   Skin: Skin is warm and dry.   Psychiatric: Affect and judgment normal.       Lab Data Review:  Lab Results   Component Value Date/Time    CHOLSTRLTOT 192 11/14/2019 08:26 AM     (H) 11/14/2019 08:26 AM    HDL 58 11/14/2019 08:26 AM    TRIGLYCERIDE 104 11/14/2019 08:26 AM       Lab Results   Component Value Date/Time    SODIUM 143 11/26/2019 01:17 PM    POTASSIUM 4.5 11/26/2019 01:17 PM    CHLORIDE 108 11/26/2019 01:17 PM    CO2 27 11/26/2019 01:17 PM    GLUCOSE 81 11/26/2019 01:17 PM    BUN 18 11/26/2019 01:17 PM    CREATININE 1.11 11/26/2019 01:17 PM     Estimated Creatinine Clearance: 31.5 mL/min (by C-G formula based on SCr of 1.11 mg/dL).  Lab Results   Component Value Date/Time    ALKPHOSPHAT 45 11/14/2019 08:26 AM    ASTSGOT 16 11/14/2019 08:26 AM    ALTSGPT 12 11/14/2019 08:26 AM    TBILIRUBIN 1.0 11/14/2019 08:26 AM      Lab Results   Component Value Date/Time    WBC 5.7 11/26/2019 01:17 PM     No results found for: HBA1C  No components found for: TROP      Cardiac Imaging and Procedures Review:        Medical Decision Making:  Problem List Items Addressed This Visit     Essential hypertension     Goal <130/90. Remains elevated. Maximize HCTZ, amlodipine, and losartan. D/c metop. She will keep track at home. Will reserve secondary workup for if fails triple therapy. Check TTE for sequelae of htn. Check renal artery duplex given htn and ckd.         Relevant Medications     hydroCHLOROthiazide (HYDRODIURIL) 25 MG Tab    amLODIPine (NORVASC) 10 MG Tab    Other Relevant Orders    EKG (Completed)    US-RENAL ARTERY DUPLEX COMP    EC-ECHOCARDIOGRAM COMPLETE W/O CONT    Irregular heart beat     Check zio patch         Relevant Orders    EKG (Completed)    ZIO PATCH MONITOR    Dyslipidemia     Cont statin.         10 year risk of MI or stroke 7.5% or greater     >20%. Goal BP <130/80. Cont statin. Cont low dose asa.          Relevant Orders    EKG (Completed)    TIA (transient ischemic attack)     Describes hx of TIA when she was young. Check carotid ultrasound.         Relevant Medications    hydroCHLOROthiazide (HYDRODIURIL) 25 MG Tab    amLODIPine (NORVASC) 10 MG Tab    Other Relevant Orders    US-CAROTID DOPPLER BILAT          It was my pleasure to meet with Ms. Jon.

## 2019-12-03 ENCOUNTER — TELEPHONE (OUTPATIENT)
Dept: CARDIOLOGY | Facility: MEDICAL CENTER | Age: 79
End: 2019-12-03

## 2019-12-03 ENCOUNTER — PATIENT MESSAGE (OUTPATIENT)
Dept: MEDICAL GROUP | Facility: PHYSICIAN GROUP | Age: 79
End: 2019-12-03

## 2019-12-03 DIAGNOSIS — I10 ESSENTIAL HYPERTENSION: ICD-10-CM

## 2019-12-03 LAB — EKG IMPRESSION: NORMAL

## 2019-12-03 NOTE — TELEPHONE ENCOUNTER
LAITH iJ at [facility] is calling about patient's echo for tomorrow, she said she needs a correct diagnosis code. She can be reached at [phone].

## 2019-12-03 NOTE — TELEPHONE ENCOUNTER
Returned Margy's call. LVM with direct extension and general office number. Added additional ICD codes from pt's chart to echo, renal US, and carotid US.

## 2019-12-03 NOTE — ASSESSMENT & PLAN NOTE
Goal <130/90. Remains elevated. Maximize HCTZ, amlodipine, and losartan. D/c metop. She will keep track at home. Will reserve secondary workup for if fails triple therapy. Check TTE for sequelae of htn. Check renal artery duplex given htn and ckd.

## 2019-12-04 ENCOUNTER — HOSPITAL ENCOUNTER (OUTPATIENT)
Dept: CARDIOLOGY | Facility: MEDICAL CENTER | Age: 79
End: 2019-12-04
Attending: INTERNAL MEDICINE
Payer: MEDICARE

## 2019-12-04 ENCOUNTER — OFFICE VISIT (OUTPATIENT)
Dept: NEPHROLOGY | Facility: MEDICAL CENTER | Age: 79
End: 2019-12-04
Payer: MEDICARE

## 2019-12-04 ENCOUNTER — HOSPITAL ENCOUNTER (OUTPATIENT)
Dept: RADIOLOGY | Facility: MEDICAL CENTER | Age: 79
End: 2019-12-04
Attending: INTERNAL MEDICINE
Payer: MEDICARE

## 2019-12-04 VITALS
SYSTOLIC BLOOD PRESSURE: 118 MMHG | TEMPERATURE: 97.3 F | OXYGEN SATURATION: 99 % | WEIGHT: 123.6 LBS | HEART RATE: 64 BPM | BODY MASS INDEX: 23.33 KG/M2 | DIASTOLIC BLOOD PRESSURE: 50 MMHG | HEIGHT: 61 IN | RESPIRATION RATE: 16 BRPM

## 2019-12-04 DIAGNOSIS — N18.30 CKD (CHRONIC KIDNEY DISEASE), STAGE III (HCC): ICD-10-CM

## 2019-12-04 DIAGNOSIS — E55.9 VITAMIN D DEFICIENCY: ICD-10-CM

## 2019-12-04 DIAGNOSIS — I49.9 IRREGULAR HEART BEAT: ICD-10-CM

## 2019-12-04 DIAGNOSIS — I10 ESSENTIAL HYPERTENSION: ICD-10-CM

## 2019-12-04 DIAGNOSIS — M10.9 GOUT OF BOTH FEET: ICD-10-CM

## 2019-12-04 DIAGNOSIS — E21.1 HYPERPARATHYROIDISM DUE TO VITAMIN D DEFICIENCY (HCC): ICD-10-CM

## 2019-12-04 DIAGNOSIS — R80.9 MICROALBUMINURIA: ICD-10-CM

## 2019-12-04 DIAGNOSIS — E78.5 DYSLIPIDEMIA: ICD-10-CM

## 2019-12-04 DIAGNOSIS — G45.9 TIA (TRANSIENT ISCHEMIC ATTACK): ICD-10-CM

## 2019-12-04 DIAGNOSIS — Z91.89 10 YEAR RISK OF MI OR STROKE 7.5% OR GREATER: ICD-10-CM

## 2019-12-04 PROCEDURE — 93880 EXTRACRANIAL BILAT STUDY: CPT

## 2019-12-04 PROCEDURE — 93306 TTE W/DOPPLER COMPLETE: CPT

## 2019-12-04 PROCEDURE — 93306 TTE W/DOPPLER COMPLETE: CPT | Mod: 26 | Performed by: INTERNAL MEDICINE

## 2019-12-04 PROCEDURE — 99214 OFFICE O/P EST MOD 30 MIN: CPT | Performed by: INTERNAL MEDICINE

## 2019-12-04 ASSESSMENT — ENCOUNTER SYMPTOMS
DIARRHEA: 0
ABDOMINAL PAIN: 0
FEVER: 0
COUGH: 0
SHORTNESS OF BREATH: 0
EYES NEGATIVE: 1
PALPITATIONS: 0
WEIGHT LOSS: 0
FLANK PAIN: 0
VOMITING: 0
NAUSEA: 0
SINUS PAIN: 0
ORTHOPNEA: 0
HYPERTENSION: 1
CHILLS: 0
HEMOPTYSIS: 0
WHEEZING: 0

## 2019-12-04 ASSESSMENT — PAIN SCALES - GENERAL: PAINLEVEL: NO PAIN

## 2019-12-04 NOTE — TELEPHONE ENCOUNTER
Simona called back on behalf of Margy. The problem is with the carotid US order. If the reason for testing is because of bruit, this needs to be documented in VR's note. If this can be added prior to pt's scheduled testing tomorrow afternoon, then they can proceed with US. If not, Simona recommends calling their supervisor Judie at [phone] to ask for the Medicare carotid form.    To VR

## 2019-12-04 NOTE — PATIENT COMMUNICATION
Yes please let the patient know per the plan section of her last clinic visit to start taking magnesium oxide 400 mg daily and at your follow up appointment we will see if this is helping. They do not always cover this medication as a prescription so please pick this medication up over the counter, it is over the counter 400 mg Magnesium oxide daily. Thanks.

## 2019-12-04 NOTE — PROGRESS NOTES
Subjective:      Danielle Jon is a 79 y.o. female who presents with Follow-Up and Chronic Kidney Disease            Chronic Kidney Disease   Pertinent negatives include no abdominal pain, chest pain, chills, congestion, coughing, fever, nausea or vomiting.   Hypertension   Pertinent negatives include no chest pain, malaise/fatigue, orthopnea, palpitations or shortness of breath.     Danielle is coming today for HTN, CKD III f/u  Doing well, no complaints  No difficulties to urinate  No dysuria/hematuria/flank pain  HTN: BP elevated couple of weeks ago -seen in PCC added HCTZ, also consulted with Cardiology- stopped Metoprolol, scheduled workup for CAD, CHF  BP improved -well controlled now  CKD III - creat level stable at baseline stable    Review of Systems   Constitutional: Negative for chills, fever, malaise/fatigue and weight loss.   HENT: Negative for congestion, hearing loss and sinus pain.    Eyes: Negative.    Respiratory: Negative for cough, hemoptysis, shortness of breath and wheezing.    Cardiovascular: Negative for chest pain, palpitations, orthopnea and leg swelling.   Gastrointestinal: Negative for abdominal pain, diarrhea, nausea and vomiting.   Genitourinary: Negative for dysuria, flank pain, frequency, hematuria and urgency.   Skin: Negative.    All other systems reviewed and are negative.    Past Medical History:   Diagnosis Date   • Cataract    • CKD (chronic kidney disease), stage III (HCC) 11/29/2017   • Essential hypertension 2/19/2016   • Gout of both feet 6/6/2017   • Hypercalcemia 12/14/2017   • Hypertriglyceridemia 4/4/2017   • Hypothyroidism 2/19/2016   • Irregular heart beat 10/22/2019   • Stroke (HCC)     TIA at age 33       Family History   Problem Relation Age of Onset   • Hypertension Mother    • Heart Disease Mother         chf   • Lung Disease Father    • Hypertension Sister    • Hyperlipidemia Sister    • Hypertension Brother        Social History     Socioeconomic History   •  "Marital status:      Spouse name: Not on file   • Number of children: Not on file   • Years of education: Not on file   • Highest education level: Not on file   Occupational History   • Not on file   Social Needs   • Financial resource strain: Not on file   • Food insecurity:     Worry: Not on file     Inability: Not on file   • Transportation needs:     Medical: Not on file     Non-medical: Not on file   Tobacco Use   • Smoking status: Never Smoker   • Smokeless tobacco: Never Used   Substance and Sexual Activity   • Alcohol use: Yes     Alcohol/week: 8.4 oz     Types: 14 Glasses of wine per week     Frequency: 2-3 times a week     Drinks per session: 1 or 2     Comment: 2 glasses of wine daily   • Drug use: No   • Sexual activity: Yes     Partners: Male   Lifestyle   • Physical activity:     Days per week: Not on file     Minutes per session: Not on file   • Stress: Not on file   Relationships   • Social connections:     Talks on phone: Not on file     Gets together: Not on file     Attends [employer] service: Not on file     Active member of club or organization: Not on file     Attends meetings of clubs or organizations: Not on file     Relationship status: Not on file   • Intimate partner violence:     Fear of current or ex partner: Not on file     Emotionally abused: Not on file     Physically abused: Not on file     Forced sexual activity: Not on file   Other Topics Concern   • Not on file   Social History Narrative    Retired from [employer].  Lives with her .  Having some hearing concerns.  Walking without walking assistance, mentally and physically.  Independent. No social or domestic concerns.          Objective:     /50 (BP Location: Right arm, Patient Position: Sitting, BP Cuff Size: Adult)   Pulse 64   Temp 36.3 °C (97.3 °F) (Temporal)   Resp 16   Ht 1.549 m (5' 1\")   Wt 56.1 kg (123 lb 9.6 oz)   SpO2 99%   BMI 23.35 kg/m²      Physical Exam  Vitals signs reviewed. "   Constitutional:       General: She is not in acute distress.     Appearance: Normal appearance. She is well-developed and normal weight. She is not diaphoretic.   HENT:      Head: Normocephalic and atraumatic.      Nose: Nose normal. No congestion.      Mouth/Throat:      Mouth: Mucous membranes are moist.      Pharynx: Oropharynx is clear.   Eyes:      Conjunctiva/sclera: Conjunctivae normal.      Pupils: Pupils are equal, round, and reactive to light.   Neck:      Musculoskeletal: Normal range of motion and neck supple.   Cardiovascular:      Rate and Rhythm: Normal rate and regular rhythm.      Pulses: Normal pulses.      Heart sounds: No friction rub. No gallop.    Pulmonary:      Effort: Pulmonary effort is normal.      Breath sounds: Normal breath sounds.   Abdominal:      General: Abdomen is flat. Bowel sounds are normal. There is no distension.      Palpations: There is no mass.      Tenderness: There is no tenderness. There is no right CVA tenderness, left CVA tenderness or guarding.   Musculoskeletal:         General: No swelling or tenderness.   Skin:     General: Skin is warm.      Coloration: Skin is not jaundiced.      Findings: No erythema or rash.   Neurological:      General: No focal deficit present.      Mental Status: She is alert and oriented to person, place, and time.      Cranial Nerves: No cranial nerve deficit.      Coordination: Coordination normal.               Laboratory results reviewed: d/w Pt  Lab Results   Component Value Date/Time    CREATININE 1.11 11/26/2019 01:17 PM    POTASSIUM 4.5 11/26/2019 01:17 PM       Assessment/Plan:     1. CKD (chronic kidney disease), stage III      creat level  - to monitor      2. Essential hypertension      BP under better control now - continue current treatment    3. Vit D def      Low vit D level - improved -continue supplementation      elevated PTH - slightly better -continue vit D    4. Anemia : Hb level WNL    5. Gout; uric acid well  controlled - stopped allopurinol      To monitor      Recs:              Continue current medications, low Na diet, monitor BP             F/u in 4 months with BMP, vit D, PTH, uric acid

## 2019-12-05 ENCOUNTER — HOSPITAL ENCOUNTER (OUTPATIENT)
Dept: RADIOLOGY | Facility: MEDICAL CENTER | Age: 79
End: 2019-12-05
Attending: INTERNAL MEDICINE
Payer: MEDICARE

## 2019-12-05 DIAGNOSIS — G45.9 TIA (TRANSIENT ISCHEMIC ATTACK): ICD-10-CM

## 2019-12-05 DIAGNOSIS — I49.9 IRREGULAR HEART BEAT: ICD-10-CM

## 2019-12-05 DIAGNOSIS — N18.30 CKD (CHRONIC KIDNEY DISEASE), STAGE III (HCC): ICD-10-CM

## 2019-12-05 DIAGNOSIS — Z91.89 10 YEAR RISK OF MI OR STROKE 7.5% OR GREATER: ICD-10-CM

## 2019-12-05 DIAGNOSIS — E78.5 DYSLIPIDEMIA: ICD-10-CM

## 2019-12-05 DIAGNOSIS — I10 ESSENTIAL HYPERTENSION: ICD-10-CM

## 2019-12-05 LAB
LV EJECT FRACT  99904: 70
LV EJECT FRACT MOD 2C 99903: 71.61
LV EJECT FRACT MOD 4C 99902: 65.42
LV EJECT FRACT MOD BP 99901: 68.9

## 2019-12-05 PROCEDURE — 93978 VASCULAR STUDY: CPT | Mod: 26 | Performed by: INTERNAL MEDICINE

## 2019-12-05 PROCEDURE — 93975 VASCULAR STUDY: CPT

## 2019-12-05 PROCEDURE — 93880 EXTRACRANIAL BILAT STUDY: CPT | Mod: 26 | Performed by: INTERNAL MEDICINE

## 2019-12-05 RX ORDER — LOSARTAN POTASSIUM 100 MG/1
TABLET ORAL
Qty: 90 TAB | Refills: 1 | Status: SHIPPED | OUTPATIENT
Start: 2019-12-05 | End: 2020-06-06

## 2019-12-05 NOTE — TELEPHONE ENCOUNTER
Refill X 6 months, sent to pharmacy.Pt. Seen in the last 6 months per protocol.   Lab Results   Component Value Date/Time    SODIUM 143 11/26/2019 01:17 PM    POTASSIUM 4.5 11/26/2019 01:17 PM    CHLORIDE 108 11/26/2019 01:17 PM    CO2 27 11/26/2019 01:17 PM    GLUCOSE 81 11/26/2019 01:17 PM    BUN 18 11/26/2019 01:17 PM    CREATININE 1.11 11/26/2019 01:17 PM

## 2019-12-16 ENCOUNTER — NON-PROVIDER VISIT (OUTPATIENT)
Dept: CARDIOLOGY | Facility: MEDICAL CENTER | Age: 79
End: 2019-12-16
Payer: MEDICARE

## 2019-12-16 ENCOUNTER — TELEPHONE (OUTPATIENT)
Dept: CARDIOLOGY | Facility: MEDICAL CENTER | Age: 79
End: 2019-12-16

## 2019-12-16 DIAGNOSIS — I49.9 IRREGULAR HEART BEAT: ICD-10-CM

## 2019-12-16 DIAGNOSIS — I47.19 ATRIAL TACHYCARDIA (HCC): ICD-10-CM

## 2020-01-06 PROCEDURE — 0298T PR EXT ECG > 48HR TO 21 DAY REVIEW AND INTERPRETATN: CPT | Performed by: INTERNAL MEDICINE

## 2020-01-06 PROCEDURE — 0296T PR EXT ECG > 48HR TO 21 DAY RCRD W/CONECT INTL RCRD: CPT | Performed by: INTERNAL MEDICINE

## 2020-01-07 ENCOUNTER — HOSPITAL ENCOUNTER (OUTPATIENT)
Dept: LAB | Facility: MEDICAL CENTER | Age: 80
End: 2020-01-07
Attending: INTERNAL MEDICINE
Payer: MEDICARE

## 2020-01-09 ENCOUNTER — HOSPITAL ENCOUNTER (OUTPATIENT)
Dept: LAB | Facility: MEDICAL CENTER | Age: 80
End: 2020-01-09
Attending: INTERNAL MEDICINE
Payer: MEDICARE

## 2020-01-09 PROCEDURE — 83835 ASSAY OF METANEPHRINES: CPT

## 2020-01-09 PROCEDURE — 82530 CORTISOL FREE: CPT

## 2020-01-09 PROCEDURE — 82088 ASSAY OF ALDOSTERONE: CPT

## 2020-01-09 PROCEDURE — 84244 ASSAY OF RENIN: CPT

## 2020-01-09 PROCEDURE — 36415 COLL VENOUS BLD VENIPUNCTURE: CPT

## 2020-01-12 LAB
ALDOST SERPL-MCNC: 16.9 NG/DL
RENIN PLAS-CCNC: 60.2 NG/ML/HR

## 2020-01-13 LAB
ANNOTATION COMMENT IMP: NORMAL
COLLECT DURATION TIME SPEC: 24 HRS
CORTIS F 24H UR HPLC-MCNC: 3.95 UG/L
CORTIS F 24H UR-MRATE: 4.6 UG/D
CORTIS F/CREAT 24H UR: 7.05 UG/G CRT
CREAT 24H UR-MCNC: 56 MG/DL
CREAT 24H UR-MRATE: 658 MG/D (ref 500–1400)
METANEPH 24H UR-MCNC: 54 UG/L
METANEPH 24H UR-MRATE: 63 UG/D (ref 36–229)
METANEPH/CREAT 24H UR: 96 UG/G CRT (ref 0–300)
NORMETANEPHRINE 24H UR-MCNC: 192 UG/L
NORMETANEPHRINE 24H UR-MRATE: 226 UG/D (ref 95–650)
NORMETANEPHRINE/CREAT 24H UR: 343 UG/G CRT (ref 0–400)
SPECIMEN VOL ?TM UR: 1175 ML

## 2020-01-14 ENCOUNTER — OFFICE VISIT (OUTPATIENT)
Dept: CARDIOLOGY | Facility: MEDICAL CENTER | Age: 80
End: 2020-01-14
Payer: MEDICARE

## 2020-01-14 VITALS
HEIGHT: 61 IN | DIASTOLIC BLOOD PRESSURE: 56 MMHG | OXYGEN SATURATION: 98 % | HEART RATE: 82 BPM | WEIGHT: 120.37 LBS | SYSTOLIC BLOOD PRESSURE: 122 MMHG | BODY MASS INDEX: 22.73 KG/M2

## 2020-01-14 DIAGNOSIS — Z91.89 10 YEAR RISK OF MI OR STROKE 7.5% OR GREATER: ICD-10-CM

## 2020-01-14 DIAGNOSIS — I49.9 IRREGULAR HEART BEAT: ICD-10-CM

## 2020-01-14 DIAGNOSIS — E78.5 DYSLIPIDEMIA: ICD-10-CM

## 2020-01-14 DIAGNOSIS — I10 ESSENTIAL HYPERTENSION: ICD-10-CM

## 2020-01-14 PROCEDURE — 99214 OFFICE O/P EST MOD 30 MIN: CPT | Performed by: INTERNAL MEDICINE

## 2020-01-14 ASSESSMENT — ENCOUNTER SYMPTOMS
SHORTNESS OF BREATH: 0
PND: 0
DECREASED APPETITE: 0
ALTERED MENTAL STATUS: 0
CONSTIPATION: 0
FEVER: 0
DIARRHEA: 0
IRREGULAR HEARTBEAT: 0
DIZZINESS: 0
BACK PAIN: 0
DYSPNEA ON EXERTION: 0
HEARTBURN: 0
SYNCOPE: 0
ABDOMINAL PAIN: 0
WEIGHT LOSS: 0
NAUSEA: 0
FLANK PAIN: 0
NEAR-SYNCOPE: 0
ORTHOPNEA: 0
WEIGHT GAIN: 0
COUGH: 0
BLURRED VISION: 0
VOMITING: 0
PALPITATIONS: 0
CLAUDICATION: 0
DEPRESSION: 0

## 2020-01-14 NOTE — PROGRESS NOTES
Cardiology Note    Chief Complaint   Patient presents with   • Follow-Up     hypertension       History of Present Illness: Danielle Jon is a 79 y.o. female PMH HTN, HLD, hypothyroid, CKD who presents for follow up.    Referred for BP management, now much improved. Adjusted dosing. Now home readings 120s/60s. Prior, worse SBP in 200s. Goes for walks and uses stationary bike. Denies cardiac symptoms.     Review of Systems   Constitution: Negative for decreased appetite, fever, malaise/fatigue, weight gain and weight loss.   HENT: Negative for congestion and nosebleeds.    Eyes: Negative for blurred vision.   Cardiovascular: Negative for chest pain, claudication, dyspnea on exertion, irregular heartbeat, leg swelling, near-syncope, orthopnea, palpitations, paroxysmal nocturnal dyspnea and syncope.   Respiratory: Negative for cough and shortness of breath.    Endocrine: Negative for cold intolerance and heat intolerance.   Skin: Negative for rash.   Musculoskeletal: Negative for back pain.   Gastrointestinal: Negative for abdominal pain, constipation, diarrhea, heartburn, melena, nausea and vomiting.   Genitourinary: Negative for dysuria, flank pain and hematuria.   Neurological: Negative for dizziness.   Psychiatric/Behavioral: Negative for altered mental status and depression.         Past Medical History:   Diagnosis Date   • Cataract    • CKD (chronic kidney disease), stage III (HCC) 11/29/2017   • Essential hypertension 2/19/2016   • Gout of both feet 6/6/2017   • Hypercalcemia 12/14/2017   • Hypertriglyceridemia 4/4/2017   • Hypothyroidism 2/19/2016   • Irregular heart beat 10/22/2019   • Stroke (HCC)     TIA at age 33         Past Surgical History:   Procedure Laterality Date   • CATARACT EXTRACTION WITH IOL Bilateral 2014   • EYE SURGERY      lasix    • MAMMOPLASTY REDUCTION           Current Outpatient Medications   Medication Sig Dispense Refill   • losartan (COZAAR) 100 MG Tab TAKE ONE TABLET BY MOUTH  EVERY DAY 90 Tab 1   • hydroCHLOROthiazide (HYDRODIURIL) 25 MG Tab Take 1 Tab by mouth every day. 90 Tab 3   • amLODIPine (NORVASC) 10 MG Tab Take 1 Tab by mouth every day. 90 Tab 3   • atorvastatin (LIPITOR) 20 MG Tab Take 1 Tab by mouth every bedtime. 90 Tab 1   • magnesium oxide (MAG-OX) 400 MG Tab Take 400 mg by mouth every day.     • carbamide peroxide (CARBAMOXIDE EAR DROPS) 6.5 % Solution Place 6 Drops in ear 2 times a day. Administer drops in both ears. 1 Bottle 1   • levothyroxine (SYNTHROID) 50 MCG Tab TAKE 1 TABLET BY MOUTH EVERY MORNING ON AN EMPTY STOMACH 90 Tab 2   • Acetaminophen (TYLENOL 8 HOUR PO) Take  by mouth.     • Cetirizine HCl 10 MG Cap Take  by mouth.     • aspirin 81 MG tablet Take 81 mg by mouth every day.     • Cholecalciferol (VITAMIN D3) 1000 UNITS Cap Take 2,000 Units by mouth every day.     • cyanocobalamin (VITAMIN B-12) 500 MCG Tab Take 500 mcg by mouth every day.       No current facility-administered medications for this visit.          Allergies   Allergen Reactions   • Niacin Rash and Swelling     Rash/itching/swelling         Family History   Problem Relation Age of Onset   • Hypertension Mother    • Heart Disease Mother         chf   • Lung Disease Father    • Hypertension Sister    • Hyperlipidemia Sister    • Hypertension Brother          Social History     Socioeconomic History   • Marital status:      Spouse name: Not on file   • Number of children: Not on file   • Years of education: Not on file   • Highest education level: Not on file   Occupational History   • Not on file   Social Needs   • Financial resource strain: Not on file   • Food insecurity:     Worry: Not on file     Inability: Not on file   • Transportation needs:     Medical: Not on file     Non-medical: Not on file   Tobacco Use   • Smoking status: Never Smoker   • Smokeless tobacco: Never Used   Substance and Sexual Activity   • Alcohol use: Yes     Alcohol/week: 8.4 oz     Types: 14 Glasses of wine  "per week     Frequency: 2-3 times a week     Drinks per session: 1 or 2     Comment: 2 glasses of wine daily   • Drug use: No   • Sexual activity: Yes     Partners: Male   Lifestyle   • Physical activity:     Days per week: Not on file     Minutes per session: Not on file   • Stress: Not on file   Relationships   • Social connections:     Talks on phone: Not on file     Gets together: Not on file     Attends [employer] service: Not on file     Active member of club or organization: Not on file     Attends meetings of clubs or organizations: Not on file     Relationship status: Not on file   • Intimate partner violence:     Fear of current or ex partner: Not on file     Emotionally abused: Not on file     Physically abused: Not on file     Forced sexual activity: Not on file   Other Topics Concern   • Not on file   Social History Narrative    Retired from [employer].  Lives with her .  Having some hearing concerns.  Walking without walking assistance, mentally and physically.  Independent. No social or domestic concerns.         Physical Exam:  Ambulatory Vitals  /56 (BP Location: Left arm, Patient Position: Sitting, BP Cuff Size: Adult)   Pulse 82   Ht 1.549 m (5' 1\")   Wt 54.6 kg (120 lb 5.9 oz)   SpO2 98%    BP Readings from Last 4 Encounters:   01/14/20 122/56   12/04/19 118/50   12/02/19 154/78   11/21/19 140/80     Weight/BMI:   Vitals:    01/14/20 1020   BP: 122/56   Weight: 54.6 kg (120 lb 5.9 oz)   Height: 1.549 m (5' 1\")    Body mass index is 22.74 kg/m².  Wt Readings from Last 4 Encounters:   01/14/20 54.6 kg (120 lb 5.9 oz)   12/04/19 56.1 kg (123 lb 9.6 oz)   12/02/19 55.7 kg (122 lb 14.5 oz)   11/21/19 56.5 kg (124 lb 9.6 oz)       Physical Exam   Constitutional: She is oriented to person, place, and time and well-developed, well-nourished, and in no distress. No distress.   HENT:   Head: Normocephalic and atraumatic.   Eyes: Pupils are equal, round, and reactive to light. Conjunctivae are " normal.   Neck: Normal range of motion. Neck supple. No JVD present. Carotid bruit is not present.   Cardiovascular: Normal rate, regular rhythm, normal heart sounds and intact distal pulses. Exam reveals no gallop and no friction rub.   No murmur heard.  Pulmonary/Chest: Effort normal and breath sounds normal. No respiratory distress. She has no wheezes. She has no rales. She exhibits no tenderness.   Abdominal: Soft. Bowel sounds are normal. She exhibits no distension.   Musculoskeletal:         General: No edema.   Neurological: She is alert and oriented to person, place, and time.   Skin: Skin is warm and dry.   Psychiatric: Affect and judgment normal.       Lab Data Review:  Lab Results   Component Value Date/Time    CHOLSTRLTOT 192 11/14/2019 08:26 AM     (H) 11/14/2019 08:26 AM    HDL 58 11/14/2019 08:26 AM    TRIGLYCERIDE 104 11/14/2019 08:26 AM       Lab Results   Component Value Date/Time    SODIUM 143 11/26/2019 01:17 PM    POTASSIUM 4.5 11/26/2019 01:17 PM    CHLORIDE 108 11/26/2019 01:17 PM    CO2 27 11/26/2019 01:17 PM    GLUCOSE 81 11/26/2019 01:17 PM    BUN 18 11/26/2019 01:17 PM    CREATININE 1.11 11/26/2019 01:17 PM     CrCl cannot be calculated (Patient's most recent lab result is older than the maximum 7 days allowed.).  Lab Results   Component Value Date/Time    ALKPHOSPHAT 45 11/14/2019 08:26 AM    ASTSGOT 16 11/14/2019 08:26 AM    ALTSGPT 12 11/14/2019 08:26 AM    TBILIRUBIN 1.0 11/14/2019 08:26 AM      Lab Results   Component Value Date/Time    WBC 5.7 11/26/2019 01:17 PM     No results found for: HBA1C  No components found for: TROP      Cardiac Imaging and Procedures Review:      Renal artery us 12/2019  CONCLUSIONS   Velocities and waveforms are normal through the bilateral renal arteries..    No evidence of abdominal aortic aneurysm.     Carotid u/s 12/2019  CONCLUSIONS   No prior study is available for comparison..    Normal bilateral carotid exam.     TTE 12/2019  CONCLUSIONS  No  prior study is available for comparison.   Normal left ventricular systolic function.  Left ventricular ejection fraction is visually estimated to be 70%.  Normal diastolic function.  The right ventricle was normal in size and function.  No significant valve disease or flow abnormalities.     zio monitor 1/2020  Underlying rhythm: Predominantly sinus. Lowest HR 40 bpm occurring overnight. Average heart rate 71 bpm.  Atrial events: Occasional supraventricular ectopy. Short runs of asymptomatic atrial tachycardia.  Ventricular events: Rare ventricular ectopy.  Patient events: Triggered events showing sinus.    Medical Decision Making:  Problem List Items Addressed This Visit     Essential hypertension     Much better controlled. Goal <130/80. Cont current regimen.          Irregular heart beat     Resolved.         Dyslipidemia     Cont statin.         10 year risk of MI or stroke 7.5% or greater     Cont asa, cont statin. Recheck lipids with next blood draw from PCP.               It was my pleasure to meet with Ms. Jon.

## 2020-01-15 ENCOUNTER — HOSPITAL ENCOUNTER (OUTPATIENT)
Dept: LAB | Facility: MEDICAL CENTER | Age: 80
End: 2020-01-15
Attending: FAMILY MEDICINE
Payer: MEDICARE

## 2020-01-15 DIAGNOSIS — N18.30 CKD (CHRONIC KIDNEY DISEASE), STAGE III: ICD-10-CM

## 2020-01-15 DIAGNOSIS — Z51.81 MEDICATION MONITORING ENCOUNTER: ICD-10-CM

## 2020-01-15 DIAGNOSIS — G25.0 ESSENTIAL TREMOR: ICD-10-CM

## 2020-01-15 LAB
ALBUMIN SERPL BCP-MCNC: 4.5 G/DL (ref 3.2–4.9)
ALBUMIN/GLOB SERPL: 1.8 G/DL
ALP SERPL-CCNC: 46 U/L (ref 30–99)
ALT SERPL-CCNC: 17 U/L (ref 2–50)
ANION GAP SERPL CALC-SCNC: 9 MMOL/L (ref 0–11.9)
AST SERPL-CCNC: 18 U/L (ref 12–45)
BILIRUB SERPL-MCNC: 0.9 MG/DL (ref 0.1–1.5)
BUN SERPL-MCNC: 21 MG/DL (ref 8–22)
CALCIUM SERPL-MCNC: 10.6 MG/DL (ref 8.5–10.5)
CHLORIDE SERPL-SCNC: 103 MMOL/L (ref 96–112)
CO2 SERPL-SCNC: 30 MMOL/L (ref 20–33)
CREAT SERPL-MCNC: 1.3 MG/DL (ref 0.5–1.4)
GLOBULIN SER CALC-MCNC: 2.5 G/DL (ref 1.9–3.5)
GLUCOSE SERPL-MCNC: 92 MG/DL (ref 65–99)
POTASSIUM SERPL-SCNC: 3.9 MMOL/L (ref 3.6–5.5)
PROT SERPL-MCNC: 7 G/DL (ref 6–8.2)
SODIUM SERPL-SCNC: 142 MMOL/L (ref 135–145)
URATE SERPL-MCNC: 8.3 MG/DL (ref 1.9–8.2)

## 2020-01-15 PROCEDURE — 80053 COMPREHEN METABOLIC PANEL: CPT

## 2020-01-15 PROCEDURE — 84550 ASSAY OF BLOOD/URIC ACID: CPT

## 2020-01-15 PROCEDURE — 36415 COLL VENOUS BLD VENIPUNCTURE: CPT

## 2020-01-22 ENCOUNTER — OFFICE VISIT (OUTPATIENT)
Dept: MEDICAL GROUP | Facility: PHYSICIAN GROUP | Age: 80
End: 2020-01-22
Payer: MEDICARE

## 2020-01-22 VITALS
HEIGHT: 61 IN | BODY MASS INDEX: 22.28 KG/M2 | DIASTOLIC BLOOD PRESSURE: 62 MMHG | TEMPERATURE: 98 F | HEART RATE: 85 BPM | OXYGEN SATURATION: 98 % | SYSTOLIC BLOOD PRESSURE: 124 MMHG | RESPIRATION RATE: 14 BRPM | WEIGHT: 118 LBS

## 2020-01-22 DIAGNOSIS — E21.1 HYPERPARATHYROIDISM DUE TO VITAMIN D DEFICIENCY (HCC): ICD-10-CM

## 2020-01-22 DIAGNOSIS — Z51.81 MEDICATION MONITORING ENCOUNTER: ICD-10-CM

## 2020-01-22 DIAGNOSIS — R53.83 OTHER FATIGUE: ICD-10-CM

## 2020-01-22 DIAGNOSIS — I10 ESSENTIAL HYPERTENSION: ICD-10-CM

## 2020-01-22 DIAGNOSIS — M10.9 GOUT OF BOTH FEET: ICD-10-CM

## 2020-01-22 DIAGNOSIS — E61.1 IRON DEFICIENCY: ICD-10-CM

## 2020-01-22 DIAGNOSIS — E03.9 HYPOTHYROIDISM, UNSPECIFIED TYPE: ICD-10-CM

## 2020-01-22 DIAGNOSIS — N18.30 CKD (CHRONIC KIDNEY DISEASE), STAGE III: ICD-10-CM

## 2020-01-22 PROCEDURE — 99214 OFFICE O/P EST MOD 30 MIN: CPT | Performed by: FAMILY MEDICINE

## 2020-01-22 RX ORDER — LEVOTHYROXINE SODIUM 0.05 MG/1
50 TABLET ORAL EVERY MORNING
Qty: 90 TAB | Refills: 0 | Status: SHIPPED | OUTPATIENT
Start: 2020-01-22 | End: 2020-02-19 | Stop reason: SDUPTHER

## 2020-01-22 RX ORDER — ALLOPURINOL 100 MG/1
100 TABLET ORAL DAILY
Qty: 90 TAB | Refills: 0 | Status: SHIPPED | OUTPATIENT
Start: 2020-01-22 | End: 2020-02-19 | Stop reason: SDUPTHER

## 2020-01-22 ASSESSMENT — PATIENT HEALTH QUESTIONNAIRE - PHQ9: CLINICAL INTERPRETATION OF PHQ2 SCORE: 0

## 2020-01-23 NOTE — PROGRESS NOTES
cc: Stage III kidney disease stable, gout with increased uric acid level, hypertension stable, chronic fatigue    Subjective:     Danielle Jon is a 79 y.o. female presenting he did increase her cholesterol to atorvastatin 20 mg and she is doing the 81 mg of aspirin and she is doing well with this.  And her blood pressure is better under control with her 3 blood pressure medication she is now taking losartan 100 mg daily, hydrochlorothiazide 25 mg daily and amlodipine 10 mg daily.  Her blood pressure logs at home have improved to the 120s over 60 range.  She is not having any chest pain or problems breathing.  She is not having any racing of her heart.  She did get her lab work done.  And she did see the nephrologist and the cardiologist.  She did start take the magnesium and has been taking her vitamin B12.  For her stage III kidney disease she did see the nephrologist and they are also monitoring her vitamin D and PTH as this was elevated so they want her to continue the vitamin D but her anemia has improved and they want to see her back in 4 months.  She did see the cardiologist for the irregular heartbeat which resolved and they did a zio patch heart monitor and they did an echo and ultrasound and everything was within normal limits and they want to see her back in 1 year or earlier if there are any heart concerns.  January 15 she did do her lab work and her CMP showed normal potassium and normal creatinine her calcium was mildly elevated as 10.6 but rest of it was within normal limits, her uric acid did increase to 8.3, her kidney GFR did decline to 40 from 48.  No more gouty attacks but her uric acid did go up and she has had gouty attacks in the past and she has been having cherry juice which also has been helping prevent gouty attacks.    Review of systems:     Constitutional: Negative for fever, chills and positive fatigue.   HENT: Negative for sinus pressure  Eyes: Negative for  blurriness  Respiratory: Negative for cough and shortness of breath, negative for exertional shortness of breath  Cardiovascular: Negative for leg swelling, negative for palpitations, negative for chest pain  Gastrointestinal: Negative for nausea, vomiting, abdominal pain, constipation and diarrhea.  Genitourinary: Negative for dysuria and hematuria.   Neurological: Negative for dizziness, focal weakness and headaches.   Endo/Heme/Allergies: Denies bleeding, bruising, and recurrent allergies.  Psychiatric/Behavioral: Negative for depression and anxiety.        Current Outpatient Medications:   •  allopurinol (ZYLOPRIM) 100 MG Tab, Take 1 Tab by mouth every day., Disp: 90 Tab, Rfl: 0  •  levothyroxine (SYNTHROID) 50 MCG Tab, Take 1 Tab by mouth every morning. ON A EMPTY STOMACH, Disp: 90 Tab, Rfl: 0  •  losartan (COZAAR) 100 MG Tab, TAKE ONE TABLET BY MOUTH EVERY DAY, Disp: 90 Tab, Rfl: 1  •  hydroCHLOROthiazide (HYDRODIURIL) 25 MG Tab, Take 1 Tab by mouth every day., Disp: 90 Tab, Rfl: 3  •  amLODIPine (NORVASC) 10 MG Tab, Take 1 Tab by mouth every day., Disp: 90 Tab, Rfl: 3  •  atorvastatin (LIPITOR) 20 MG Tab, Take 1 Tab by mouth every bedtime., Disp: 90 Tab, Rfl: 1  •  magnesium oxide (MAG-OX) 400 MG Tab, Take 400 mg by mouth every day., Disp: , Rfl:   •  carbamide peroxide (CARBAMOXIDE EAR DROPS) 6.5 % Solution, Place 6 Drops in ear 2 times a day. Administer drops in both ears., Disp: 1 Bottle, Rfl: 1  •  Acetaminophen (TYLENOL 8 HOUR PO), Take  by mouth., Disp: , Rfl:   •  Cetirizine HCl 10 MG Cap, Take  by mouth., Disp: , Rfl:   •  aspirin 81 MG tablet, Take 81 mg by mouth every day., Disp: , Rfl:   •  Cholecalciferol (VITAMIN D3) 1000 UNITS Cap, Take 2,000 Units by mouth every day., Disp: , Rfl:   •  cyanocobalamin (VITAMIN B-12) 500 MCG Tab, Take 500 mcg by mouth every day., Disp: , Rfl:     Allergies, past medical history, past surgical history, family history, social history reviewed and  "updated    Objective:     Vitals: /62 (BP Location: Left arm, Patient Position: Sitting, BP Cuff Size: Adult)   Pulse 85   Temp 36.7 °C (98 °F) (Temporal)   Resp 14   Ht 1.549 m (5' 1\")   Wt 53.5 kg (118 lb)   SpO2 98%   BMI 22.30 kg/m²   General: Alert, pleasant, NAD  HEENT: Normocephalic.  Nontraumatic. EOMI, no icterus or pallor.  Conjunctivae and lids normal. External ears normal. Oropharynx non-erythematous, mucous membranes moist.  Neck supple.  No thyromegaly or masses palpated. No cervical or supraclavicular lymphadenopathy.  Heart: Regular rate and rhythm.  S1 and S2 normal.  No murmurs appreciated.  Respiratory: Normal respiratory effort.  Clear to auscultation bilaterally.  Skin: Warm, dry, no rashes.  Musculoskeletal: Gait is normal.  Moves all extremities well.  Extremities: No leg edema.  Psych:  Affect/mood is normal, judgement is good, memory is intact, grooming is appropriate.    Assessment/Plan:     Diagnoses and all orders for this visit:    CKD (chronic kidney disease), stage III (HCC)    Gout of both feet  -     allopurinol (ZYLOPRIM) 100 MG Tab; Take 1 Tab by mouth every day.    Medication monitoring encounter    Essential hypertension    Iron deficiency  -     FERRITIN; Future  -     IRON/TOTAL IRON BIND; Future  -     CBC WITH DIFFERENTIAL; Future    Other fatigue  -     FERRITIN; Future  -     IRON/TOTAL IRON BIND; Future  -     CBC WITH DIFFERENTIAL; Future  -     TSH WITH REFLEX TO FT4; Future    Hyperparathyroidism due to vitamin D deficiency (HCC)    Hypothyroidism, unspecified type  -     TSH WITH REFLEX TO FT4; Future  -     levothyroxine (SYNTHROID) 50 MCG Tab; Take 1 Tab by mouth every morning. ON A EMPTY STOMACH    -January 15 she did do her lab work and her CMP showed normal potassium and normal creatinine her calcium was mildly elevated as 10.6 but rest of it was within normal limits, her uric acid did increase to 8.3, her kidney GFR did decline to 40 from " 48.  -Please read patient instruction section on how to prevent chronic kidney disease as she still in stage III and she will see the nephrologist every 4 months who is following her parathyroid 2 which is from vitamin D deficiency so they have increased her vitamin D to 2000.  So please reduce your salt drink enough water and avoid medication such as Advil that could damage her kidneys and we will also be monitoring your medications as some of them can be affecting your kidneys.  Her blood pressure is better well controlled on her 3 blood pressure medications hydrochlorothiazide 25 mg, amlodipine 10 mg, and losartan 100 mg.  Please continue to check your blood pressure once in a while once a week the top number bottom number and heart rate and make sure it is below 130s over 90s but above 100/60 because if your blood pressure goes too low then we may need to reduce your blood pressure medication also due to the chronic fatigue we will check her CBC, iron and TSH all other labs have been checked and her uric acid did go up again for history of gout we will start allopurinol 100 mg daily but we will only do low dose and caution with this use because of her kidney function so we do not want to do too high dose and damage her kidneys so we will continue to monitor this.  And continue to do yoga breathing and stretching and meditation and get enough sleep and we will see you back in about 4 weeks to go over your lab work and see how everything is going with your blood pressure and your fatigue and with the gout and the new allopurinol medication which she has been on in the past but was taken off as her uric acid went down we want her uric acid to be around 4 to prevent any gouty attacks.  Return in about 4 weeks (around 2/19/2020), or FU labs/fatigue/gout.

## 2020-01-23 NOTE — PATIENT INSTRUCTIONS
Diagnoses and all orders for this visit:    CKD (chronic kidney disease), stage III (HCC)    Gout of both feet  -     allopurinol (ZYLOPRIM) 100 MG Tab; Take 1 Tab by mouth every day.    Medication monitoring encounter    Essential hypertension    Iron deficiency  -     FERRITIN; Future  -     IRON/TOTAL IRON BIND; Future  -     CBC WITH DIFFERENTIAL; Future    Other fatigue  -     FERRITIN; Future  -     IRON/TOTAL IRON BIND; Future  -     CBC WITH DIFFERENTIAL; Future  -     TSH WITH REFLEX TO FT4; Future    Hyperparathyroidism due to vitamin D deficiency (HCC)    Hypothyroidism, unspecified type  -     TSH WITH REFLEX TO FT4; Future  -     levothyroxine (SYNTHROID) 50 MCG Tab; Take 1 Tab by mouth every morning. ON A EMPTY STOMACH    -January 15 she did do her lab work and her CMP showed normal potassium and normal creatinine her calcium was mildly elevated as 10.6 but rest of it was within normal limits, her uric acid did increase to 8.3, her kidney GFR did decline to 40 from 48.  -Please read patient instruction section on how to prevent chronic kidney disease as she still in stage III and she will see the nephrologist every 4 months who is following her parathyroid 2 which is from vitamin D deficiency so they have increased her vitamin D to 2000.  So please reduce your salt drink enough water and avoid medication such as Advil that could damage her kidneys and we will also be monitoring your medications as some of them can be affecting your kidneys.  Her blood pressure is better well controlled on her 3 blood pressure medications hydrochlorothiazide 25 mg, amlodipine 10 mg, and losartan 100 mg.  Please continue to check your blood pressure once in a while once a week the top number bottom number and heart rate and make sure it is below 130s over 90s but above 100/60 because if your blood pressure goes too low then we may need to reduce your blood pressure medication also due to the chronic fatigue we will  check her CBC, iron and TSH all other labs have been checked and her uric acid did go up again for history of gout we will start allopurinol 100 mg daily but we will only do low dose and caution with this use because of her kidney function so we do not want to do too high dose and damage her kidneys so we will continue to monitor this.  And continue to do yoga breathing and stretching and meditation and get enough sleep and we will see you back in about 4 weeks to go over your lab work and see how everything is going with your blood pressure and your fatigue and with the gout and the new allopurinol medication which she has been on in the past but was taken off as her uric acid went down we want her uric acid to be around 4 to prevent any gouty attacks.  Return in about 4 weeks (around 2/19/2020), or FU labs/fatigue/gout.    Food Basics for Chronic Kidney Disease  When your kidneys are not working well, they cannot remove waste and excess substances from your blood as effectively as they did before. This can lead to a buildup and imbalance of these substances, which can affect how your body functions. This buildup can also make your kidneys work harder, causing even more damage.  You may need to eat less of certain foods that can lead to the buildup of these substances in your body. By making the changes to your diet that are recommended by your dietitian or health care provider, you could possibly help prevent further kidney damage and delay or prevent the need for dialysis.  The following information can help give you a basic understanding of these substances and how they affect your bodily functions. The information also gives examples of foods that contain the highest amounts of these substances.  WHAT DO I NEED TO KNOW ABOUT SUBSTANCES IN MY FOOD THAT I MAY NEED TO ADJUST?  Food adjustments will be different for each person with chronic kidney disease. It is important that you see a dietitian who can help you  determine the specific adjustments that you will need to make for each of the following substances:  Potassium   Potassium affects how steadily your heart beats. If too much potassium builds up in your blood, it can cause an irregular heartbeat or even a heart attack.  Examples of foods rich in potassium include:  · Milk.  · Fruits.  · Vegetables.  Phosphorus   Phosphorus is a mineral found in your bones. A balance between calcium and phosphorous is needed to build and maintain healthy bones. Too much phosphorus pulls calcium from your bones. This can make your bones weak and more likely to break. Too much phosphorus can also make your skin itch.  Examples of foods rich in phosphorus include:  · Milk and cheese.  · Dried beans.  · Peas.  · Kenn.  · Nuts and peanut butter.  Animal Protein   Animal protein helps you make and keep muscle. It also helps in the repair of your body's cells and tissues. One of the natural breakdown products of protein is a waste product called urea. When your kidneys are not working properly, they cannot remove wastes such as urea like they did before you developed chronic kidney disease. You will likely need to limit the amount of protein you eat to help prevent a buildup of urea in your blood.  Examples of animal protein include:  · Meat (all types).  · Fish and seafood.  · Poultry.  · Eggs.  Sodium   Sodium, which is found in salt, helps maintain a healthy balance of fluids in your body. Too much sodium can increase your blood pressure level and have a negative affect on the function of your heart and lungs. Too much sodium also can cause your body to retain too much fluid, making your kidneys work harder. Examples of foods with high levels of sodium include:  · Salt seasonings.  · Soy sauce.  · Cured and processed meats.  · Salted crackers and snack foods.  · Fast food.  · Canned soups and most canned foods.  Glucose   Glucose provides energy for your body. If you have diabetes  mellitus that is not properly controlled, you have too much glucose in your blood. Too much glucose in your blood can worsen the function of your kidneys by damaging small blood vessels. This prevents enough blood flow to your kidneys to give them what they need to work. If you have diabetes mellitus and chronic kidney disease, it is important to maintain your blood glucose at a level recommended by your health care provider.  SHOULD I TAKE A VITAMIN AND MINERAL SUPPLEMENT?  Because you may need to avoid eating certain foods, you may not get all of the vitamins and minerals that would normally come from those foods. Your health care provider or dietitian may recommend that you take a supplement to ensure that you get all of the vitamins and minerals that your body needs.   This information is not intended to replace advice given to you by your health care provider. Make sure you discuss any questions you have with your health care provider.  Document Released: 03/09/2004 Document Revised: 01/08/2016 Document Reviewed: 11/14/2014  [employer] Interactive Patient Education © 2017 [employer]    Preventing Chronic Kidney Disease  Chronic kidney disease (CKD) occurs when the kidneys are damaged for at least 3 months and do not function effectively. The kidneys are two organs that do many important jobs in the body, including:  · Removing waste and extra fluids from the blood.  · Regulating hormones, blood pressure, and blood chemistry.  At first, you may not notice any signs or symptoms of CKD. However, CKD gets worse over time (is progressive). You can prevent CKD or keep CKD from progressing by making certain changes to your lifestyle and nutrition.  Risk factors for CKD include:  · Being age 60 or older.  · Being female.  · Having any of the following:  ¨ Diabetes.  ¨ High blood pressure.  ¨ Heart disease.  ¨ An autoimmune disease.  ¨ Frequent urinary tract infections.  ¨ Polycystic kidney disease.  ¨ A family history  of kidney disease, heart disease, diabetes, or high blood pressure.  · Having problems with urine flow that may be caused by:  ¨ Cancer.  ¨ Having kidney stones more than once.  ¨ An enlarged prostate in males.  · Being of -American, , , , or  descent.  · Being obese.  · Long-term use of NSAIDs.  · Current or former tobacco use.  What types of nutrition changes can I make?  A balanced meal plan can help keep your kidneys healthy and prevent CKD. A balanced meal plan may involve:  · Limiting salt (sodium) intake. You should have less than 1 tsp (2,300 mg) of sodium per day. If you have heart disease or high blood pressure, you should have less than ¾ tsp (1,500 mg) of sodium per day.  · Limiting alcohol intake to no more than 1 drink a day for nonpregnant women and 2 drinks a day for men. One drink equals 12 oz of beer, 5 oz of wine, or 1½ oz of hard liquor.  If you have diabetes, work with a nutrition specialist (registered dietitian) or a certified diabetes educator to develop a healthy eating plan.  What types of lifestyle changes can I make?  Lifestyle changes that can help prevent CKD include:  · Talking with your health care provider about how much fluid you should drink each day.  · Working with your health care provider to manage your blood pressure. This includes healthy eating, regular exercise, and taking any medicines that are prescribed for you.  · Exercising for at least 30 minutes on five or more days of the week, or as much as told by your health care provider.  · Keeping your weight at a healthy level. If you are overweight or obese, lose weight as told by your health care provider.  · Not smoking or using any tobacco products, such as cigarettes, chewing tobacco, and e-cigarettes. If you need help quitting, ask your health care provider.  · Having a yearly physical exam.  · Learning about your family's medical history. Talk to your relatives and  siblings about diabetes, heart disease, and high blood pressure.  · Using NSAIDs for pain only when necessary. Ask your health care provider about other pain medicines that do not increase your risk of developing CKD.  If you have diabetes, managing your diabetes with a healthy meal plan and physical activity can help prevent CKD. Work with your health care provider to manage your condition.  What can happen if changes are not made?  If you do not make lifestyle and nutrition changes to protect your kidneys, you may develop CKD, which can lead to:  · A low red blood cell count (anemia).  · Heart disease.  · Weak bones.  · Nerve damage (neuropathy).  · Stroke.  · Kidney failure and dialysis.  What can I do to lower my risk?  Talk to your health care provider about your kidney health and your risk factors for CKD. The most important steps to take to lower your risk of CKD are:  · Getting high blood pressure down to the target that your health care provider recommends.  · Getting blood sugar (glucose) levels down to the target that your health care provider recommends.  · Eating less sodium.  What are my treatment options for CKD?     Treatment for CKD may include:  · Medicines that:  ¨ Control high blood pressure, such as ACE inhibitors.  ¨ Control blood glucose levels.  ¨ Control cholesterol levels.  ¨ Help your body get rid of excess water (diuretics).  ¨ Help protect your bones.  · Lifestyle changes. You may need to:  ¨ Eat less salt.  ¨ Eat less protein.  ¨ Follow a heart-healthy meal plan.  ¨ Quit smoking.  ¨ Avoid phosphorus. Phosphorous is found in dark-colored sodas and canned ice teas.  ¨ Avoid potassium. Potassium is found in some juices, especially orange juice.  ¨ Avoid alcohol.  · Dialysis. This is when a machine filters your blood if your kidney fails.  · Kidney transplant, if your kidney fails.  Where can I get more information?  Learn more about CKD and how to prevent CKD from:  · The National Kidney  Foundation: www.kidney.org  · The American Association of Kidney Patients: www.aakp.org  · The American Diabetes Association: www.diabetes.org  Summary  You may not notice symptoms of CKD until your kidneys have already been damaged. The best way to prevent kidney damage is to know your risk factors and make nutrition and lifestyle changes before you develop symptoms of CKD.  This information is not intended to replace advice given to you by your health care provider. Make sure you discuss any questions you have with your health care provider.  Document Released: 01/13/2017 Document Revised: 08/30/2017 Document Reviewed: 11/14/2016  [employer] Interactive Patient Education © 2017 [employer]

## 2020-01-24 ENCOUNTER — TELEPHONE (OUTPATIENT)
Dept: MEDICAL GROUP | Facility: PHYSICIAN GROUP | Age: 80
End: 2020-01-24

## 2020-01-24 NOTE — TELEPHONE ENCOUNTER
1. Caller Name: Danielle Jon                                           Call Back Number: [phone] (home)         Patient approves a detailed voicemail message: N\A    Pt had appointment 01/22/2020 and was told to follow up in 4 weeks around 02/19/2020.  You have no appointments available then and pt wants to see you.  Please advise

## 2020-01-27 NOTE — TELEPHONE ENCOUNTER
Please fit the patient in before or after that date (4 weeks) preferably after lunch in a 20 min spot. Please ask her to get her lab work done a week before this appointment and to bring all her current medication bottles to this appointment and anything else if we discussed from her last appointment. Thanks!

## 2020-02-05 ENCOUNTER — PATIENT MESSAGE (OUTPATIENT)
Dept: MEDICAL GROUP | Facility: PHYSICIAN GROUP | Age: 80
End: 2020-02-05

## 2020-02-06 ENCOUNTER — TELEPHONE (OUTPATIENT)
Dept: CARDIOLOGY | Facility: MEDICAL CENTER | Age: 80
End: 2020-02-06

## 2020-02-06 DIAGNOSIS — I10 ESSENTIAL HYPERTENSION: ICD-10-CM

## 2020-02-06 DIAGNOSIS — I47.19 ATRIAL TACHYCARDIA (HCC): ICD-10-CM

## 2020-02-06 DIAGNOSIS — N18.30 CKD (CHRONIC KIDNEY DISEASE), STAGE III: ICD-10-CM

## 2020-02-06 RX ORDER — CHLORTHALIDONE 50 MG/1
50 TABLET ORAL DAILY
Qty: 90 TAB | Refills: 3 | Status: SHIPPED | OUTPATIENT
Start: 2020-02-06 | End: 2021-01-21

## 2020-02-06 NOTE — PATIENT COMMUNICATION
Please call and let the patient know that swelling can be by increased salt and to elevate your legs and wear compression stockings but it could also be from amlodipine but this is not a new medication you have been taking it but it can be from the amlodipine but your blood pressure was well controlled on this medication so if this is causing any concerns then please make a follow-up appointment unfortunately I am booked until I leave in 3 weeks but you could try to see if I have a cancellation or go to urgent care or see another provider if this has been bothering you but we would like your blood pressures to be well controlled but we could always have an appointment to see if we need to decrease your amlodipine and increase another medication and if this helps please do not do this by yourself until you are seen.  Thanks.

## 2020-02-06 NOTE — TELEPHONE ENCOUNTER
VR    Pt called re: amlodipine. Last week, her ankles/feet started swelling & thinks it may be due to this med. #[phone]    *see patient's msg to Dr. Christiansen*

## 2020-02-06 NOTE — TELEPHONE ENCOUNTER
Called pt. Amlodipine was started in October and then increased in December. Three days ago she noticed swelling her ankles and feet, which she has never noticed before. BP has been 115-120s/50s-70s. She denies any dietary or fluid intake changes.     To VR

## 2020-02-07 NOTE — TELEPHONE ENCOUNTER
Called pt and discussed recommended med changes and repeat lab work. She will stop amlodipine and HCTZ, start chlorthalidone 50mg daily, keep track of BP/HR, and call us with an update in one week. She will also have labs drawn in 1 week.     Josef Weldon M.D.  You 13 minutes ago (4:16 PM)      Yea it could. It is unusual. We can stop amlodipine. Lets switch HCTZ to chlorthalidone 50mg. Can then increase to 100mg if BP still above 130/80. She should also have a BMP repeated 1 week after changing medication. Thank you!    Routing comment       You  Josef Weldon M.D. 1 hour ago (2:52 PM)      Do you think amlodipine would start causing swelling this long after it was started? Do you recommend any changes?

## 2020-02-12 ENCOUNTER — HOSPITAL ENCOUNTER (OUTPATIENT)
Dept: LAB | Facility: MEDICAL CENTER | Age: 80
End: 2020-02-12
Attending: FAMILY MEDICINE
Payer: MEDICARE

## 2020-02-12 DIAGNOSIS — E61.1 IRON DEFICIENCY: ICD-10-CM

## 2020-02-12 DIAGNOSIS — E03.9 HYPOTHYROIDISM, UNSPECIFIED TYPE: ICD-10-CM

## 2020-02-12 DIAGNOSIS — R53.83 OTHER FATIGUE: ICD-10-CM

## 2020-02-12 LAB
BASOPHILS # BLD AUTO: 0.7 % (ref 0–1.8)
BASOPHILS # BLD: 0.05 K/UL (ref 0–0.12)
EOSINOPHIL # BLD AUTO: 0.08 K/UL (ref 0–0.51)
EOSINOPHIL NFR BLD: 1.2 % (ref 0–6.9)
ERYTHROCYTE [DISTWIDTH] IN BLOOD BY AUTOMATED COUNT: 45.3 FL (ref 35.9–50)
HCT VFR BLD AUTO: 40.5 % (ref 37–47)
HGB BLD-MCNC: 13.5 G/DL (ref 12–16)
IMM GRANULOCYTES # BLD AUTO: 0.01 K/UL (ref 0–0.11)
IMM GRANULOCYTES NFR BLD AUTO: 0.1 % (ref 0–0.9)
IRON SATN MFR SERPL: 33 % (ref 15–55)
IRON SERPL-MCNC: 97 UG/DL (ref 40–170)
LYMPHOCYTES # BLD AUTO: 1.38 K/UL (ref 1–4.8)
LYMPHOCYTES NFR BLD: 20.6 % (ref 22–41)
MCH RBC QN AUTO: 32.9 PG (ref 27–33)
MCHC RBC AUTO-ENTMCNC: 33.3 G/DL (ref 33.6–35)
MCV RBC AUTO: 98.8 FL (ref 81.4–97.8)
MONOCYTES # BLD AUTO: 0.44 K/UL (ref 0–0.85)
MONOCYTES NFR BLD AUTO: 6.6 % (ref 0–13.4)
NEUTROPHILS # BLD AUTO: 4.74 K/UL (ref 2–7.15)
NEUTROPHILS NFR BLD: 70.8 % (ref 44–72)
NRBC # BLD AUTO: 0 K/UL
NRBC BLD-RTO: 0 /100 WBC
PLATELET # BLD AUTO: 282 K/UL (ref 164–446)
PMV BLD AUTO: 10.8 FL (ref 9–12.9)
RBC # BLD AUTO: 4.1 M/UL (ref 4.2–5.4)
TIBC SERPL-MCNC: 297 UG/DL (ref 250–450)
WBC # BLD AUTO: 6.7 K/UL (ref 4.8–10.8)

## 2020-02-12 PROCEDURE — 36415 COLL VENOUS BLD VENIPUNCTURE: CPT

## 2020-02-12 PROCEDURE — 85025 COMPLETE CBC W/AUTO DIFF WBC: CPT

## 2020-02-12 PROCEDURE — 83550 IRON BINDING TEST: CPT

## 2020-02-12 PROCEDURE — 83540 ASSAY OF IRON: CPT

## 2020-02-12 PROCEDURE — 82728 ASSAY OF FERRITIN: CPT

## 2020-02-12 PROCEDURE — 84443 ASSAY THYROID STIM HORMONE: CPT

## 2020-02-13 LAB
FERRITIN SERPL-MCNC: 139.3 NG/ML (ref 10–291)
TSH SERPL DL<=0.005 MIU/L-ACNC: 1.05 UIU/ML (ref 0.38–5.33)

## 2020-02-19 ENCOUNTER — OFFICE VISIT (OUTPATIENT)
Dept: MEDICAL GROUP | Facility: PHYSICIAN GROUP | Age: 80
End: 2020-02-19
Payer: MEDICARE

## 2020-02-19 VITALS
HEART RATE: 75 BPM | WEIGHT: 120.8 LBS | DIASTOLIC BLOOD PRESSURE: 62 MMHG | RESPIRATION RATE: 14 BRPM | TEMPERATURE: 98.4 F | SYSTOLIC BLOOD PRESSURE: 116 MMHG | OXYGEN SATURATION: 98 % | HEIGHT: 61 IN | BODY MASS INDEX: 22.81 KG/M2

## 2020-02-19 DIAGNOSIS — Z51.81 MEDICATION MONITORING ENCOUNTER: ICD-10-CM

## 2020-02-19 DIAGNOSIS — G25.0 ESSENTIAL TREMOR: ICD-10-CM

## 2020-02-19 DIAGNOSIS — I10 ESSENTIAL HYPERTENSION: ICD-10-CM

## 2020-02-19 DIAGNOSIS — H61.21 IMPACTED CERUMEN, RIGHT EAR: ICD-10-CM

## 2020-02-19 DIAGNOSIS — E03.9 HYPOTHYROIDISM, UNSPECIFIED TYPE: ICD-10-CM

## 2020-02-19 DIAGNOSIS — M10.9 GOUT OF BOTH FEET: ICD-10-CM

## 2020-02-19 DIAGNOSIS — E78.5 DYSLIPIDEMIA: ICD-10-CM

## 2020-02-19 DIAGNOSIS — N18.30 CKD (CHRONIC KIDNEY DISEASE), STAGE III: ICD-10-CM

## 2020-02-19 PROBLEM — H61.22 IMPACTED CERUMEN OF LEFT EAR: Status: RESOLVED | Noted: 2019-10-22 | Resolved: 2020-02-19

## 2020-02-19 PROCEDURE — 99214 OFFICE O/P EST MOD 30 MIN: CPT | Performed by: FAMILY MEDICINE

## 2020-02-19 RX ORDER — ATORVASTATIN CALCIUM 20 MG/1
20 TABLET, FILM COATED ORAL
Qty: 90 TAB | Refills: 0 | Status: SHIPPED | OUTPATIENT
Start: 2020-02-19 | End: 2020-07-16

## 2020-02-19 RX ORDER — LEVOTHYROXINE SODIUM 0.05 MG/1
50 TABLET ORAL EVERY MORNING
Qty: 90 TAB | Refills: 0 | Status: SHIPPED | OUTPATIENT
Start: 2020-02-19 | End: 2020-09-14

## 2020-02-19 RX ORDER — ALLOPURINOL 100 MG/1
100 TABLET ORAL DAILY
Qty: 90 TAB | Refills: 0 | Status: SHIPPED | OUTPATIENT
Start: 2020-02-19 | End: 2020-07-16

## 2020-02-19 NOTE — PROGRESS NOTES
cc: Hypertension, medication monitoring, gout, chronic kidney disease stage III    Subjective:     Danielle Jon is a 79 y.o. female presenting she was on amlodipine 10 mg which was increased from 5 mg December 2 from cardiology and increased dose of amlodipine seem to increase her leg swelling.  So she stopped amlodipine 10 mg February 6 after talking with her cardiologist and they gave her a new medication to try chlorthalidone 50 mg.  Otherwise I last saw her January 22 and she did do her lab work and we did talk about preventing chronic kidney disease and she is in stage III and she sees the nephrologist every 4 months who is following her parathyroid hormone and vitamin D.  She is reducing her salt and drink enough water and avoiding ibuprofen and Advil.  Her hydrochlorothiazide 25 mg was also stopped along with amlodipine 10 mg.  Her blood pressure she has been checking at home since the medication change and it is not going too high or too low and below 130s over 90s but above 100/60.  She did start allopurinol 100 mg for the increased uric acid and history of gout and this is also helped her leg pain and she is not getting any more foot pain either.  She is taking the over-the-counter eardrops.  For her blood pressure she is only taking chlorthalidone 50 mg once daily and losartan 100 mg daily.  She still doing the levothyroxine 50 mcg for her thyroid and atorvastatin 20 mg for her cholesterol and allopurinol 100 mg for her gout.  February 12 she did lab work and her CBC is within normal limits, TSH thyroid within normal limits, ferritin within normal limits and iron panel within normal limits.  Her fatigue has also improved and most of it appears due to stress from caregiver activities which has now improved and her stress has improved and she is feeling much better.    Review of systems:     Constitutional: Negative for fever, chills and negative fatigue.   HENT: Negative for sinus pressure  Eyes:  Negative for blurriness  Respiratory: Negative for cough and shortness of breath, negative for exertional shortness of breath  Cardiovascular: Negative for leg swelling, negative for palpitations, negative for chest pain  Gastrointestinal: Negative for nausea, vomiting, abdominal pain, constipation and diarrhea.  Genitourinary: Negative for dysuria and hematuria.   Skin: Negative for rash.   Neurological: Negative for dizziness, focal weakness and headaches.   Endo/Heme/Allergies: Denies bleeding, bruising, and recurrent allergies.  Psychiatric/Behavioral: Negative for depression and anxiety.        Current Outpatient Medications:   •  levothyroxine (SYNTHROID) 50 MCG Tab, Take 1 Tab by mouth every morning. ON A EMPTY STOMACH, Disp: 90 Tab, Rfl: 0  •  atorvastatin (LIPITOR) 20 MG Tab, Take 1 Tab by mouth every bedtime., Disp: 90 Tab, Rfl: 0  •  allopurinol (ZYLOPRIM) 100 MG Tab, Take 1 Tab by mouth every day., Disp: 90 Tab, Rfl: 0  •  chlorthalidone (HYGROTON) 50 MG Tab, Take 1 Tab by mouth every day., Disp: 90 Tab, Rfl: 3  •  losartan (COZAAR) 100 MG Tab, TAKE ONE TABLET BY MOUTH EVERY DAY, Disp: 90 Tab, Rfl: 1  •  magnesium oxide (MAG-OX) 400 MG Tab, Take 400 mg by mouth every day., Disp: , Rfl:   •  carbamide peroxide (CARBAMOXIDE EAR DROPS) 6.5 % Solution, Place 6 Drops in ear 2 times a day. Administer drops in both ears., Disp: 1 Bottle, Rfl: 1  •  Acetaminophen (TYLENOL 8 HOUR PO), Take  by mouth., Disp: , Rfl:   •  Cetirizine HCl 10 MG Cap, Take  by mouth., Disp: , Rfl:   •  cyanocobalamin (VITAMIN B-12) 500 MCG Tab, Take 500 mcg by mouth every day., Disp: , Rfl:   •  aspirin 81 MG tablet, Take 81 mg by mouth every day., Disp: , Rfl:   •  Cholecalciferol (VITAMIN D3) 1000 UNITS Cap, Take 2,000 Units by mouth every day., Disp: , Rfl:     Allergies, past medical history, past surgical history, family history, social history reviewed and updated    Objective:     Vitals: /62 (BP Location: Left arm, Patient  "Position: Sitting, BP Cuff Size: Adult)   Pulse 75   Temp 36.9 °C (98.4 °F) (Temporal)   Resp 14   Ht 1.549 m (5' 1\")   Wt 54.8 kg (120 lb 12.8 oz)   SpO2 98%   BMI 22.82 kg/m²   General: Alert, pleasant, NAD  HEENT: Normocephalic.  Nontraumatic. EOMI, no icterus or pallor.  Conjunctivae and lids normal. External ears normal.  Partial cerumen impaction in the right ear and left ear within normal limits and TM visualized within normal limits.  Heart: Regular rate and rhythm.  S1 and S2 normal.  No murmurs appreciated.  Respiratory: Normal respiratory effort.  Clear to auscultation bilaterally.  Skin: Warm, dry, no rashes.  Musculoskeletal: Gait is normal.  Moves all extremities well.  Extremities: No leg edema.  Pedal pulses 2+ symmetric.   Psych:  Affect/mood is normal, judgement is good, memory is intact, grooming is appropriate.    Assessment/Plan:     Diagnoses and all orders for this visit:    Essential hypertension  -     Basic Metabolic Panel; Future    CKD (chronic kidney disease), stage III (HCC)  -     Basic Metabolic Panel; Future    Gout of both feet  -     URIC ACID, SERUM  -     allopurinol (ZYLOPRIM) 100 MG Tab; Take 1 Tab by mouth every day.    Medication monitoring encounter  -     Basic Metabolic Panel; Future  -     URIC ACID, SERUM    Hypothyroidism, unspecified type  -     levothyroxine (SYNTHROID) 50 MCG Tab; Take 1 Tab by mouth every morning. ON A EMPTY STOMACH    Dyslipidemia  -     atorvastatin (LIPITOR) 20 MG Tab; Take 1 Tab by mouth every bedtime.    Essential tremor    Impacted cerumen, right ear    - For her blood pressure she is only taking chlorthalidone 50 mg once daily and losartan 100 mg daily.  She still doing the levothyroxine 50 mcg for her thyroid and atorvastatin 20 mg for her cholesterol and allopurinol 100 mg for her gout.  February 12 she did lab work and her CBC is within normal limits, TSH thyroid within normal limits, ferritin within normal limits and iron panel " within normal limits.  Her fatigue has also improved and most of it appears due to stress from caregiver activities which has now improved and her stress has improved and she is feeling much better.  -In about a week now that she has been on chlorthalidone for about a month please recheck your nonfasting uric acid and BMP to make sure your kidney function, sodium, potassium and uric acid is stable and if it is then we could recheck this every 6 months or as needed with your new provider for monitoring and due to the chronic kidney disease which she does see the nephrologist.  Her blood pressure is stable on her new medications and she will continue this for the goal of blood pressure less than 130s over 90s which she has acquired with the new medication.  Her thyroid is stable on levothyroxine 50 mcg which she will continue.  She will continue to see the cardiologist once a year for an annual unless she is having any issues she will return earlier.  She will also continue to see her nephrologist and her ophthalmologist and dentist.  Since her fatigue has improved along with the stress and her TSH was within normal limits and so was her CBC and iron she could recheck her TSH and lipids in about 6 months to see if the atorvastatin 20 mg is helping or as recommended with the new provider.  She will continue to take all her other vitamins and she will make an earlier follow-up if she is having any other issues otherwise she will follow-up with her new provider.  ER precautions given if any chest pain, shortness of breath, passing out, dizziness, strokelike symptoms then please go to the ER call 911 and patient instruction given on possible strokelike symptoms to watch out for and how to reduce your risk factors and also how to prevent the chronic kidney disease and patient instruction section.  She does have some mild essential tremors which are not too bothersome and she could read more about this and patient  instruction but I would avoid extra medication at this time due to her kidneys and she agrees about this as well since they are not as bothersome and they are only come and go intermittently.  For her right ear for the earwax she could use the eardrops for 1 or 2 weeks before she sees the new provider and they could do ear lavage at that time if she would like.    Return in about 3 months (around 5/19/2020), or establish care with new provider.

## 2020-02-19 NOTE — PATIENT INSTRUCTIONS
Diagnoses and all orders for this visit:    Essential hypertension  -     Basic Metabolic Panel; Future    CKD (chronic kidney disease), stage III (HCC)  -     Basic Metabolic Panel; Future    Gout of both feet  -     URIC ACID, SERUM  -     allopurinol (ZYLOPRIM) 100 MG Tab; Take 1 Tab by mouth every day.    Medication monitoring encounter  -     Basic Metabolic Panel; Future  -     URIC ACID, SERUM    Hypothyroidism, unspecified type  -     levothyroxine (SYNTHROID) 50 MCG Tab; Take 1 Tab by mouth every morning. ON A EMPTY STOMACH    Dyslipidemia  -     atorvastatin (LIPITOR) 20 MG Tab; Take 1 Tab by mouth every bedtime.    - For her blood pressure she is only taking chlorthalidone 50 mg once daily and losartan 100 mg daily.  She still doing the levothyroxine 50 mcg for her thyroid and atorvastatin 20 mg for her cholesterol and allopurinol 100 mg for her gout.  February 12 she did lab work and her CBC is within normal limits, TSH thyroid within normal limits, ferritin within normal limits and iron panel within normal limits.  Her fatigue has also improved and most of it appears due to stress from caregiver activities which has now improved and her stress has improved and she is feeling much better.  -In about a week now that she has been on chlorthalidone for about a month please recheck your nonfasting uric acid and BMP to make sure your kidney function, sodium, potassium and uric acid is stable and if it is then we could recheck this every 6 months or as needed with your new provider for monitoring and due to the chronic kidney disease which she does see the nephrologist.  Her blood pressure is stable on her new medications and she will continue this for the goal of blood pressure less than 130s over 90s which she has acquired with the new medication.  Her thyroid is stable on levothyroxine 50 mcg which she will continue.  She will continue to see the cardiologist once a year for an annual unless she is having  any issues she will return earlier.  She will also continue to see her nephrologist and her ophthalmologist and dentist.  Since her fatigue has improved along with the stress and her TSH was within normal limits and so was her CBC and iron she could recheck her TSH and lipids in about 6 months to see if the atorvastatin 20 mg is helping or as recommended with the new provider.  She will continue to take all her other vitamins and she will make an earlier follow-up if she is having any other issues otherwise she will follow-up with her new provider.  ER precautions given if any chest pain, shortness of breath, passing out, dizziness, strokelike symptoms then please go to the ER call 911 and patient instruction given on possible strokelike symptoms to watch out for and how to reduce your risk factors and also how to prevent the chronic kidney disease and patient instruction section. She does have some mild essential tremors which are not too bothersome and she could read more about this and patient instruction but I would avoid extra medication at this time due to her kidneys and she agrees about this as well since they are not as bothersome and they are only come and go intermittently.  For her right ear for the earwax she could use the eardrops for 1 or 2 weeks before she sees the new provider and they could do ear lavage at that time if she would like.    Return in about 3 months (around 5/19/2020), or establish care with new provider.    Stroke Prevention  Some medical conditions and behaviors are associated with an increased chance of having a stroke. You may prevent a stroke by making healthy choices and managing medical conditions.  How can I reduce my risk of having a stroke?  · Stay physically active. Get at least 30 minutes of activity on most or all days.  · Do not smoke. It may also be helpful to avoid exposure to secondhand smoke.  · Limit alcohol use. Moderate alcohol use is considered to be:  ¨ No more  than 2 drinks per day for men.  ¨ No more than 1 drink per day for nonpregnant women.  · Eat healthy foods. This involves:  ¨ Eating 5 or more servings of fruits and vegetables a day.  ¨ Making dietary changes that address high blood pressure (hypertension), high cholesterol, diabetes, or obesity.  · Manage your cholesterol levels.  ¨ Making food choices that are high in fiber and low in saturated fat, trans fat, and cholesterol may control cholesterol levels.  ¨ Take any prescribed medicines to control cholesterol as directed by your health care provider.  · Manage your diabetes.  ¨ Controlling your carbohydrate and sugar intake is recommended to manage diabetes.  ¨ Take any prescribed medicines to control diabetes as directed by your health care provider.  · Control your hypertension.  ¨ Making food choices that are low in salt (sodium), saturated fat, trans fat, and cholesterol is recommended to manage hypertension.  ¨ Ask your health care provider if you need treatment to lower your blood pressure. Take any prescribed medicines to control hypertension as directed by your health care provider.  ¨ If you are 18-39 years of age, have your blood pressure checked every 3-5 years. If you are 40 years of age or older, have your blood pressure checked every year.  · Maintain a healthy weight.  ¨ Reducing calorie intake and making food choices that are low in sodium, saturated fat, trans fat, and cholesterol are recommended to manage weight.  · Stop drug abuse.  · Avoid taking birth control pills.  ¨ Talk to your health care provider about the risks of taking birth control pills if you are over 35 years old, smoke, get migraines, or have ever had a blood clot.  · Get evaluated for sleep disorders (sleep apnea).  ¨ Talk to your health care provider about getting a sleep evaluation if you snore a lot or have excessive sleepiness.  · Take medicines only as directed by your health care provider.  ¨ For some people, aspirin  or blood thinners (anticoagulants) are helpful in reducing the risk of forming abnormal blood clots that can lead to stroke. If you have the irregular heart rhythm of atrial fibrillation, you should be on a blood thinner unless there is a good reason you cannot take them.  ¨ Understand all your medicine instructions.  · Make sure that other conditions (such as anemia or atherosclerosis) are addressed.  Get help right away if:  · You have sudden weakness or numbness of the face, arm, or leg, especially on one side of the body.  · Your face or eyelid droops to one side.  · You have sudden confusion.  · You have trouble speaking (aphasia) or understanding.  · You have sudden trouble seeing in one or both eyes.  · You have sudden trouble walking.  · You have dizziness.  · You have a loss of balance or coordination.  · You have a sudden, severe headache with no known cause.  · You have new chest pain or an irregular heartbeat.  Any of these symptoms may represent a serious problem that is an emergency. Do not wait to see if the symptoms will go away. Get medical help at once. Call your local emergency services (911 in [place] Do not drive yourself to the hospital.   This information is not intended to replace advice given to you by your health care provider. Make sure you discuss any questions you have with your health care provider.      Preventing Chronic Kidney Disease  Chronic kidney disease (CKD) occurs when the kidneys are damaged for at least 3 months and do not function effectively. The kidneys are two organs that do many important jobs in the body, including:  · Removing waste and extra fluids from the blood.  · Regulating hormones, blood pressure, and blood chemistry.  At first, you may not notice any signs or symptoms of CKD. However, CKD gets worse over time (is progressive). You can prevent CKD or keep CKD from progressing by making certain changes to your lifestyle and nutrition.  Risk factors for CKD  include:  · Being age 60 or older.  · Being female.  · Having any of the following:  ¨ Diabetes.  ¨ High blood pressure.  ¨ Heart disease.  ¨ An autoimmune disease.  ¨ Frequent urinary tract infections.  ¨ Polycystic kidney disease.  ¨ A family history of kidney disease, heart disease, diabetes, or high blood pressure.  · Having problems with urine flow that may be caused by:  ¨ Cancer.  ¨ Having kidney stones more than once.  ¨ An enlarged prostate in males.  · Being of -American, , , , or  descent.  · Being obese.  · Long-term use of NSAIDs.  · Current or former tobacco use.  What types of nutrition changes can I make?  A balanced meal plan can help keep your kidneys healthy and prevent CKD. A balanced meal plan may involve:  · Limiting salt (sodium) intake. You should have less than 1 tsp (2,300 mg) of sodium per day. If you have heart disease or high blood pressure, you should have less than ¾ tsp (1,500 mg) of sodium per day.  · Limiting alcohol intake to no more than 1 drink a day for nonpregnant women and 2 drinks a day for men. One drink equals 12 oz of beer, 5 oz of wine, or 1½ oz of hard liquor.  If you have diabetes, work with a nutrition specialist (registered dietitian) or a certified diabetes educator to develop a healthy eating plan.  What types of lifestyle changes can I make?  Lifestyle changes that can help prevent CKD include:  · Talking with your health care provider about how much fluid you should drink each day.  · Working with your health care provider to manage your blood pressure. This includes healthy eating, regular exercise, and taking any medicines that are prescribed for you.  · Exercising for at least 30 minutes on five or more days of the week, or as much as told by your health care provider.  · Keeping your weight at a healthy level. If you are overweight or obese, lose weight as told by your health care provider.  · Not smoking or  using any tobacco products, such as cigarettes, chewing tobacco, and e-cigarettes. If you need help quitting, ask your health care provider.  · Having a yearly physical exam.  · Learning about your family's medical history. Talk to your relatives and siblings about diabetes, heart disease, and high blood pressure.  · Using NSAIDs for pain only when necessary. Ask your health care provider about other pain medicines that do not increase your risk of developing CKD.  If you have diabetes, managing your diabetes with a healthy meal plan and physical activity can help prevent CKD. Work with your health care provider to manage your condition.  What can happen if changes are not made?  If you do not make lifestyle and nutrition changes to protect your kidneys, you may develop CKD, which can lead to:  · A low red blood cell count (anemia).  · Heart disease.  · Weak bones.  · Nerve damage (neuropathy).  · Stroke.  · Kidney failure and dialysis.  What can I do to lower my risk?  Talk to your health care provider about your kidney health and your risk factors for CKD. The most important steps to take to lower your risk of CKD are:  · Getting high blood pressure down to the target that your health care provider recommends.  · Getting blood sugar (glucose) levels down to the target that your health care provider recommends.  · Eating less sodium.  What are my treatment options for CKD?     Treatment for CKD may include:  · Medicines that:  ¨ Control high blood pressure, such as ACE inhibitors.  ¨ Control blood glucose levels.  ¨ Control cholesterol levels.  ¨ Help your body get rid of excess water (diuretics).  ¨ Help protect your bones.  · Lifestyle changes. You may need to:  ¨ Eat less salt.  ¨ Eat less protein.  ¨ Follow a heart-healthy meal plan.  ¨ Quit smoking.  ¨ Avoid phosphorus. Phosphorous is found in dark-colored sodas and canned ice teas.  ¨ Avoid potassium. Potassium is found in some juices, especially orange  juice.  ¨ Avoid alcohol.  · Dialysis. This is when a machine filters your blood if your kidney fails.  · Kidney transplant, if your kidney fails.  Where can I get more information?  Learn more about CKD and how to prevent CKD from:  · The National Kidney Foundation: www.kidney.org  · The American Association of Kidney Patients: www.aakp.org  · The American Diabetes Association: www.diabetes.org  Summary  You may not notice symptoms of CKD until your kidneys have already been damaged. The best way to prevent kidney damage is to know your risk factors and make nutrition and lifestyle changes before you develop symptoms of CKD.  This information is not intended to replace advice given to you by your health care provider. Make sure you discuss any questions you have with your health care provider.    Food Basics for Chronic Kidney Disease  When your kidneys are not working well, they cannot remove waste and excess substances from your blood as effectively as they did before. This can lead to a buildup and imbalance of these substances, which can affect how your body functions. This buildup can also make your kidneys work harder, causing even more damage.  You may need to eat less of certain foods that can lead to the buildup of these substances in your body. By making the changes to your diet that are recommended by your dietitian or health care provider, you could possibly help prevent further kidney damage and delay or prevent the need for dialysis.  The following information can help give you a basic understanding of these substances and how they affect your bodily functions. The information also gives examples of foods that contain the highest amounts of these substances.  WHAT DO I NEED TO KNOW ABOUT SUBSTANCES IN MY FOOD THAT I MAY NEED TO ADJUST?  Food adjustments will be different for each person with chronic kidney disease. It is important that you see a dietitian who can help you determine the specific  adjustments that you will need to make for each of the following substances:  Potassium   Potassium affects how steadily your heart beats. If too much potassium builds up in your blood, it can cause an irregular heartbeat or even a heart attack.  Examples of foods rich in potassium include:  · Milk.  · Fruits.  · Vegetables.  Phosphorus   Phosphorus is a mineral found in your bones. A balance between calcium and phosphorous is needed to build and maintain healthy bones. Too much phosphorus pulls calcium from your bones. This can make your bones weak and more likely to break. Too much phosphorus can also make your skin itch.  Examples of foods rich in phosphorus include:  · Milk and cheese.  · Dried beans.  · Peas.  · Kenn.  · Nuts and peanut butter.  Animal Protein   Animal protein helps you make and keep muscle. It also helps in the repair of your body's cells and tissues. One of the natural breakdown products of protein is a waste product called urea. When your kidneys are not working properly, they cannot remove wastes such as urea like they did before you developed chronic kidney disease. You will likely need to limit the amount of protein you eat to help prevent a buildup of urea in your blood.  Examples of animal protein include:  · Meat (all types).  · Fish and seafood.  · Poultry.  · Eggs.  Sodium   Sodium, which is found in salt, helps maintain a healthy balance of fluids in your body. Too much sodium can increase your blood pressure level and have a negative affect on the function of your heart and lungs. Too much sodium also can cause your body to retain too much fluid, making your kidneys work harder. Examples of foods with high levels of sodium include:  · Salt seasonings.  · Soy sauce.  · Cured and processed meats.  · Salted crackers and snack foods.  · Fast food.  · Canned soups and most canned foods.  Glucose   Glucose provides energy for your body. If you have diabetes mellitus that is not properly  controlled, you have too much glucose in your blood. Too much glucose in your blood can worsen the function of your kidneys by damaging small blood vessels. This prevents enough blood flow to your kidneys to give them what they need to work. If you have diabetes mellitus and chronic kidney disease, it is important to maintain your blood glucose at a level recommended by your health care provider.  SHOULD I TAKE A VITAMIN AND MINERAL SUPPLEMENT?  Because you may need to avoid eating certain foods, you may not get all of the vitamins and minerals that would normally come from those foods. Your health care provider or dietitian may recommend that you take a supplement to ensure that you get all of the vitamins and minerals that your body needs.   This information is not intended to replace advice given to you by your health care provider. Make sure you discuss any questions you have with your health care provider.  Document Released: 03/09/2004 Document Revised: 01/08/2016 Document Reviewed: 11/14/2014  [employer] Patient Education © 2017 [employer]    Essential Tremor  Introduction  A tremor is trembling or shaking that you cannot control. Most tremors affect the hands or arms. Tremors can also affect the head, vocal cords, face, and other parts of the body.  Essential tremor is a tremor without a known cause.  What are the causes?  Essential tremor has no known cause.  What increases the risk?  You may be at greater risk of essential tremor if:  · You have a family member with essential tremor.  · You are age 40 or older.  · You take certain medicines.  What are the signs or symptoms?  The main sign of a tremor is uncontrolled and unintentional rhythmic shaking of a body part.  · You may have difficulty eating with a spoon or fork.  · You may have difficulty writing.  · You may nod your head up and down or side to side.  · You may have a quivering voice.  Your tremors:  · May get worse over time.  · May  come and go.  · May be more noticeable on one side of your body.  · May get worse due to stress, fatigue, caffeine, and extreme heat or cold.  How is this diagnosed?  Your health care provider can diagnose essential tremor based on your symptoms, medical history, and a physical examination. There is no single test to diagnose an essential tremor. However, your health care provider may perform a variety of tests to rule out other conditions. Tests may include:  · Blood and urine tests.  · Imaging studies of your brain, such as:  ¨ CT scan.  ¨ MRI.  · A test that measures involuntary muscle movement (electromyogram).  How is this treated?  Your tremors may go away without treatment. Mild tremors may not need treatment if they do not affect your day-to-day life. Severe tremors may need to be treated using one or a combination of the following options:  · Medicines. This may include medicine that is injected.  · Lifestyle changes.  · Physical therapy.  Follow these instructions at home:  · Take medicines only as directed by your health care provider.  · Limit alcohol intake to no more than 1 drink per day for nonpregnant women and 2 drinks per day for men. One drink equals 12 oz of beer, 5 oz of wine, or 1½ oz of hard liquor.  · Do not use any tobacco products, including cigarettes, chewing tobacco, or electronic cigarettes. If you need help quitting, ask your health care provider.  · Take medicines only as directed by your health care provider.  · Avoid extreme heat or cold.  · Limit the amount of caffeine you consume as directed by your health care provider.  · Try to get eight hours of sleep each night.  · Find ways to manage your stress, such as meditation or yoga.  · Keep all follow-up visits as directed by your health care provider. This is important. This includes any physical therapy visits.  Contact a health care provider if:  · You experience any changes in the location or intensity of your tremors.  · You  start having a tremor after starting a new medicine.  · You have tremor with other symptoms such as:  ¨ Numbness.  ¨ Tingling.  ¨ Pain.  ¨ Weakness.  · Your tremor gets worse.  · Your tremor interferes with your daily life.  This information is not intended to replace advice given to you by your health care provider. Make sure you discuss any questions you have with your health care provider.  Document Released: 01/08/2016 Document Revised: 05/25/2017 Document Reviewed: 06/15/2015  © 2017 [employer]

## 2020-02-24 ENCOUNTER — HOSPITAL ENCOUNTER (OUTPATIENT)
Dept: LAB | Facility: MEDICAL CENTER | Age: 80
End: 2020-02-24
Attending: FAMILY MEDICINE
Payer: MEDICARE

## 2020-02-24 DIAGNOSIS — N18.30 CKD (CHRONIC KIDNEY DISEASE), STAGE III: ICD-10-CM

## 2020-02-24 DIAGNOSIS — I10 ESSENTIAL HYPERTENSION: ICD-10-CM

## 2020-02-24 DIAGNOSIS — Z51.81 MEDICATION MONITORING ENCOUNTER: ICD-10-CM

## 2020-02-24 LAB
ANION GAP SERPL CALC-SCNC: 9 MMOL/L (ref 0–11.9)
BUN SERPL-MCNC: 19 MG/DL (ref 8–22)
CALCIUM SERPL-MCNC: 10.6 MG/DL (ref 8.5–10.5)
CHLORIDE SERPL-SCNC: 96 MMOL/L (ref 96–112)
CO2 SERPL-SCNC: 29 MMOL/L (ref 20–33)
CREAT SERPL-MCNC: 1.22 MG/DL (ref 0.5–1.4)
GLUCOSE SERPL-MCNC: 95 MG/DL (ref 65–99)
POTASSIUM SERPL-SCNC: 2.8 MMOL/L (ref 3.6–5.5)
SODIUM SERPL-SCNC: 134 MMOL/L (ref 135–145)
URATE SERPL-MCNC: 5.8 MG/DL (ref 1.9–8.2)

## 2020-02-24 PROCEDURE — 36415 COLL VENOUS BLD VENIPUNCTURE: CPT

## 2020-02-24 PROCEDURE — 84550 ASSAY OF BLOOD/URIC ACID: CPT

## 2020-02-24 PROCEDURE — 80048 BASIC METABOLIC PNL TOTAL CA: CPT

## 2020-02-26 DIAGNOSIS — Z51.81 MEDICATION MONITORING ENCOUNTER: ICD-10-CM

## 2020-02-26 DIAGNOSIS — E87.6 HYPOKALEMIA: ICD-10-CM

## 2020-02-26 RX ORDER — POTASSIUM CHLORIDE 20 MEQ/1
20 TABLET, EXTENDED RELEASE ORAL 2 TIMES DAILY
Qty: 180 TAB | Refills: 0 | Status: SHIPPED | OUTPATIENT
Start: 2020-02-26 | End: 2020-07-14

## 2020-02-27 NOTE — PROGRESS NOTES
Please call patient and let patient know her potassium is really low which could be from her medications such as chlorthalidone so I am going to send 20 mEq of potassium to do twice daily and please keep doing this and 1 week before you see the new Dr. Acuna on April third please get nonfasting lab work of a BMP again to recheck your calcium and sodium and potassium and if it still decreased then we may need to change her chlorthalidone so please bring all your medications to that new appointment and your blood pressure logs and your blood pressure cuff if you want to compare to the reading in the clinic and I wish you all the best and was a pleasure to take care of you, thank you.  Result note sent to patient.

## 2020-04-23 ENCOUNTER — HOSPITAL ENCOUNTER (OUTPATIENT)
Dept: LAB | Facility: MEDICAL CENTER | Age: 80
End: 2020-04-23
Attending: INTERNAL MEDICINE
Payer: MEDICARE

## 2020-04-23 ENCOUNTER — HOSPITAL ENCOUNTER (OUTPATIENT)
Dept: LAB | Facility: MEDICAL CENTER | Age: 80
End: 2020-04-23
Attending: FAMILY MEDICINE
Payer: MEDICARE

## 2020-04-23 DIAGNOSIS — N18.30 CKD (CHRONIC KIDNEY DISEASE), STAGE III: ICD-10-CM

## 2020-04-23 DIAGNOSIS — Z51.81 MEDICATION MONITORING ENCOUNTER: ICD-10-CM

## 2020-04-23 DIAGNOSIS — E21.1 HYPERPARATHYROIDISM DUE TO VITAMIN D DEFICIENCY (HCC): ICD-10-CM

## 2020-04-23 DIAGNOSIS — E87.6 HYPOKALEMIA: ICD-10-CM

## 2020-04-23 LAB
25(OH)D3 SERPL-MCNC: 64 NG/ML (ref 30–100)
ANION GAP SERPL CALC-SCNC: 12 MMOL/L (ref 7–16)
ANION GAP SERPL CALC-SCNC: 15 MMOL/L (ref 7–16)
BUN SERPL-MCNC: 19 MG/DL (ref 8–22)
BUN SERPL-MCNC: 19 MG/DL (ref 8–22)
CALCIUM SERPL-MCNC: 10.2 MG/DL (ref 8.5–10.5)
CALCIUM SERPL-MCNC: 10.4 MG/DL (ref 8.5–10.5)
CHLORIDE SERPL-SCNC: 100 MMOL/L (ref 96–112)
CHLORIDE SERPL-SCNC: 98 MMOL/L (ref 96–112)
CO2 SERPL-SCNC: 27 MMOL/L (ref 20–33)
CO2 SERPL-SCNC: 27 MMOL/L (ref 20–33)
CREAT SERPL-MCNC: 1.04 MG/DL (ref 0.5–1.4)
CREAT SERPL-MCNC: 1.09 MG/DL (ref 0.5–1.4)
FASTING STATUS PATIENT QL REPORTED: NORMAL
FASTING STATUS PATIENT QL REPORTED: NORMAL
GLUCOSE SERPL-MCNC: 87 MG/DL (ref 65–99)
GLUCOSE SERPL-MCNC: 88 MG/DL (ref 65–99)
POTASSIUM SERPL-SCNC: 3.8 MMOL/L (ref 3.6–5.5)
POTASSIUM SERPL-SCNC: 4 MMOL/L (ref 3.6–5.5)
PTH-INTACT SERPL-MCNC: 90.2 PG/ML (ref 14–72)
SODIUM SERPL-SCNC: 139 MMOL/L (ref 135–145)
SODIUM SERPL-SCNC: 140 MMOL/L (ref 135–145)
URATE SERPL-MCNC: 4.8 MG/DL (ref 1.9–8.2)
URATE SERPL-MCNC: 4.9 MG/DL (ref 1.9–8.2)

## 2020-04-23 PROCEDURE — 82306 VITAMIN D 25 HYDROXY: CPT

## 2020-04-23 PROCEDURE — 84550 ASSAY OF BLOOD/URIC ACID: CPT | Mod: 91

## 2020-04-23 PROCEDURE — 84550 ASSAY OF BLOOD/URIC ACID: CPT

## 2020-04-23 PROCEDURE — 83970 ASSAY OF PARATHORMONE: CPT

## 2020-04-23 PROCEDURE — 80048 BASIC METABOLIC PNL TOTAL CA: CPT | Mod: 91

## 2020-04-23 PROCEDURE — 80048 BASIC METABOLIC PNL TOTAL CA: CPT

## 2020-04-23 PROCEDURE — 36415 COLL VENOUS BLD VENIPUNCTURE: CPT

## 2020-04-30 ENCOUNTER — OFFICE VISIT (OUTPATIENT)
Dept: NEPHROLOGY | Facility: MEDICAL CENTER | Age: 80
End: 2020-04-30
Payer: MEDICARE

## 2020-04-30 VITALS
HEART RATE: 67 BPM | RESPIRATION RATE: 14 BRPM | WEIGHT: 122 LBS | SYSTOLIC BLOOD PRESSURE: 128 MMHG | OXYGEN SATURATION: 100 % | BODY MASS INDEX: 23.03 KG/M2 | HEIGHT: 61 IN | TEMPERATURE: 99.1 F | DIASTOLIC BLOOD PRESSURE: 70 MMHG

## 2020-04-30 DIAGNOSIS — I10 ESSENTIAL HYPERTENSION: ICD-10-CM

## 2020-04-30 DIAGNOSIS — E21.1 HYPERPARATHYROIDISM DUE TO VITAMIN D DEFICIENCY (HCC): ICD-10-CM

## 2020-04-30 DIAGNOSIS — D64.9 ANEMIA, UNSPECIFIED TYPE: ICD-10-CM

## 2020-04-30 DIAGNOSIS — R80.9 MICROALBUMINURIA: ICD-10-CM

## 2020-04-30 DIAGNOSIS — E55.9 VITAMIN D DEFICIENCY: ICD-10-CM

## 2020-04-30 DIAGNOSIS — M10.9 GOUT OF BOTH FEET: ICD-10-CM

## 2020-04-30 DIAGNOSIS — N18.30 CKD (CHRONIC KIDNEY DISEASE), STAGE III: ICD-10-CM

## 2020-04-30 PROCEDURE — 99214 OFFICE O/P EST MOD 30 MIN: CPT | Performed by: INTERNAL MEDICINE

## 2020-04-30 ASSESSMENT — ENCOUNTER SYMPTOMS
ABDOMINAL PAIN: 0
SHORTNESS OF BREATH: 0
FEVER: 0
WHEEZING: 0
NAUSEA: 0
FLANK PAIN: 0
VOMITING: 0
WEIGHT LOSS: 0
SINUS PAIN: 0
HYPERTENSION: 1
PALPITATIONS: 0
ORTHOPNEA: 0
HEMOPTYSIS: 0
EYES NEGATIVE: 1
COUGH: 0
CHILLS: 0

## 2020-04-30 ASSESSMENT — FIBROSIS 4 INDEX: FIB4 SCORE: 1.21

## 2020-04-30 NOTE — PROGRESS NOTES
Subjective:      Danielle Jon is a 79 y.o. female who presents with Follow-Up and Chronic Kidney Disease            Chronic Kidney Disease   Pertinent negatives include no abdominal pain, chest pain, chills, congestion, coughing, fever, nausea or vomiting.   Hypertension   Pertinent negatives include no chest pain, malaise/fatigue, orthopnea, palpitations or shortness of breath.     Danielle is coming today for HTN, CKD III f/u  Doing well, no complaints  No difficulties to urinate  No dysuria/hematuria/flank pain  HTN: elevated BP improved -well controlled with Losartan and Hygroton  BP improved -well controlled now  CKD III - creat level improved to 1.0     Review of Systems   Constitutional: Negative for chills, fever, malaise/fatigue and weight loss.   HENT: Negative for congestion, hearing loss and sinus pain.    Eyes: Negative.    Respiratory: Negative for cough, hemoptysis, shortness of breath and wheezing.    Cardiovascular: Negative for chest pain, palpitations, orthopnea and leg swelling.   Gastrointestinal: Negative for abdominal pain, nausea and vomiting.   Genitourinary: Negative for dysuria, flank pain, frequency, hematuria and urgency.   Skin: Negative.    All other systems reviewed and are negative.    Past Medical History:   Diagnosis Date   • Cataract    • CKD (chronic kidney disease), stage III (HCC) 11/29/2017   • Essential hypertension 2/19/2016   • Gout of both feet 6/6/2017   • Hypercalcemia 12/14/2017   • Hypertriglyceridemia 4/4/2017   • Hypothyroidism 2/19/2016   • Irregular heart beat 10/22/2019   • Stroke (HCC)     TIA at age 33       Family History   Problem Relation Age of Onset   • Hypertension Mother    • Heart Disease Mother         chf   • Lung Disease Father    • Hypertension Sister    • Hyperlipidemia Sister    • Hypertension Brother        Social History     Socioeconomic History   • Marital status:      Spouse name: Not on file   • Number of children: Not on file   •  "Years of education: Not on file   • Highest education level: Not on file   Occupational History   • Not on file   Social Needs   • Financial resource strain: Not on file   • Food insecurity     Worry: Not on file     Inability: Not on file   • Transportation needs     Medical: Not on file     Non-medical: Not on file   Tobacco Use   • Smoking status: Never Smoker   • Smokeless tobacco: Never Used   Substance and Sexual Activity   • Alcohol use: Yes     Alcohol/week: 8.4 oz     Types: 14 Glasses of wine per week     Frequency: 2-3 times a week     Drinks per session: 1 or 2     Comment: 2 glasses of wine daily   • Drug use: No   • Sexual activity: Yes     Partners: Male   Lifestyle   • Physical activity     Days per week: Not on file     Minutes per session: Not on file   • Stress: Not on file   Relationships   • Social connections     Talks on phone: Not on file     Gets together: Not on file     Attends [employer] service: Not on file     Active member of club or organization: Not on file     Attends meetings of clubs or organizations: Not on file     Relationship status: Not on file   • Intimate partner violence     Fear of current or ex partner: Not on file     Emotionally abused: Not on file     Physically abused: Not on file     Forced sexual activity: Not on file   Other Topics Concern   • Not on file   Social History Narrative    Retired from [employer].  Lives with her .  Having some hearing concerns.  Walking without walking assistance, mentally and physically.  Independent. No social or domestic concerns.          Objective:     /70 (BP Location: Right arm, Patient Position: Sitting, BP Cuff Size: Adult)   Pulse 67   Temp 37.3 °C (99.1 °F) (Temporal)   Resp 14   Ht 1.549 m (5' 1\")   Wt 55.3 kg (122 lb)   SpO2 100%   BMI 23.05 kg/m²      Physical Exam  Vitals signs reviewed.   Constitutional:       General: She is not in acute distress.     Appearance: Normal appearance. She is " well-developed and normal weight. She is not diaphoretic.   HENT:      Head: Normocephalic and atraumatic.      Nose: Nose normal. No congestion.      Mouth/Throat:      Mouth: Mucous membranes are moist.      Pharynx: Oropharynx is clear.   Eyes:      General: No scleral icterus.     Extraocular Movements: Extraocular movements intact.      Conjunctiva/sclera: Conjunctivae normal.      Pupils: Pupils are equal, round, and reactive to light.   Neck:      Musculoskeletal: Normal range of motion and neck supple.   Cardiovascular:      Rate and Rhythm: Normal rate and regular rhythm.      Pulses: Normal pulses.      Heart sounds: No friction rub. No gallop.    Pulmonary:      Effort: Pulmonary effort is normal. No respiratory distress.      Breath sounds: Normal breath sounds. No wheezing, rhonchi or rales.   Abdominal:      General: Abdomen is flat. Bowel sounds are normal. There is no distension.      Palpations: There is no mass.      Tenderness: There is no abdominal tenderness. There is no right CVA tenderness, left CVA tenderness or guarding.   Musculoskeletal:         General: No swelling or tenderness.      Right lower leg: No edema.      Left lower leg: No edema.   Skin:     General: Skin is warm.      Coloration: Skin is not jaundiced.      Findings: No erythema or rash.   Neurological:      General: No focal deficit present.      Mental Status: She is alert and oriented to person, place, and time.      Cranial Nerves: No cranial nerve deficit.      Coordination: Coordination normal.   Psychiatric:         Mood and Affect: Mood normal.         Behavior: Behavior normal.         Thought Content: Thought content normal.         Judgment: Judgment normal.               Laboratory results reviewed: d/w Pt  Lab Results   Component Value Date/Time    CREATININE 1.04 04/23/2020 12:54 PM    POTASSIUM 4.0 04/23/2020 12:54 PM       Assessment/Plan:     1. CKD (chronic kidney disease), stage III      creat level  improved 1.0 - to monitor      2. Essential hypertension      BP well controlled - continue current treatment    3. Vit D def      Low vit D level - improved -continue supplementation      elevated PTH - improving -continue vit D    4. Anemia : Hb level WNL    5. Gout; uric acid well controlled with allopurinol      To monitor    6. Microalbuminuria mild -controlled with ARB  Recs:              Continue current medications, low Na diet, monitor BP             F/u in 4 months with BMP, vit D, PTH, UA, urine microalb/creat ratio

## 2020-06-04 DIAGNOSIS — I10 ESSENTIAL HYPERTENSION: ICD-10-CM

## 2020-06-06 RX ORDER — LOSARTAN POTASSIUM 100 MG/1
TABLET ORAL
Qty: 30 TAB | Refills: 0 | Status: SHIPPED | OUTPATIENT
Start: 2020-06-06 | End: 2020-07-13

## 2020-06-06 NOTE — TELEPHONE ENCOUNTER
Last seen by PCP 02/19/2020. Currently no PCP anymore.   Pt to make appt prior to more refills.    Has appt with Dr. Acuna.     Last Blood Pressure reading was 128/70 on 04/30/2020

## 2020-06-16 ENCOUNTER — OFFICE VISIT (OUTPATIENT)
Dept: MEDICAL GROUP | Facility: PHYSICIAN GROUP | Age: 80
End: 2020-06-16
Payer: MEDICARE

## 2020-06-16 VITALS
RESPIRATION RATE: 14 BRPM | TEMPERATURE: 98.3 F | SYSTOLIC BLOOD PRESSURE: 136 MMHG | HEIGHT: 61 IN | BODY MASS INDEX: 23 KG/M2 | OXYGEN SATURATION: 99 % | HEART RATE: 69 BPM | DIASTOLIC BLOOD PRESSURE: 68 MMHG | WEIGHT: 121.8 LBS

## 2020-06-16 DIAGNOSIS — E03.9 HYPOTHYROIDISM, UNSPECIFIED TYPE: Chronic | ICD-10-CM

## 2020-06-16 DIAGNOSIS — N18.30 CKD (CHRONIC KIDNEY DISEASE), STAGE III: Chronic | ICD-10-CM

## 2020-06-16 DIAGNOSIS — G45.9 TIA (TRANSIENT ISCHEMIC ATTACK): Chronic | ICD-10-CM

## 2020-06-16 DIAGNOSIS — E55.9 VITAMIN D DEFICIENCY: ICD-10-CM

## 2020-06-16 DIAGNOSIS — E78.5 DYSLIPIDEMIA: Chronic | ICD-10-CM

## 2020-06-16 DIAGNOSIS — M10.9 GOUT OF BOTH FEET: Chronic | ICD-10-CM

## 2020-06-16 DIAGNOSIS — I10 ESSENTIAL HYPERTENSION: Chronic | ICD-10-CM

## 2020-06-16 PROBLEM — E78.1 HYPERTRIGLYCERIDEMIA: Status: RESOLVED | Noted: 2017-04-04 | Resolved: 2020-06-16

## 2020-06-16 PROCEDURE — 99214 OFFICE O/P EST MOD 30 MIN: CPT | Performed by: INTERNAL MEDICINE

## 2020-06-16 ASSESSMENT — FIBROSIS 4 INDEX: FIB4 SCORE: 1.21

## 2020-06-16 NOTE — ASSESSMENT & PLAN NOTE
Chronic stable condition.  Her baseline GFR is in the 40s to 50s.  Patient is now followed by nephrologist.

## 2020-06-16 NOTE — ASSESSMENT & PLAN NOTE
Patient reported history of TIA noted at age 32.  No for the problem noted since that time she is currently taking aspirin 81 mg daily.

## 2020-06-16 NOTE — ASSESSMENT & PLAN NOTE
Chronic condition.  The patient presently taking chlorthalidone and losartan daily.  Patient brought in the blood pressure reading records and her blood pressure was noted to be in the normal range.  No side effect reported with current meds.

## 2020-06-16 NOTE — PROGRESS NOTES
CC: Establish care  Follow-up hypertension  Acacian review    HPI: This is a 79 y.o. Pt presents to [facility].   Pt's medical history is notable for:    Essential hypertension  Chronic condition.  The patient presently taking chlorthalidone and losartan daily.  Patient brought in the blood pressure reading records and her blood pressure was noted to be in the normal range.  No side effect reported with current meds.    Hypothyroidism  Chronic stable condition.  The patient take levothyroxine 50 MCG daily.    Gout of both feet  This has been a chronic condition.  Patient take allopurinol daily.  No flareup noted    CKD (chronic kidney disease), stage III (HCC)  Chronic stable condition.  Her baseline GFR is in the 40s to 50s.  Patient is now followed by nephrologist.    Dyslipidemia  Chronic stable condition.  The patient takes atorvastatin daily.  No side effect noted    TIA (transient ischemic attack)  Patient reported history of TIA noted at age 32.  No for the problem noted since that time she is currently taking aspirin 81 mg daily.              REVIEW OF SYSTEMS:     Constitutional:  no fever / chills   Neurologic: no headaches, no numbness/tingling  Eyes: no changes in vision  ENT: no sore throat, no hearing loss  CV:  no chest pain, no palpitations  Pulmonary: no SOB, no cough    GI: no nausea / vomiting, no diarrhea, no constipation  :  no dysuria, no hematuria   Skin: no rash   Hematologic: no bleeding      Allergies: Niacin    Current Outpatient Medications Ordered in Epic   Medication Sig Dispense Refill   • losartan (COZAAR) 100 MG Tab TAKE ONE TABLET BY MOUTH EVERY DAY 30 Tab 0   • potassium chloride SA (KDUR) 20 MEQ Tab CR Take 1 Tab by mouth 2 times a day. 180 Tab 0   • levothyroxine (SYNTHROID) 50 MCG Tab Take 1 Tab by mouth every morning. ON A EMPTY STOMACH 90 Tab 0   • atorvastatin (LIPITOR) 20 MG Tab Take 1 Tab by mouth every bedtime. 90 Tab 0   • allopurinol (ZYLOPRIM) 100 MG Tab Take 1  Tab by mouth every day. 90 Tab 0   • chlorthalidone (HYGROTON) 50 MG Tab Take 1 Tab by mouth every day. 90 Tab 3   • aspirin 81 MG tablet Take 81 mg by mouth every day.     • Cholecalciferol (VITAMIN D3) 1000 UNITS Cap Take 2,000 Units by mouth every day.     • magnesium oxide (MAG-OX) 400 MG Tab Take 400 mg by mouth every day.     • carbamide peroxide (CARBAMOXIDE EAR DROPS) 6.5 % Solution Place 6 Drops in ear 2 times a day. Administer drops in both ears. 1 Bottle 1   • Acetaminophen (TYLENOL 8 HOUR PO) Take  by mouth.     • Cetirizine HCl 10 MG Cap Take  by mouth.     • cyanocobalamin (VITAMIN B-12) 500 MCG Tab Take 500 mcg by mouth every day.       No current [facility]-ordered facility-administered medications on file.        Past Medical History:   Diagnosis Date   • Cataract    • CKD (chronic kidney disease), stage III (HCC) 11/29/2017   • Essential hypertension 2/19/2016   • Gout of both feet 6/6/2017   • Hypercalcemia 12/14/2017   • Hypertriglyceridemia 4/4/2017   • Hypothyroidism 2/19/2016   • Irregular heart beat 10/22/2019   • Stroke (HCC)     TIA at age 33        Past Surgical History:   Procedure Laterality Date   • CATARACT EXTRACTION WITH IOL Bilateral 2014   • EYE SURGERY      lasix    • MAMMOPLASTY REDUCTION          Family History   Problem Relation Age of Onset   • Hypertension Mother    • Heart Disease Mother         chf   • Lung Disease Father    • Hypertension Sister    • Hyperlipidemia Sister    • Hypertension Brother         Social History     Tobacco Use   Smoking Status Never Smoker   Smokeless Tobacco Never Used          Social History     Substance and Sexual Activity   Alcohol Use Yes   • Alcohol/week: 8.4 oz   • Types: 14 Glasses of wine per week   • Frequency: 2-3 times a week   • Drinks per session: 1 or 2    Comment: 2 glasses of wine daily        ---------------------------------------------------------------------    PHYSICAL EXAM:   Vitals:    06/16/20 1013   BP: 136/68   Pulse: 69    Resp: 14   Temp: 36.8 °C (98.3 °F)   SpO2: 99%     Body mass index is 23.27 kg/m².     Constitutional: no acute distress  Eyes: PERRL, EOMI  Ears/nose/mouth: OP no exudates  Neck: supple, no JVD  CV: heart RRR  Resp: normal effort, no wheezing or rales.  GI: abdomen soft, no obvious mass, no tenderness  Neuro: CN 2-12 grossly intact  Skin: no obvious rash noted  Psych: normal mood and affect  ---------------------------------------------------------------------    ASSESSMENT and PLAN:   1. CKD (chronic kidney disease), stage III (HCC)  Chronic stable condition  Continue follow-up with renal specialist.  Advised the patient avoid NSAIDs  - Basic Metabolic Panel; Future  - MICROALBUMIN CREAT RATIO URINE; Future    2. TIA (transient ischemic attack)  Patient presently asymptomatic.  Continue with aspirin daily      3. Essential hypertension  Chronic stable condition.  Continue with current management chlorthalidone and losartan    4. Hypothyroidism, unspecified type  Chronic stable condition per continue with levothyroxine.  Lab tests in approximately 4 months for follow-up  - TSH; Future    5. Dyslipidemia  Chronic stable condition.  Continue with atorvastatin.  - ALANINE AMINO-TRANS; Future  - Lipid Profile; Future    6. Vitamin D deficiency  Chronic stable condition.  Lab tests ordered for follow-up next lab draw  - VITAMIN 1,25 DIHYDROXY; Future  - VITAMIN B12; Future    7. Gout of both feet  Chronic condition.  Well controlled.  The patient is currently asymptomatic.  Continue with allopurinol.          Return in about 4 months (around 10/16/2020) for high blood pressure followup.     PATIENT EDUCATION:  -If any problems should arise, patient was advised to contact our office or go to ER to be evaluated.  -Advised pt to follow a healthy diet and regular aerobic exercise regimen. Advised pt to avoid alcohol and tobacco use.    Please note that this dictation was created using voice recognition software. I have  made every reasonable attempt to correct obvious errors, but it is possible there are errors of grammar and possibly content that I did not discover before finalizing the note.

## 2020-07-10 DIAGNOSIS — I10 ESSENTIAL HYPERTENSION: ICD-10-CM

## 2020-07-10 DIAGNOSIS — E87.6 HYPOKALEMIA: ICD-10-CM

## 2020-07-10 DIAGNOSIS — Z51.81 MEDICATION MONITORING ENCOUNTER: ICD-10-CM

## 2020-07-13 RX ORDER — LOSARTAN POTASSIUM 100 MG/1
TABLET ORAL
Qty: 90 TAB | Refills: 1 | Status: SHIPPED | OUTPATIENT
Start: 2020-07-13 | End: 2021-01-13

## 2020-07-13 NOTE — TELEPHONE ENCOUNTER
Last seen by PCP 6/16/2020. MD indicated to continue current therapy. Will send 6 month(s) to the pharmacy.

## 2020-07-14 DIAGNOSIS — I10 ESSENTIAL HYPERTENSION: Chronic | ICD-10-CM

## 2020-07-14 RX ORDER — POTASSIUM CHLORIDE 20 MEQ/1
TABLET, EXTENDED RELEASE ORAL
Qty: 60 TAB | Refills: 0 | Status: SHIPPED | OUTPATIENT
Start: 2020-07-14 | End: 2020-10-27

## 2020-07-15 ENCOUNTER — HOSPITAL ENCOUNTER (OUTPATIENT)
Dept: LAB | Facility: MEDICAL CENTER | Age: 80
End: 2020-07-15
Attending: INTERNAL MEDICINE
Payer: MEDICARE

## 2020-07-15 DIAGNOSIS — I10 ESSENTIAL HYPERTENSION: Chronic | ICD-10-CM

## 2020-07-15 LAB — POTASSIUM SERPL-SCNC: 4 MMOL/L (ref 3.6–5.5)

## 2020-07-15 PROCEDURE — 84132 ASSAY OF SERUM POTASSIUM: CPT

## 2020-07-15 PROCEDURE — 36415 COLL VENOUS BLD VENIPUNCTURE: CPT

## 2020-07-16 DIAGNOSIS — M10.9 GOUT OF BOTH FEET: ICD-10-CM

## 2020-07-16 DIAGNOSIS — E78.5 DYSLIPIDEMIA: ICD-10-CM

## 2020-07-16 RX ORDER — ATORVASTATIN CALCIUM 20 MG/1
TABLET, FILM COATED ORAL
Qty: 90 TAB | Refills: 0 | Status: SHIPPED | OUTPATIENT
Start: 2020-07-16 | End: 2020-10-28

## 2020-07-16 RX ORDER — ALLOPURINOL 100 MG/1
TABLET ORAL
Qty: 90 TAB | Refills: 0 | Status: SHIPPED | OUTPATIENT
Start: 2020-07-16 | End: 2020-11-09

## 2020-07-27 ENCOUNTER — OFFICE VISIT (OUTPATIENT)
Dept: URGENT CARE | Facility: PHYSICIAN GROUP | Age: 80
End: 2020-07-27
Payer: MEDICARE

## 2020-07-27 VITALS
TEMPERATURE: 98 F | RESPIRATION RATE: 16 BRPM | HEART RATE: 60 BPM | SYSTOLIC BLOOD PRESSURE: 110 MMHG | BODY MASS INDEX: 23.75 KG/M2 | HEIGHT: 60 IN | WEIGHT: 121 LBS | OXYGEN SATURATION: 99 % | DIASTOLIC BLOOD PRESSURE: 66 MMHG

## 2020-07-27 DIAGNOSIS — M62.838 SPASM OF CERVICAL PARASPINOUS MUSCLE: ICD-10-CM

## 2020-07-27 PROCEDURE — 99214 OFFICE O/P EST MOD 30 MIN: CPT | Performed by: NURSE PRACTITIONER

## 2020-07-27 RX ORDER — CYCLOBENZAPRINE HCL 5 MG
5 TABLET ORAL 3 TIMES DAILY PRN
Qty: 30 TAB | Refills: 0 | Status: SHIPPED | OUTPATIENT
Start: 2020-07-27 | End: 2020-08-06

## 2020-07-27 ASSESSMENT — FIBROSIS 4 INDEX: FIB4 SCORE: 1.21

## 2020-07-27 ASSESSMENT — ENCOUNTER SYMPTOMS
WEAKNESS: 0
FOCAL WEAKNESS: 0
FEVER: 0
DIZZINESS: 0
ABDOMINAL PAIN: 0
NECK PAIN: 1
CHILLS: 0
SENSORY CHANGE: 0
FALLS: 0

## 2020-07-27 NOTE — PROGRESS NOTES
Subjective:   Danielle Jon  is a 79 y.o. female who presents for Shoulder Pain (L shoulder/neck pain, numbness in hand/arm x3days)        79-year-old female patient reports urgent care for new problem that started approximately 3 days ago.  Patient states she has been doing quite a bit of gardening with repetitive motion but denies any mechanism of injury.  Patient states that her left shoulder feels very tight and pain is now going up the outside of her left neck and down her arm.  Patient denies any numbness or tingling.  Denies any lower back pain.  Is currently taking Tylenol for mild relief of symptoms.  Rates her pain at 5/10    Shoulder Pain   This is a new problem. The problem occurs constantly. The problem has been gradually worsening. Associated symptoms include neck pain. Pertinent negatives include no abdominal pain, chills, fever or weakness. The symptoms are aggravated by exertion. She has tried acetaminophen for the symptoms. The treatment provided mild relief.     Review of Systems   Constitutional: Negative for chills, fever and malaise/fatigue.   Gastrointestinal: Negative for abdominal pain.   Musculoskeletal: Positive for joint pain and neck pain. Negative for falls.   Neurological: Negative for dizziness, sensory change, focal weakness and weakness.     Past Medical History:   Diagnosis Date   • Cataract    • CKD (chronic kidney disease), stage III (HCC) 11/29/2017   • Essential hypertension 2/19/2016   • Gout of both feet 6/6/2017   • Hypercalcemia 12/14/2017   • Hypertriglyceridemia 4/4/2017   • Hypothyroidism 2/19/2016   • Irregular heart beat 10/22/2019   • Stroke (HCC)     TIA at age 33      Past Surgical History:   Procedure Laterality Date   • CATARACT EXTRACTION WITH IOL Bilateral 2014   • EYE SURGERY      lasix    • MAMMOPLASTY REDUCTION        Social History     Socioeconomic History   • Marital status:      Spouse name: Not on file   • Number of children: Not on file   •  Years of education: Not on file   • Highest education level: Not on file   Occupational History   • Not on file   Social Needs   • Financial resource strain: Not on file   • Food insecurity     Worry: Not on file     Inability: Not on file   • Transportation needs     Medical: Not on file     Non-medical: Not on file   Tobacco Use   • Smoking status: Never Smoker   • Smokeless tobacco: Never Used   Substance and Sexual Activity   • Alcohol use: Yes     Alcohol/week: 8.4 oz     Types: 14 Glasses of wine per week     Frequency: 2-3 times a week     Drinks per session: 1 or 2     Comment: 2 glasses of wine daily   • Drug use: No   • Sexual activity: Yes     Partners: Male   Lifestyle   • Physical activity     Days per week: Not on file     Minutes per session: Not on file   • Stress: Not on file   Relationships   • Social connections     Talks on phone: Not on file     Gets together: Not on file     Attends [employer] service: Not on file     Active member of club or organization: Not on file     Attends meetings of clubs or organizations: Not on file     Relationship status: Not on file   • Intimate partner violence     Fear of current or ex partner: Not on file     Emotionally abused: Not on file     Physically abused: Not on file     Forced sexual activity: Not on file   Other Topics Concern   • Not on file   Social History Narrative    Retired from [employer].  Lives with her .  Having some hearing concerns.  Walking without walking assistance, mentally and physically.  Independent. No social or domestic concerns.    Niacin       Objective:   /66 (BP Location: Right arm, Patient Position: Sitting, BP Cuff Size: Adult)   Pulse 60   Temp 36.7 °C (98 °F) (Temporal)   Resp 16   Ht 1.524 m (5')   Wt 54.9 kg (121 lb)   SpO2 99%   BMI 23.63 kg/m²   Physical Exam  Vitals signs reviewed.   Constitutional:       Appearance: Normal appearance.   Cardiovascular:      Rate and Rhythm: Normal rate and regular  rhythm.      Heart sounds: Normal heart sounds.   Pulmonary:      Effort: Pulmonary effort is normal.      Breath sounds: Normal breath sounds.   Musculoskeletal:        Arms:    Skin:     General: Skin is warm.   Neurological:      Mental Status: She is alert and oriented to person, place, and time.   Psychiatric:         Mood and Affect: Mood normal.         Behavior: Behavior normal.         Thought Content: Thought content normal.         Judgment: Judgment normal.           Assessment/Plan:      1. Spasm of cervical paraspinous muscle  - cyclobenzaprine (FLEXERIL) 5 MG tablet; Take 1 Tab by mouth 3 times a day as needed for Moderate Pain for up to 10 days.  Dispense: 30 Tab; Refill: 0    Discussed physical examination findings as well as mechanism of injury with repetitive motion is consistent with a spasm of the cervical paraspinous muscle on the left side.  Will provide patient with Flexeril to take up to 3 times per day.  Discussed sedating effects of medication.  Patient may also use ice, heat, gentle exercises and rest.  Advised patient she may also take over-the-counter NSAIDs and Tylenol per 's instructions.    Supportive care, differential diagnoses, and indications for immediate follow-up discussed with patient.    Pathogenesis of diagnosis discussed including typical length and natural progression. Patient expresses understanding and agrees to plan.    Instructed patient to return to clinic for worsening symptoms or symptoms that persist for 7 to 10 days     Please note that this dictation was created using voice recognition software. I have made every reasonable attempt to correct obvious errors, but I expect that there are errors of grammar and possibly content that I did not discover before finalizing the note.

## 2020-08-21 ENCOUNTER — HOSPITAL ENCOUNTER (OUTPATIENT)
Dept: LAB | Facility: MEDICAL CENTER | Age: 80
End: 2020-08-21
Attending: INTERNAL MEDICINE
Payer: MEDICARE

## 2020-08-21 DIAGNOSIS — N18.30 CKD (CHRONIC KIDNEY DISEASE), STAGE III: ICD-10-CM

## 2020-08-21 DIAGNOSIS — E21.1 HYPERPARATHYROIDISM DUE TO VITAMIN D DEFICIENCY (HCC): ICD-10-CM

## 2020-08-21 DIAGNOSIS — I10 ESSENTIAL HYPERTENSION: ICD-10-CM

## 2020-08-21 LAB
25(OH)D3 SERPL-MCNC: 77 NG/ML (ref 30–100)
ANION GAP SERPL CALC-SCNC: 11 MMOL/L (ref 7–16)
APPEARANCE UR: CLEAR
BACTERIA #/AREA URNS HPF: NEGATIVE /HPF
BILIRUB UR QL STRIP.AUTO: NEGATIVE
BUN SERPL-MCNC: 16 MG/DL (ref 8–22)
CALCIUM SERPL-MCNC: 10.4 MG/DL (ref 8.5–10.5)
CHLORIDE SERPL-SCNC: 91 MMOL/L (ref 96–112)
CO2 SERPL-SCNC: 31 MMOL/L (ref 20–33)
COLOR UR: YELLOW
CREAT SERPL-MCNC: 1 MG/DL (ref 0.5–1.4)
CREAT UR-MCNC: 51.79 MG/DL
EPI CELLS #/AREA URNS HPF: NEGATIVE /HPF
GLUCOSE SERPL-MCNC: 157 MG/DL (ref 65–99)
GLUCOSE UR STRIP.AUTO-MCNC: NEGATIVE MG/DL
HYALINE CASTS #/AREA URNS LPF: ABNORMAL /LPF
KETONES UR STRIP.AUTO-MCNC: NEGATIVE MG/DL
LEUKOCYTE ESTERASE UR QL STRIP.AUTO: ABNORMAL
MICRO URNS: ABNORMAL
MICROALBUMIN UR-MCNC: 1.6 MG/DL
MICROALBUMIN/CREAT UR: 31 MG/G (ref 0–30)
NITRITE UR QL STRIP.AUTO: NEGATIVE
PH UR STRIP.AUTO: 8 [PH] (ref 5–8)
POTASSIUM SERPL-SCNC: 3.9 MMOL/L (ref 3.6–5.5)
PROT UR QL STRIP: NEGATIVE MG/DL
PTH-INTACT SERPL-MCNC: 115 PG/ML (ref 14–72)
RBC # URNS HPF: ABNORMAL /HPF
RBC UR QL AUTO: NEGATIVE
SODIUM SERPL-SCNC: 133 MMOL/L (ref 135–145)
SP GR UR STRIP.AUTO: 1.01
UROBILINOGEN UR STRIP.AUTO-MCNC: 0.2 MG/DL
WBC #/AREA URNS HPF: ABNORMAL /HPF

## 2020-08-21 PROCEDURE — 82306 VITAMIN D 25 HYDROXY: CPT

## 2020-08-21 PROCEDURE — 82570 ASSAY OF URINE CREATININE: CPT

## 2020-08-21 PROCEDURE — 80048 BASIC METABOLIC PNL TOTAL CA: CPT

## 2020-08-21 PROCEDURE — 82043 UR ALBUMIN QUANTITATIVE: CPT

## 2020-08-21 PROCEDURE — 81001 URINALYSIS AUTO W/SCOPE: CPT

## 2020-08-21 PROCEDURE — 83970 ASSAY OF PARATHORMONE: CPT

## 2020-08-21 PROCEDURE — 36415 COLL VENOUS BLD VENIPUNCTURE: CPT

## 2020-08-25 ENCOUNTER — OFFICE VISIT (OUTPATIENT)
Dept: NEPHROLOGY | Facility: MEDICAL CENTER | Age: 80
End: 2020-08-25
Payer: MEDICARE

## 2020-08-25 VITALS
WEIGHT: 119 LBS | HEART RATE: 66 BPM | RESPIRATION RATE: 14 BRPM | SYSTOLIC BLOOD PRESSURE: 116 MMHG | BODY MASS INDEX: 22.47 KG/M2 | DIASTOLIC BLOOD PRESSURE: 60 MMHG | HEIGHT: 61 IN | TEMPERATURE: 97.4 F | OXYGEN SATURATION: 99 %

## 2020-08-25 DIAGNOSIS — E55.9 VITAMIN D DEFICIENCY: ICD-10-CM

## 2020-08-25 DIAGNOSIS — D64.9 ANEMIA, UNSPECIFIED TYPE: ICD-10-CM

## 2020-08-25 DIAGNOSIS — N18.30 CKD (CHRONIC KIDNEY DISEASE), STAGE III: ICD-10-CM

## 2020-08-25 DIAGNOSIS — M10.9 GOUT OF BOTH FEET: ICD-10-CM

## 2020-08-25 DIAGNOSIS — R80.9 MICROALBUMINURIA: ICD-10-CM

## 2020-08-25 DIAGNOSIS — I10 ESSENTIAL HYPERTENSION: ICD-10-CM

## 2020-08-25 DIAGNOSIS — E21.1 HYPERPARATHYROIDISM DUE TO VITAMIN D DEFICIENCY (HCC): ICD-10-CM

## 2020-08-25 PROCEDURE — 99214 OFFICE O/P EST MOD 30 MIN: CPT | Performed by: INTERNAL MEDICINE

## 2020-08-25 ASSESSMENT — ENCOUNTER SYMPTOMS
PALPITATIONS: 0
FLANK PAIN: 0
WEIGHT LOSS: 0
COUGH: 0
HEMOPTYSIS: 0
FEVER: 0
ORTHOPNEA: 0
WHEEZING: 0
VOMITING: 0
DIARRHEA: 0
SHORTNESS OF BREATH: 0
NAUSEA: 0
SINUS PAIN: 0
HYPERTENSION: 1
CHILLS: 0
EYES NEGATIVE: 1
ABDOMINAL PAIN: 0

## 2020-08-25 ASSESSMENT — FIBROSIS 4 INDEX: FIB4 SCORE: 1.21

## 2020-08-25 NOTE — PROGRESS NOTES
Subjective:      Danielle Jon is a 79 y.o. female who presents with Follow-Up and Chronic Kidney Disease            Chronic Kidney Disease  Pertinent negatives include no abdominal pain, chest pain, chills, congestion, coughing, fever, nausea or vomiting.   Hypertension  Pertinent negatives include no chest pain, malaise/fatigue, orthopnea, palpitations or shortness of breath.     Danielle is coming today for HTN, CKD III f/u  Doing well, no complaints  No difficulties to urinate  No dysuria/hematuria/flank pain  HTN: elevated BP improved -well controlled now  BP improved -well controlled now  CKD III - creat level stable at 1.0   Elevated PTH -vit D level well controlled  Review of Systems   Constitutional: Negative for chills, fever, malaise/fatigue and weight loss.   HENT: Negative for congestion, hearing loss and sinus pain.    Eyes: Negative.    Respiratory: Negative for cough, hemoptysis, shortness of breath and wheezing.    Cardiovascular: Negative for chest pain, palpitations, orthopnea and leg swelling.   Gastrointestinal: Negative for abdominal pain, diarrhea, nausea and vomiting.   Genitourinary: Negative for dysuria, flank pain, frequency, hematuria and urgency.   Skin: Negative.    All other systems reviewed and are negative.    Past Medical History:   Diagnosis Date   • Cataract    • CKD (chronic kidney disease), stage III (HCC) 11/29/2017   • Essential hypertension 2/19/2016   • Gout of both feet 6/6/2017   • Hypercalcemia 12/14/2017   • Hypertriglyceridemia 4/4/2017   • Hypothyroidism 2/19/2016   • Irregular heart beat 10/22/2019   • Stroke (HCC)     TIA at age 33       Family History   Problem Relation Age of Onset   • Hypertension Mother    • Heart Disease Mother         chf   • Lung Disease Father    • Hypertension Sister    • Hyperlipidemia Sister    • Hypertension Brother        Social History     Socioeconomic History   • Marital status:      Spouse name: Not on file   • Number of  "children: Not on file   • Years of education: Not on file   • Highest education level: Not on file   Occupational History   • Not on file   Social Needs   • Financial resource strain: Not on file   • Food insecurity     Worry: Not on file     Inability: Not on file   • Transportation needs     Medical: Not on file     Non-medical: Not on file   Tobacco Use   • Smoking status: Never Smoker   • Smokeless tobacco: Never Used   Substance and Sexual Activity   • Alcohol use: Yes     Alcohol/week: 8.4 oz     Types: 14 Glasses of wine per week     Frequency: 2-3 times a week     Drinks per session: 1 or 2     Comment: 2 glasses of wine daily   • Drug use: No   • Sexual activity: Yes     Partners: Male   Lifestyle   • Physical activity     Days per week: Not on file     Minutes per session: Not on file   • Stress: Not on file   Relationships   • Social connections     Talks on phone: Not on file     Gets together: Not on file     Attends [employer] service: Not on file     Active member of club or organization: Not on file     Attends meetings of clubs or organizations: Not on file     Relationship status: Not on file   • Intimate partner violence     Fear of current or ex partner: Not on file     Emotionally abused: Not on file     Physically abused: Not on file     Forced sexual activity: Not on file   Other Topics Concern   • Not on file   Social History Narrative    Retired from [employer].  Lives with her .  Having some hearing concerns.  Walking without walking assistance, mentally and physically.  Independent. No social or domestic concerns.          Objective:     /60 (BP Location: Right arm, Patient Position: Sitting)   Pulse 66   Temp 36.3 °C (97.4 °F)   Resp 14   Ht 1.549 m (5' 1\")   Wt 54 kg (119 lb)   SpO2 99%   BMI 22.48 kg/m²      Physical Exam  Vitals signs and nursing note reviewed.   Constitutional:       General: She is not in acute distress.     Appearance: Normal appearance. She is " well-developed. She is not diaphoretic.   HENT:      Head: Normocephalic and atraumatic.      Nose: Nose normal.      Mouth/Throat:      Mouth: Mucous membranes are moist.      Pharynx: Oropharynx is clear.   Eyes:      Extraocular Movements: Extraocular movements intact.      Conjunctiva/sclera: Conjunctivae normal.      Pupils: Pupils are equal, round, and reactive to light.   Neck:      Musculoskeletal: Normal range of motion and neck supple.   Cardiovascular:      Rate and Rhythm: Normal rate and regular rhythm.      Pulses: Normal pulses.      Heart sounds: Normal heart sounds. No friction rub. No gallop.    Pulmonary:      Effort: Pulmonary effort is normal. No respiratory distress.      Breath sounds: Normal breath sounds. No wheezing, rhonchi or rales.   Abdominal:      General: Abdomen is flat. Bowel sounds are normal. There is no distension.      Palpations: There is no mass.      Tenderness: There is no abdominal tenderness. There is no right CVA tenderness, left CVA tenderness or guarding.   Musculoskeletal:         General: No swelling.      Right lower leg: No edema.      Left lower leg: No edema.   Skin:     General: Skin is warm.      Coloration: Skin is not jaundiced.      Findings: No erythema or rash.   Neurological:      General: No focal deficit present.      Mental Status: She is alert and oriented to person, place, and time.      Cranial Nerves: No cranial nerve deficit.      Coordination: Coordination normal.   Psychiatric:         Mood and Affect: Mood normal.         Behavior: Behavior normal.         Thought Content: Thought content normal.         Judgment: Judgment normal.               Laboratory results reviewed: d/w Pt  Lab Results   Component Value Date/Time    CREATININE 1.00 08/21/2020 01:49 PM    POTASSIUM 3.9 08/21/2020 01:49 PM       Assessment/Plan:     1. CKD (chronic kidney disease), stage III      creat level improved 1.0 - to monitor      2. Essential hypertension      BP  well controlled - continue current treatment    3. Vit D def      Low vit D level - improved -continue supplementation      elevated PTH - slightly worse     -continue vit D     -consider nuclear parathyroid imaging if no improvement    4. Anemia : Hb level WNL    5. Gout; uric acid well controlled with allopurinol      To monitor    6. Microalbuminuria mild -controlled with ARB  Recs:              Continue current medications, low Na diet, monitor BP             F/u in 3-4 months with BMP, vit D, PTH, UA, urine microalb/creat ratio

## 2020-09-12 DIAGNOSIS — E03.9 HYPOTHYROIDISM, UNSPECIFIED TYPE: ICD-10-CM

## 2020-09-14 RX ORDER — LEVOTHYROXINE SODIUM 0.05 MG/1
TABLET ORAL
Qty: 90 TAB | Refills: 0 | Status: SHIPPED | OUTPATIENT
Start: 2020-09-14 | End: 2020-12-14

## 2020-10-19 ENCOUNTER — HOSPITAL ENCOUNTER (OUTPATIENT)
Dept: LAB | Facility: MEDICAL CENTER | Age: 80
End: 2020-10-19
Attending: INTERNAL MEDICINE
Payer: MEDICARE

## 2020-10-20 ENCOUNTER — HOSPITAL ENCOUNTER (OUTPATIENT)
Dept: LAB | Facility: MEDICAL CENTER | Age: 80
End: 2020-10-20
Attending: INTERNAL MEDICINE
Payer: MEDICARE

## 2020-10-20 DIAGNOSIS — E78.5 DYSLIPIDEMIA: Chronic | ICD-10-CM

## 2020-10-20 DIAGNOSIS — E55.9 VITAMIN D DEFICIENCY: ICD-10-CM

## 2020-10-20 DIAGNOSIS — E03.9 HYPOTHYROIDISM, UNSPECIFIED TYPE: Chronic | ICD-10-CM

## 2020-10-20 DIAGNOSIS — N18.30 CKD (CHRONIC KIDNEY DISEASE), STAGE III: Chronic | ICD-10-CM

## 2020-10-20 LAB
ALT SERPL-CCNC: 24 U/L (ref 2–50)
ANION GAP SERPL CALC-SCNC: 7 MMOL/L (ref 7–16)
BUN SERPL-MCNC: 16 MG/DL (ref 8–22)
CALCIUM SERPL-MCNC: 10.1 MG/DL (ref 8.5–10.5)
CHLORIDE SERPL-SCNC: 98 MMOL/L (ref 96–112)
CHOLEST SERPL-MCNC: 130 MG/DL (ref 100–199)
CO2 SERPL-SCNC: 32 MMOL/L (ref 20–33)
CREAT SERPL-MCNC: 1.19 MG/DL (ref 0.5–1.4)
CREAT UR-MCNC: 57.67 MG/DL
FASTING STATUS PATIENT QL REPORTED: NORMAL
GLUCOSE SERPL-MCNC: 94 MG/DL (ref 65–99)
HDLC SERPL-MCNC: 62 MG/DL
LDLC SERPL CALC-MCNC: 53 MG/DL
MICROALBUMIN UR-MCNC: 1.8 MG/DL
MICROALBUMIN/CREAT UR: 31 MG/G (ref 0–30)
POTASSIUM SERPL-SCNC: 3.9 MMOL/L (ref 3.6–5.5)
SODIUM SERPL-SCNC: 137 MMOL/L (ref 135–145)
TRIGL SERPL-MCNC: 77 MG/DL (ref 0–149)
TSH SERPL DL<=0.005 MIU/L-ACNC: 2.48 UIU/ML (ref 0.38–5.33)
VIT B12 SERPL-MCNC: 1254 PG/ML (ref 211–911)

## 2020-10-20 PROCEDURE — 84460 ALANINE AMINO (ALT) (SGPT): CPT

## 2020-10-20 PROCEDURE — 82652 VIT D 1 25-DIHYDROXY: CPT

## 2020-10-20 PROCEDURE — 82043 UR ALBUMIN QUANTITATIVE: CPT

## 2020-10-20 PROCEDURE — 80061 LIPID PANEL: CPT

## 2020-10-20 PROCEDURE — 82570 ASSAY OF URINE CREATININE: CPT

## 2020-10-20 PROCEDURE — 80048 BASIC METABOLIC PNL TOTAL CA: CPT

## 2020-10-20 PROCEDURE — 84443 ASSAY THYROID STIM HORMONE: CPT

## 2020-10-20 PROCEDURE — 82607 VITAMIN B-12: CPT

## 2020-10-20 PROCEDURE — 36415 COLL VENOUS BLD VENIPUNCTURE: CPT

## 2020-10-23 ENCOUNTER — OFFICE VISIT (OUTPATIENT)
Dept: MEDICAL GROUP | Facility: PHYSICIAN GROUP | Age: 80
End: 2020-10-23
Payer: MEDICARE

## 2020-10-23 VITALS
WEIGHT: 122 LBS | DIASTOLIC BLOOD PRESSURE: 68 MMHG | RESPIRATION RATE: 14 BRPM | BODY MASS INDEX: 23.03 KG/M2 | TEMPERATURE: 98.5 F | HEART RATE: 69 BPM | OXYGEN SATURATION: 100 % | HEIGHT: 61 IN | SYSTOLIC BLOOD PRESSURE: 134 MMHG

## 2020-10-23 DIAGNOSIS — I10 ESSENTIAL HYPERTENSION: Chronic | ICD-10-CM

## 2020-10-23 DIAGNOSIS — N18.31 STAGE 3A CHRONIC KIDNEY DISEASE: Chronic | ICD-10-CM

## 2020-10-23 DIAGNOSIS — G25.0 ESSENTIAL TREMOR: Chronic | ICD-10-CM

## 2020-10-23 DIAGNOSIS — E78.5 DYSLIPIDEMIA: Chronic | ICD-10-CM

## 2020-10-23 DIAGNOSIS — E03.9 HYPOTHYROIDISM, UNSPECIFIED TYPE: Chronic | ICD-10-CM

## 2020-10-23 LAB — 1,25(OH)2D3 SERPL-MCNC: 39.4 PG/ML (ref 19.9–79.3)

## 2020-10-23 PROCEDURE — 99214 OFFICE O/P EST MOD 30 MIN: CPT | Performed by: INTERNAL MEDICINE

## 2020-10-23 ASSESSMENT — FIBROSIS 4 INDEX: FIB4 SCORE: 1.03

## 2020-10-23 NOTE — ASSESSMENT & PLAN NOTE
This is a chronic condition.  The patient's baseline GFR is in the 50s.  Advised the patient to avoid NSAIDs.  Continue to monitor.

## 2020-10-23 NOTE — ASSESSMENT & PLAN NOTE
This is a chronic condition.  Mild.  Mainly affecting the hands noted for approximately 1 year.  Condition is stable  Symptoms occurring when the patient try to write something.  Is not occurring on a daily basis.  Discussed with the patient that we can try some medication such as beta-blocker or primidone if her symptoms are severe..  No other disability noted.  At this time since the patient symptom is mild.  The patient does not wish to take any medication.  We will continue to monitor.

## 2020-10-23 NOTE — ASSESSMENT & PLAN NOTE
This is a chronic condition but the patient is presently taking losartan and chlorthalidone.  No side effect reported.

## 2020-10-23 NOTE — ASSESSMENT & PLAN NOTE
Chronic stable condition.  The patient is taking levothyroxine.  Recent TSH is within the acceptable range.

## 2020-10-23 NOTE — PROGRESS NOTES
CC: Follow-up hypertension  Discuss lab test results      HPI: This is a 80 y.o. pt.  Pt's medical history is notable for:     Essential hypertension  This is a chronic condition but the patient is presently taking losartan and chlorthalidone.  No side effect reported.    Hypothyroidism  Chronic stable condition.  The patient is taking levothyroxine.  Recent TSH is within the acceptable range.    CKD (chronic kidney disease), stage III (HCC)  This is a chronic condition.  The patient's baseline GFR is in the 50s.  Advised the patient to avoid NSAIDs.  Continue to monitor.    Dyslipidemia  Chronic condition patient is taking Lipitor daily.    Essential tremor  This is a chronic condition.  Mild.  Mainly affecting the hands noted for approximately 1 year.  Condition is stable per patient report.  Symptoms occurring when the patient try to write something.  Is not occurring on a daily basis.  Discussed with the patient that we can try some medication such as beta-blocker or primidone if her symptoms are severe..  No other disability noted.  At this time since the patient symptom is mild.  The patient does not wish to take any medication.  We will continue to monitor.    Recent lab test result discussed with the patient vitamin B12 level is mildly elevated.  The patient has been taking vitamin B12 1000 mcg daily.  Advised the patient to reduce the dose to 500 mcg daily.      REVIEW OF SYSTEMS:     Constitutional:  no fever / chills   Neurologic:  Chronic mild tremor, intermittently noted in the hands  Eyes: no changes in vision  ENT: no sore throat, no hearing loss  CV:  no chest pain, no palpitations  Pulmonary: no SOB, no cough    GI: no nausea / vomiting, no diarrhea, no constipation  :  no dysuria, no hematuria       Allergies: Niacin    Current Outpatient Medications Ordered in Epic   Medication Sig Dispense Refill   • levothyroxine (SYNTHROID) 50 MCG Tab TAKE ONE TABLET BY MOUTH EVERY MORNING ON AN EMPTY STOMACH  90 Tab 0   • allopurinol (ZYLOPRIM) 100 MG Tab TAKE ONE TABLET BY MOUTH EVERY DAY 90 Tab 0   • atorvastatin (LIPITOR) 20 MG Tab TAKE ONE TABLET BY MOUTH AT BEDTIME 90 Tab 0   • potassium chloride SA (KDUR) 20 MEQ Tab CR TAKE ONE TABLET BY MOUTH TWICE A DAY 60 Tab 0   • losartan (COZAAR) 100 MG Tab TAKE ONE TABLET BY MOUTH EVERY DAY 90 Tab 1   • chlorthalidone (HYGROTON) 50 MG Tab Take 1 Tab by mouth every day. 90 Tab 3   • aspirin 81 MG tablet Take 81 mg by mouth every day.     • magnesium oxide (MAG-OX) 400 MG Tab Take 400 mg by mouth every day.     • carbamide peroxide (CARBAMOXIDE EAR DROPS) 6.5 % Solution Place 6 Drops in ear 2 times a day. Administer drops in both ears. 1 Bottle 1   • Acetaminophen (TYLENOL 8 HOUR PO) Take  by mouth.     • Cetirizine HCl 10 MG Cap Take  by mouth.     • cyanocobalamin (VITAMIN B-12) 500 MCG Tab Take 500 mcg by mouth every day.     • Cholecalciferol (VITAMIN D3) 1000 UNITS Cap Take 2,000 Units by mouth every day.       No current [facility]-ordered facility-administered medications on file.        Past Medical History:   Diagnosis Date   • Cataract    • CKD (chronic kidney disease), stage III 11/29/2017   • Essential hypertension 2/19/2016   • Gout of both feet 6/6/2017   • Hypercalcemia 12/14/2017   • Hypertriglyceridemia 4/4/2017   • Hypothyroidism 2/19/2016   • Irregular heart beat 10/22/2019   • Stroke (HCC)     TIA at age 33        Past Surgical History:   Procedure Laterality Date   • CATARACT EXTRACTION WITH IOL Bilateral 2014   • EYE SURGERY      lasix    • MAMMOPLASTY REDUCTION          Family History   Problem Relation Age of Onset   • Hypertension Mother    • Heart Disease Mother         chf   • Lung Disease Father    • Hypertension Sister    • Hyperlipidemia Sister    • Hypertension Brother         Social History     Tobacco Use   Smoking Status Never Smoker   Smokeless Tobacco Never Used          Social History     Substance and Sexual Activity   Alcohol Use Yes   •  Alcohol/week: 8.4 oz   • Types: 14 Glasses of wine per week   • Frequency: 2-3 times a week   • Drinks per session: 1 or 2    Comment: 2 glasses of wine daily         ------------------------------------------------------------------------------     PHYSICAL EXAM:   Vitals:    10/23/20 1004   BP: 134/68   Pulse: 69   Resp: 14   Temp: 36.9 °C (98.5 °F)   SpO2: 100%      Body mass index is 23.05 kg/m².         Constitutional: no acute distress  Neck: supple, no JVD  CV: heart RRR  Resp: normal effort, no wheezing or rales.  GI: abdomen soft, no obvious mass, no tenderness  Neuro: CN 2-12 grossly intact    Results for BILL WINTERS (MRN [id number]) as of 10/23/2020 10:17   Ref. Range 10/20/2020 09:03   Sodium Latest Ref Range: 135 - 145 mmol/L 137   Potassium Latest Ref Range: 3.6 - 5.5 mmol/L 3.9   Chloride Latest Ref Range: 96 - 112 mmol/L 98   Co2 Latest Ref Range: 20 - 33 mmol/L 32   Anion Gap Latest Ref Range: 7.0 - 16.0  7.0   Glucose Latest Ref Range: 65 - 99 mg/dL 94   Bun Latest Ref Range: 8 - 22 mg/dL 16   Creatinine Latest Ref Range: 0.50 - 1.40 mg/dL 1.19   GFR If  Latest Ref Range: >60 mL/min/1.73 m 2 53 (A)   GFR If Non  Latest Ref Range: >60 mL/min/1.73 m 2 44 (A)   Calcium Latest Ref Range: 8.5 - 10.5 mg/dL 10.1   ALT(SGPT) Latest Ref Range: 2 - 50 U/L 24   Fasting Status Unknown Fasting   Cholesterol,Tot Latest Ref Range: 100 - 199 mg/dL 130   Triglycerides Latest Ref Range: 0 - 149 mg/dL 77   HDL Latest Ref Range: >=40 mg/dL 62   LDL Latest Ref Range: <100 mg/dL 53   Vitamin B12 -True Cobalamin Latest Ref Range: 211 - 911 pg/mL 1254 (H)   Vitamin D-1, 25-Dihydroxy Latest Ref Range: 19.9 - 79.3 pg/mL 39.4   TSH Latest Ref Range: 0.380 - 5.330 uIU/mL 2.480       -----------------------------------------------------------------------------    ASSESSMENT:   1. Essential hypertension    2. Hypothyroidism, unspecified type    3. Stage 3a chronic kidney disease    4.  Dyslipidemia    5. Essential tremor           MEDICAL DECISION MAKING: TODAY'S ASSESSMENT / STATUS / PLAN:    Medically the patient appeared to be stable.  Recommend to continue with current medications.  The patient to avoid NSAIDs and continue follow-up with renal specialist.  As above her essential tremor symptoms are mild.  The patient is not interested in taking a medication at this time.  We will continue to monitor.     Return in about 6 months (around 4/23/2021).       PATIENT EDUCATION:  -If any problems should arise, patient was advised to contact our office or go to ER to be evaluated.  -Advised pt to follow a healthy diet and regular aerobic exercise regimen. Advised pt to avoid alcohol and tobacco use.    Please note that this dictation was created using voice recognition software. I have made every reasonable attempt to correct obvious errors, but it is possible there are errors of grammar and possibly content that I did not discover before finalizing the note.

## 2020-10-25 DIAGNOSIS — Z51.81 MEDICATION MONITORING ENCOUNTER: ICD-10-CM

## 2020-10-25 DIAGNOSIS — E87.6 HYPOKALEMIA: ICD-10-CM

## 2020-10-27 DIAGNOSIS — E78.5 DYSLIPIDEMIA: ICD-10-CM

## 2020-10-27 RX ORDER — POTASSIUM CHLORIDE 20 MEQ/1
TABLET, EXTENDED RELEASE ORAL
Qty: 180 TAB | Refills: 1 | Status: SHIPPED | OUTPATIENT
Start: 2020-10-27 | End: 2021-08-09

## 2020-10-27 NOTE — TELEPHONE ENCOUNTER
Refill X 6 months, sent to pharmacy.Pt. Seen in the last 6 months per protocol.   Lab Results   Component Value Date/Time    SODIUM 137 10/20/2020 09:03 AM    POTASSIUM 3.9 10/20/2020 09:03 AM    CHLORIDE 98 10/20/2020 09:03 AM    CO2 32 10/20/2020 09:03 AM    GLUCOSE 94 10/20/2020 09:03 AM    BUN 16 10/20/2020 09:03 AM    CREATININE 1.19 10/20/2020 09:03 AM

## 2020-10-28 RX ORDER — ATORVASTATIN CALCIUM 20 MG/1
TABLET, FILM COATED ORAL
Qty: 90 TAB | Refills: 3 | Status: SHIPPED | OUTPATIENT
Start: 2020-10-28 | End: 2021-10-25

## 2020-10-28 NOTE — TELEPHONE ENCOUNTER
Pt has had OV within the 12 month protocol and lipid panel is current. 12 month supply sent to pharmacy.   Lab Results   Component Value Date/Time    CHOLSTRLTOT 130 10/20/2020 09:03 AM    LDL 53 10/20/2020 09:03 AM    HDL 62 10/20/2020 09:03 AM    TRIGLYCERIDE 77 10/20/2020 09:03 AM       Lab Results   Component Value Date/Time    SODIUM 137 10/20/2020 09:03 AM    POTASSIUM 3.9 10/20/2020 09:03 AM    CHLORIDE 98 10/20/2020 09:03 AM    CO2 32 10/20/2020 09:03 AM    GLUCOSE 94 10/20/2020 09:03 AM    BUN 16 10/20/2020 09:03 AM    CREATININE 1.19 10/20/2020 09:03 AM     Lab Results   Component Value Date/Time    ALKPHOSPHAT 46 01/15/2020 11:25 AM    ASTSGOT 18 01/15/2020 11:25 AM    ALTSGPT 24 10/20/2020 09:03 AM    TBILIRUBIN 0.9 01/15/2020 11:25 AM

## 2020-11-05 DIAGNOSIS — M10.9 GOUT OF BOTH FEET: ICD-10-CM

## 2020-11-09 RX ORDER — ALLOPURINOL 100 MG/1
TABLET ORAL
Qty: 90 TAB | Refills: 1 | Status: SHIPPED | OUTPATIENT
Start: 2020-11-09 | End: 2021-05-20

## 2020-12-01 ENCOUNTER — HOSPITAL ENCOUNTER (OUTPATIENT)
Dept: LAB | Facility: MEDICAL CENTER | Age: 80
End: 2020-12-01
Attending: INTERNAL MEDICINE
Payer: MEDICARE

## 2020-12-01 DIAGNOSIS — N18.30 CKD (CHRONIC KIDNEY DISEASE), STAGE III: ICD-10-CM

## 2020-12-01 DIAGNOSIS — E55.9 VITAMIN D DEFICIENCY: ICD-10-CM

## 2020-12-01 LAB
25(OH)D3 SERPL-MCNC: 61 NG/ML (ref 30–100)
ANION GAP SERPL CALC-SCNC: 7 MMOL/L (ref 7–16)
BUN SERPL-MCNC: 15 MG/DL (ref 8–22)
CALCIUM SERPL-MCNC: 10.4 MG/DL (ref 8.5–10.5)
CHLORIDE SERPL-SCNC: 100 MMOL/L (ref 96–112)
CO2 SERPL-SCNC: 29 MMOL/L (ref 20–33)
CREAT SERPL-MCNC: 1.22 MG/DL (ref 0.5–1.4)
FASTING STATUS PATIENT QL REPORTED: NORMAL
GLUCOSE SERPL-MCNC: 92 MG/DL (ref 65–99)
POTASSIUM SERPL-SCNC: 4.2 MMOL/L (ref 3.6–5.5)
PTH-INTACT SERPL-MCNC: 99.4 PG/ML (ref 14–72)
SODIUM SERPL-SCNC: 136 MMOL/L (ref 135–145)

## 2020-12-01 PROCEDURE — 82306 VITAMIN D 25 HYDROXY: CPT

## 2020-12-01 PROCEDURE — 80048 BASIC METABOLIC PNL TOTAL CA: CPT

## 2020-12-01 PROCEDURE — 36415 COLL VENOUS BLD VENIPUNCTURE: CPT

## 2020-12-01 PROCEDURE — 83970 ASSAY OF PARATHORMONE: CPT

## 2020-12-03 ENCOUNTER — OFFICE VISIT (OUTPATIENT)
Dept: NEPHROLOGY | Facility: MEDICAL CENTER | Age: 80
End: 2020-12-03
Payer: MEDICARE

## 2020-12-03 VITALS
OXYGEN SATURATION: 99 % | BODY MASS INDEX: 23.43 KG/M2 | RESPIRATION RATE: 12 BRPM | DIASTOLIC BLOOD PRESSURE: 58 MMHG | WEIGHT: 124 LBS | SYSTOLIC BLOOD PRESSURE: 138 MMHG | HEART RATE: 80 BPM | TEMPERATURE: 97 F

## 2020-12-03 DIAGNOSIS — D64.9 ANEMIA, UNSPECIFIED TYPE: ICD-10-CM

## 2020-12-03 DIAGNOSIS — R80.9 MICROALBUMINURIA: ICD-10-CM

## 2020-12-03 DIAGNOSIS — E55.9 VITAMIN D DEFICIENCY: ICD-10-CM

## 2020-12-03 DIAGNOSIS — N18.31 STAGE 3A CHRONIC KIDNEY DISEASE: ICD-10-CM

## 2020-12-03 DIAGNOSIS — I10 ESSENTIAL HYPERTENSION: ICD-10-CM

## 2020-12-03 DIAGNOSIS — E21.1 HYPERPARATHYROIDISM DUE TO VITAMIN D DEFICIENCY (HCC): ICD-10-CM

## 2020-12-03 PROCEDURE — 99214 OFFICE O/P EST MOD 30 MIN: CPT | Performed by: INTERNAL MEDICINE

## 2020-12-03 ASSESSMENT — ENCOUNTER SYMPTOMS
WHEEZING: 0
HYPERTENSION: 1
CHILLS: 0
VOMITING: 0
COUGH: 0
WEIGHT LOSS: 0
PALPITATIONS: 0
ABDOMINAL PAIN: 0
EYES NEGATIVE: 1
HEMOPTYSIS: 0
FEVER: 0
SINUS PAIN: 0
DIARRHEA: 0
FLANK PAIN: 0
ORTHOPNEA: 0
NAUSEA: 0
SHORTNESS OF BREATH: 0

## 2020-12-03 ASSESSMENT — FIBROSIS 4 INDEX: FIB4 SCORE: 1.03

## 2020-12-03 NOTE — PROGRESS NOTES
Subjective:      Danielle Jon is a 80 y.o. female who presents with Chronic Kidney Disease and Hypertension            Chronic Kidney Disease  Pertinent negatives include no abdominal pain, chest pain, chills, congestion, coughing, fever, nausea or vomiting.   Hypertension  Pertinent negatives include no chest pain, malaise/fatigue, orthopnea, palpitations or shortness of breath.     Danielle is coming today for HTN, CKD III f/u  Doing well, no complaints  No difficulties to urinate  No dysuria/hematuria/flank pain  HTN: elevated BP improved -well controlled now  BP improved -well controlled now  CKD III - creat level stable at baseline  Elevated PTH -vit D level under better control    Review of Systems   Constitutional: Negative for chills, fever, malaise/fatigue and weight loss.   HENT: Negative for congestion, hearing loss and sinus pain.    Eyes: Negative.    Respiratory: Negative for cough, hemoptysis, shortness of breath and wheezing.    Cardiovascular: Negative for chest pain, palpitations, orthopnea and leg swelling.   Gastrointestinal: Negative for abdominal pain, diarrhea, nausea and vomiting.   Genitourinary: Negative for dysuria, flank pain, frequency, hematuria and urgency.   Skin: Negative.    All other systems reviewed and are negative.    Past Medical History:   Diagnosis Date   • Cataract    • CKD (chronic kidney disease), stage III 11/29/2017   • Essential hypertension 2/19/2016   • Gout of both feet 6/6/2017   • Hypercalcemia 12/14/2017   • Hypertriglyceridemia 4/4/2017   • Hypothyroidism 2/19/2016   • Irregular heart beat 10/22/2019   • Stroke (HCC)     TIA at age 33       Family History   Problem Relation Age of Onset   • Hypertension Mother    • Heart Disease Mother         chf   • Lung Disease Father    • Hypertension Sister    • Hyperlipidemia Sister    • Hypertension Brother        Social History     Socioeconomic History   • Marital status:      Spouse name: Not on file   • Number  of children: Not on file   • Years of education: Not on file   • Highest education level: Not on file   Occupational History   • Not on file   Social Needs   • Financial resource strain: Not on file   • Food insecurity     Worry: Not on file     Inability: Not on file   • Transportation needs     Medical: Not on file     Non-medical: Not on file   Tobacco Use   • Smoking status: Never Smoker   • Smokeless tobacco: Never Used   Substance and Sexual Activity   • Alcohol use: Yes     Alcohol/week: 8.4 oz     Types: 14 Glasses of wine per week     Frequency: 2-3 times a week     Drinks per session: 1 or 2     Comment: 2 glasses of wine daily   • Drug use: No   • Sexual activity: Yes     Partners: Male   Lifestyle   • Physical activity     Days per week: Not on file     Minutes per session: Not on file   • Stress: Not on file   Relationships   • Social connections     Talks on phone: Not on file     Gets together: Not on file     Attends Judaism service: Not on file     Active member of club or organization: Not on file     Attends meetings of clubs or organizations: Not on file     Relationship status: Not on file   • Intimate partner violence     Fear of current or ex partner: Not on file     Emotionally abused: Not on file     Physically abused: Not on file     Forced sexual activity: Not on file   Other Topics Concern   • Not on file   Social History Narrative    Retired from [employer].  Lives with her .  Having some hearing concerns.  Walking without walking assistance, mentally and physically.  Independent. No social or domestic concerns.          Objective:     /58   Pulse 80   Temp 36.1 °C (97 °F) (Temporal)   Resp 12   Wt 56.2 kg (124 lb)   SpO2 99%   BMI 23.43 kg/m²      Physical Exam  Vitals signs and nursing note reviewed.   Constitutional:       General: She is not in acute distress.     Appearance: Normal appearance. She is well-developed. She is not diaphoretic.   HENT:      Head:  Normocephalic and atraumatic.      Nose: Nose normal.      Mouth/Throat:      Mouth: Mucous membranes are moist.      Pharynx: Oropharynx is clear.   Eyes:      General: No scleral icterus.     Extraocular Movements: Extraocular movements intact.      Conjunctiva/sclera: Conjunctivae normal.      Pupils: Pupils are equal, round, and reactive to light.   Neck:      Musculoskeletal: Normal range of motion and neck supple.   Cardiovascular:      Rate and Rhythm: Normal rate and regular rhythm.      Pulses: Normal pulses.      Heart sounds: Normal heart sounds. No friction rub. No gallop.    Pulmonary:      Effort: Pulmonary effort is normal. No respiratory distress.      Breath sounds: Normal breath sounds. No wheezing, rhonchi or rales.   Abdominal:      General: Bowel sounds are normal. There is no distension.      Palpations: Abdomen is soft. There is no mass.      Tenderness: There is no abdominal tenderness. There is no right CVA tenderness, left CVA tenderness or guarding.   Musculoskeletal:      Right lower leg: No edema.      Left lower leg: No edema.   Skin:     General: Skin is warm.      Findings: No erythema or rash.   Neurological:      General: No focal deficit present.      Mental Status: She is alert and oriented to person, place, and time.      Cranial Nerves: No cranial nerve deficit.      Coordination: Coordination normal.   Psychiatric:         Mood and Affect: Mood normal.         Behavior: Behavior normal.         Thought Content: Thought content normal.         Judgment: Judgment normal.               Laboratory results reviewed: d/w Pt  Lab Results   Component Value Date/Time    CREATININE 1.22 12/01/2020 09:40 AM    POTASSIUM 4.2 12/01/2020 09:40 AM       Assessment/Plan:     1. CKD (chronic kidney disease), stage III      creat level stable at baseline -to monitor      2. Essential hypertension      BP well controlled - continue current treatment    3. Vit D def      Low vit D level - improved  -continue supplementation      elevated PTH - improving     -continue vit D     -hold nuclear parathyroid imaging for now    4. Anemia : Hb level WNL    5. Gout; uric acid well controlled with allopurinol      To monitor    6. Microalbuminuria mild -controlled with ARB  Recs:              Continue current medications, low Na diet, monitor BP             F/u in 4 months with BMP, vit D, PTH, urine microalb/creat ratio, CBC

## 2020-12-12 DIAGNOSIS — E03.9 HYPOTHYROIDISM, UNSPECIFIED TYPE: ICD-10-CM

## 2020-12-14 RX ORDER — LEVOTHYROXINE SODIUM 0.05 MG/1
TABLET ORAL
Qty: 90 TAB | Refills: 3 | Status: SHIPPED | OUTPATIENT
Start: 2020-12-14 | End: 2021-12-09 | Stop reason: SDUPTHER

## 2021-01-11 DIAGNOSIS — I10 ESSENTIAL HYPERTENSION: ICD-10-CM

## 2021-01-11 DIAGNOSIS — Z23 NEED FOR VACCINATION: ICD-10-CM

## 2021-01-13 RX ORDER — LOSARTAN POTASSIUM 100 MG/1
TABLET ORAL
Qty: 90 TAB | Refills: 1 | Status: SHIPPED | OUTPATIENT
Start: 2021-01-13 | End: 2021-07-23

## 2021-01-13 NOTE — TELEPHONE ENCOUNTER
Last seen by PCP 10/23/2020.   Last Blood Pressure reading was 138/58 on 12/3/2020    Lab Results   Component Value Date/Time    SODIUM 136 12/01/2020 09:40 AM    POTASSIUM 4.2 12/01/2020 09:40 AM    CHLORIDE 100 12/01/2020 09:40 AM    CO2 29 12/01/2020 09:40 AM    GLUCOSE 92 12/01/2020 09:40 AM    BUN 15 12/01/2020 09:40 AM    CREATININE 1.22 12/01/2020 09:40 AM       Will send 6 month(s) to the pharmacy.

## 2021-01-15 ENCOUNTER — HOSPITAL ENCOUNTER (OUTPATIENT)
Dept: LAB | Facility: MEDICAL CENTER | Age: 81
End: 2021-01-15
Attending: INTERNAL MEDICINE
Payer: MEDICARE

## 2021-01-15 DIAGNOSIS — I10 ESSENTIAL HYPERTENSION: ICD-10-CM

## 2021-01-15 DIAGNOSIS — D64.9 ANEMIA, UNSPECIFIED TYPE: ICD-10-CM

## 2021-01-15 DIAGNOSIS — N18.30 CKD (CHRONIC KIDNEY DISEASE), STAGE III: ICD-10-CM

## 2021-01-15 DIAGNOSIS — I47.19 ATRIAL TACHYCARDIA (HCC): ICD-10-CM

## 2021-01-15 PROCEDURE — 80048 BASIC METABOLIC PNL TOTAL CA: CPT

## 2021-01-15 PROCEDURE — 36415 COLL VENOUS BLD VENIPUNCTURE: CPT

## 2021-01-16 LAB
ANION GAP SERPL CALC-SCNC: 11 MMOL/L (ref 7–16)
BUN SERPL-MCNC: 19 MG/DL (ref 8–22)
CALCIUM SERPL-MCNC: 10.3 MG/DL (ref 8.5–10.5)
CHLORIDE SERPL-SCNC: 102 MMOL/L (ref 96–112)
CO2 SERPL-SCNC: 27 MMOL/L (ref 20–33)
CREAT SERPL-MCNC: 1.12 MG/DL (ref 0.5–1.4)
FASTING STATUS PATIENT QL REPORTED: NORMAL
GLUCOSE SERPL-MCNC: 78 MG/DL (ref 65–99)
POTASSIUM SERPL-SCNC: 4 MMOL/L (ref 3.6–5.5)
SODIUM SERPL-SCNC: 140 MMOL/L (ref 135–145)

## 2021-01-20 DIAGNOSIS — I10 ESSENTIAL HYPERTENSION: Chronic | ICD-10-CM

## 2021-01-21 RX ORDER — CHLORTHALIDONE 50 MG/1
TABLET ORAL
Qty: 90 TAB | Refills: 0 | Status: SHIPPED | OUTPATIENT
Start: 2021-01-21 | End: 2021-04-29

## 2021-01-22 ENCOUNTER — IMMUNIZATION (OUTPATIENT)
Dept: FAMILY PLANNING/WOMEN'S HEALTH CLINIC | Facility: IMMUNIZATION CENTER | Age: 81
End: 2021-01-22
Attending: INTERNAL MEDICINE
Payer: MEDICARE

## 2021-01-22 DIAGNOSIS — Z23 ENCOUNTER FOR VACCINATION: Primary | ICD-10-CM

## 2021-01-22 DIAGNOSIS — Z23 NEED FOR VACCINATION: ICD-10-CM

## 2021-01-22 PROCEDURE — 91300 PFIZER SARS-COV-2 VACCINE: CPT

## 2021-01-22 PROCEDURE — 0001A PFIZER SARS-COV-2 VACCINE: CPT

## 2021-02-10 ENCOUNTER — OFFICE VISIT (OUTPATIENT)
Dept: CARDIOLOGY | Facility: MEDICAL CENTER | Age: 81
End: 2021-02-10
Payer: MEDICARE

## 2021-02-10 VITALS
SYSTOLIC BLOOD PRESSURE: 140 MMHG | HEART RATE: 69 BPM | HEIGHT: 61 IN | OXYGEN SATURATION: 98 % | WEIGHT: 121.2 LBS | BODY MASS INDEX: 22.88 KG/M2 | DIASTOLIC BLOOD PRESSURE: 68 MMHG | RESPIRATION RATE: 14 BRPM

## 2021-02-10 DIAGNOSIS — N18.31 STAGE 3A CHRONIC KIDNEY DISEASE: Chronic | ICD-10-CM

## 2021-02-10 DIAGNOSIS — Z91.89 10 YEAR RISK OF MI OR STROKE 7.5% OR GREATER: ICD-10-CM

## 2021-02-10 DIAGNOSIS — I10 ESSENTIAL HYPERTENSION: Chronic | ICD-10-CM

## 2021-02-10 DIAGNOSIS — E78.5 DYSLIPIDEMIA: Chronic | ICD-10-CM

## 2021-02-10 PROBLEM — I49.9 IRREGULAR HEART BEAT: Status: RESOLVED | Noted: 2019-10-22 | Resolved: 2021-02-10

## 2021-02-10 PROCEDURE — 99214 OFFICE O/P EST MOD 30 MIN: CPT | Performed by: INTERNAL MEDICINE

## 2021-02-10 ASSESSMENT — ENCOUNTER SYMPTOMS
WEIGHT LOSS: 0
DEPRESSION: 0
COUGH: 0
VOMITING: 0
SYNCOPE: 0
BLURRED VISION: 0
BACK PAIN: 0
CONSTIPATION: 0
CLAUDICATION: 0
HEARTBURN: 0
ORTHOPNEA: 0
DECREASED APPETITE: 0
SHORTNESS OF BREATH: 0
NAUSEA: 0
DYSPNEA ON EXERTION: 0
FEVER: 0
DIARRHEA: 0
IRREGULAR HEARTBEAT: 0
WEIGHT GAIN: 0
NEAR-SYNCOPE: 0
ALTERED MENTAL STATUS: 0
PALPITATIONS: 0
FLANK PAIN: 0
DIZZINESS: 0
ABDOMINAL PAIN: 0
PND: 0

## 2021-02-10 ASSESSMENT — FIBROSIS 4 INDEX: FIB4 SCORE: 1.03

## 2021-02-10 NOTE — PROGRESS NOTES
Cardiology Note    Chief Complaint   Patient presents with   • HTN (Controlled)   • Transient Ischemic Attack       History of Present Illness: Danielle Jon is a 79 y.o. female PMH HTN, HLD, hypothyroid, CKD who presents for follow up.    Overall feels well. No cardiac complaints. Compliant with medication and denies adverse effects. Home blood pressure stable 110-120/60s. She is following PCP and nephrology for CKD with mild microalbuminuria.     Review of Systems   Constitution: Negative for decreased appetite, fever, malaise/fatigue, weight gain and weight loss.   HENT: Negative for congestion and nosebleeds.    Eyes: Negative for blurred vision.   Cardiovascular: Negative for chest pain, claudication, dyspnea on exertion, irregular heartbeat, leg swelling, near-syncope, orthopnea, palpitations, paroxysmal nocturnal dyspnea and syncope.   Respiratory: Negative for cough and shortness of breath.    Endocrine: Negative for cold intolerance and heat intolerance.   Skin: Negative for rash.   Musculoskeletal: Negative for back pain.   Gastrointestinal: Negative for abdominal pain, constipation, diarrhea, heartburn, melena, nausea and vomiting.   Genitourinary: Negative for dysuria, flank pain and hematuria.   Neurological: Negative for dizziness.   Psychiatric/Behavioral: Negative for altered mental status and depression.         Past Medical History:   Diagnosis Date   • Cataract    • CKD (chronic kidney disease), stage III 11/29/2017   • Essential hypertension 2/19/2016   • Gout of both feet 6/6/2017   • Hypercalcemia 12/14/2017   • Hypertriglyceridemia 4/4/2017   • Hypothyroidism 2/19/2016   • Irregular heart beat 10/22/2019   • Stroke (HCC)     TIA at age 33         Past Surgical History:   Procedure Laterality Date   • CATARACT EXTRACTION WITH IOL Bilateral 2014   • EYE SURGERY      lasix    • MAMMOPLASTY REDUCTION           Current Outpatient Medications   Medication Sig Dispense Refill   • chlorthalidone  (HYGROTON) 50 MG Tab TAKE ONE TABLET BY MOUTH EVERY DAY ( STOP AMLODIPINE AND HCTZ) 90 Tab 0   • losartan (COZAAR) 100 MG Tab TAKE ONE TABLET BY MOUTH EVERY DAY 90 Tab 1   • levothyroxine (SYNTHROID) 50 MCG Tab TAKE ONE TABLET BY MOUTH EVERY MORNING ON AN EMPTY STOMACH 90 Tab 3   • allopurinol (ZYLOPRIM) 100 MG Tab TAKE ONE TABLET BY MOUTH EVERY DAY 90 Tab 1   • atorvastatin (LIPITOR) 20 MG Tab TAKE ONE TABLET BY MOUTH AT BEDTIME 90 Tab 3   • potassium chloride SA (KDUR) 20 MEQ Tab CR TAKE ONE TABLET BY MOUTH TWICE A  Tab 1   • magnesium oxide (MAG-OX) 400 MG Tab Take 400 mg by mouth every day.     • carbamide peroxide (CARBAMOXIDE EAR DROPS) 6.5 % Solution Place 6 Drops in ear 2 times a day. Administer drops in both ears. 1 Bottle 1   • Acetaminophen (TYLENOL 8 HOUR PO) Take  by mouth.     • Cetirizine HCl 10 MG Cap Take  by mouth.     • cyanocobalamin (VITAMIN B-12) 500 MCG Tab Take 500 mcg by mouth every day.     • aspirin 81 MG tablet Take 81 mg by mouth every day.     • Cholecalciferol (VITAMIN D3) 1000 UNITS Cap Take 2,000 Units by mouth every day.       No current facility-administered medications for this visit.         Allergies   Allergen Reactions   • Niacin Rash and Swelling     Rash/itching/swelling  Felt electric shocks, burning, itching         Family History   Problem Relation Age of Onset   • Hypertension Mother    • Heart Disease Mother         chf   • Lung Disease Father    • Hypertension Sister    • Hyperlipidemia Sister    • Hypertension Brother          Social History     Socioeconomic History   • Marital status:      Spouse name: Not on file   • Number of children: Not on file   • Years of education: Not on file   • Highest education level: Not on file   Occupational History   • Not on file   Tobacco Use   • Smoking status: Never Smoker   • Smokeless tobacco: Never Used   Substance and Sexual Activity   • Alcohol use: Yes     Alcohol/week: 8.4 oz     Types: 14 Glasses of wine per  "week     Comment: 2 glasses of wine daily   • Drug use: No   • Sexual activity: Yes     Partners: Male   Other Topics Concern   • Not on file   Social History Narrative    Retired from [email or web address].  Lives with her .  Having some hearing concerns.  Walking without walking assistance, mentally and physically.  Independent. No social or domestic concerns.     Social Determinants of Health     Financial Resource Strain:    • Difficulty of Paying Living Expenses:    Food Insecurity:    • Worried About Running Out of Food in the Last Year:    • Ran Out of Food in the Last Year:    Transportation Needs:    • Lack of Transportation (Medical):    • Lack of Transportation (Non-Medical):    Physical Activity:    • Days of Exercise per Week:    • Minutes of Exercise per Session:    Stress:    • Feeling of Stress :    Social Connections:    • Frequency of Communication with Friends and Family:    • Frequency of Social Gatherings with Friends and Family:    • Attends Rastafari Services:    • Active Member of Clubs or Organizations:    • Attends Club or Organization Meetings:    • Marital Status:    Intimate Partner Violence:    • Fear of Current or Ex-Partner:    • Emotionally Abused:    • Physically Abused:    • Sexually Abused:          Physical Exam:  Ambulatory Vitals  /68 (BP Location: Left arm, Patient Position: Sitting, BP Cuff Size: Adult)   Pulse 69   Resp 14   Ht 1.549 m (5' 1\")   Wt 55 kg (121 lb 3.2 oz)   SpO2 98%    BP Readings from Last 4 Encounters:   02/10/21 140/68   12/03/20 138/58   10/23/20 134/68   08/25/20 116/60     Weight/BMI:   Vitals:    02/10/21 1047   BP: 140/68   Weight: 55 kg (121 lb 3.2 oz)   Height: 1.549 m (5' 1\")    Body mass index is 22.9 kg/m².  Wt Readings from Last 4 Encounters:   02/10/21 55 kg (121 lb 3.2 oz)   12/03/20 56.2 kg (124 lb)   10/23/20 55.3 kg (122 lb)   08/25/20 54 kg (119 lb)       Physical Exam   Constitutional: She is oriented to person, place, and time and " well-developed, well-nourished, and in no distress. No distress.   HENT:   Head: Normocephalic and atraumatic.   Eyes: Pupils are equal, round, and reactive to light. Conjunctivae are normal.   Neck: No JVD present. Carotid bruit is not present.   Cardiovascular: Normal rate, regular rhythm, normal heart sounds and intact distal pulses. Exam reveals no gallop and no friction rub.   No murmur heard.  Pulmonary/Chest: Effort normal and breath sounds normal. No respiratory distress. She has no wheezes. She has no rales. She exhibits no tenderness.   Abdominal: Soft. Bowel sounds are normal. She exhibits no distension.   Musculoskeletal:         General: No edema.      Cervical back: Normal range of motion and neck supple.   Neurological: She is alert and oriented to person, place, and time.   Skin: Skin is warm and dry.   Psychiatric: Affect and judgment normal.       Lab Data Review:  Lab Results   Component Value Date/Time    CHOLSTRLTOT 130 10/20/2020 09:03 AM    LDL 53 10/20/2020 09:03 AM    HDL 62 10/20/2020 09:03 AM    TRIGLYCERIDE 77 10/20/2020 09:03 AM       Lab Results   Component Value Date/Time    SODIUM 140 01/15/2021 01:19 PM    POTASSIUM 4.0 01/15/2021 01:19 PM    CHLORIDE 102 01/15/2021 01:19 PM    CO2 27 01/15/2021 01:19 PM    GLUCOSE 78 01/15/2021 01:19 PM    BUN 19 01/15/2021 01:19 PM    CREATININE 1.12 01/15/2021 01:19 PM     CrCl cannot be calculated (Patient's most recent lab result is older than the maximum 7 days allowed.).  Lab Results   Component Value Date/Time    ALKPHOSPHAT 46 01/15/2020 11:25 AM    ASTSGOT 18 01/15/2020 11:25 AM    ALTSGPT 24 10/20/2020 09:03 AM    TBILIRUBIN 0.9 01/15/2020 11:25 AM      Lab Results   Component Value Date/Time    WBC 6.7 02/12/2020 12:44 PM     No results found for: HBA1C  No components found for: TROP      Cardiac Imaging and Procedures Review:      Renal artery us 12/2019  CONCLUSIONS   Velocities and waveforms are normal through the bilateral renal  arteries..    No evidence of abdominal aortic aneurysm.     Carotid u/s 12/2019  CONCLUSIONS   No prior study is available for comparison..    Normal bilateral carotid exam.     TTE 12/2019  CONCLUSIONS  No prior study is available for comparison.   Normal left ventricular systolic function.  Left ventricular ejection fraction is visually estimated to be 70%.  Normal diastolic function.  The right ventricle was normal in size and function.  No significant valve disease or flow abnormalities.     zio monitor 1/2020  Underlying rhythm: Predominantly sinus. Lowest HR 40 bpm occurring overnight. Average heart rate 71 bpm.  Atrial events: Occasional supraventricular ectopy. Short runs of asymptomatic atrial tachycardia.  Ventricular events: Rare ventricular ectopy.  Patient events: Triggered events showing sinus.    Medical Decision Making:  Problem List Items Addressed This Visit     Essential hypertension (Chronic)    CKD (chronic kidney disease), stage III (HCC) (Chronic)    Dyslipidemia (Chronic)    10 year risk of MI or stroke 7.5% or greater        HTN - controlled home readings. Goal 120/80. Continue regimen. Especially ARB for CKD.    HLD / TIA hx - continue aspirin and statin.    It was my pleasure to meet with Ms. Jon.

## 2021-02-12 ENCOUNTER — IMMUNIZATION (OUTPATIENT)
Dept: FAMILY PLANNING/WOMEN'S HEALTH CLINIC | Facility: IMMUNIZATION CENTER | Age: 81
End: 2021-02-12
Attending: INTERNAL MEDICINE
Payer: MEDICARE

## 2021-02-12 DIAGNOSIS — Z23 ENCOUNTER FOR VACCINATION: Primary | ICD-10-CM

## 2021-02-12 PROCEDURE — 0002A PFIZER SARS-COV-2 VACCINE: CPT

## 2021-02-12 PROCEDURE — 91300 PFIZER SARS-COV-2 VACCINE: CPT

## 2021-03-29 ENCOUNTER — HOSPITAL ENCOUNTER (OUTPATIENT)
Dept: LAB | Facility: MEDICAL CENTER | Age: 81
End: 2021-03-29
Attending: INTERNAL MEDICINE
Payer: MEDICARE

## 2021-03-29 DIAGNOSIS — N18.31 STAGE 3A CHRONIC KIDNEY DISEASE: ICD-10-CM

## 2021-03-29 LAB
25(OH)D3 SERPL-MCNC: 68 NG/ML (ref 30–100)
ANION GAP SERPL CALC-SCNC: 8 MMOL/L (ref 7–16)
BUN SERPL-MCNC: 17 MG/DL (ref 8–22)
CALCIUM SERPL-MCNC: 10.1 MG/DL (ref 8.5–10.5)
CHLORIDE SERPL-SCNC: 98 MMOL/L (ref 96–112)
CO2 SERPL-SCNC: 29 MMOL/L (ref 20–33)
CREAT SERPL-MCNC: 1.13 MG/DL (ref 0.5–1.4)
GLUCOSE SERPL-MCNC: 85 MG/DL (ref 65–99)
POTASSIUM SERPL-SCNC: 4.4 MMOL/L (ref 3.6–5.5)
PTH-INTACT SERPL-MCNC: 101 PG/ML (ref 14–72)
SODIUM SERPL-SCNC: 135 MMOL/L (ref 135–145)

## 2021-03-29 PROCEDURE — 83970 ASSAY OF PARATHORMONE: CPT

## 2021-03-29 PROCEDURE — 80048 BASIC METABOLIC PNL TOTAL CA: CPT

## 2021-03-29 PROCEDURE — 82043 UR ALBUMIN QUANTITATIVE: CPT

## 2021-03-29 PROCEDURE — 82570 ASSAY OF URINE CREATININE: CPT

## 2021-03-29 PROCEDURE — 36415 COLL VENOUS BLD VENIPUNCTURE: CPT

## 2021-03-29 PROCEDURE — 82306 VITAMIN D 25 HYDROXY: CPT

## 2021-03-30 LAB
CREAT UR-MCNC: 78.27 MG/DL
MICROALBUMIN UR-MCNC: 2.2 MG/DL
MICROALBUMIN/CREAT UR: 28 MG/G (ref 0–30)

## 2021-04-01 ENCOUNTER — OFFICE VISIT (OUTPATIENT)
Dept: NEPHROLOGY | Facility: MEDICAL CENTER | Age: 81
End: 2021-04-01
Payer: MEDICARE

## 2021-04-01 VITALS
BODY MASS INDEX: 22.84 KG/M2 | WEIGHT: 121 LBS | HEART RATE: 77 BPM | DIASTOLIC BLOOD PRESSURE: 58 MMHG | HEIGHT: 61 IN | SYSTOLIC BLOOD PRESSURE: 130 MMHG | TEMPERATURE: 98.6 F | RESPIRATION RATE: 16 BRPM | OXYGEN SATURATION: 99 %

## 2021-04-01 DIAGNOSIS — D64.9 ANEMIA, UNSPECIFIED TYPE: ICD-10-CM

## 2021-04-01 DIAGNOSIS — E55.9 VITAMIN D DEFICIENCY: ICD-10-CM

## 2021-04-01 DIAGNOSIS — M10.9 GOUT OF BOTH FEET: ICD-10-CM

## 2021-04-01 DIAGNOSIS — E21.3 HYPERPARATHYROIDISM (HCC): ICD-10-CM

## 2021-04-01 DIAGNOSIS — R80.9 MICROALBUMINURIA: ICD-10-CM

## 2021-04-01 DIAGNOSIS — I10 ESSENTIAL HYPERTENSION: ICD-10-CM

## 2021-04-01 DIAGNOSIS — N18.31 STAGE 3A CHRONIC KIDNEY DISEASE: ICD-10-CM

## 2021-04-01 PROCEDURE — 99214 OFFICE O/P EST MOD 30 MIN: CPT | Performed by: INTERNAL MEDICINE

## 2021-04-01 ASSESSMENT — ENCOUNTER SYMPTOMS
TREMORS: 0
HEADACHES: 0
SORE THROAT: 0
PALPITATIONS: 0
NAUSEA: 0
WHEEZING: 0
ORTHOPNEA: 0
SPEECH CHANGE: 0
VOMITING: 0
NECK PAIN: 0
DIZZINESS: 0
SINUS PAIN: 0
DIARRHEA: 0
MYALGIAS: 0
BACK PAIN: 0
EYES NEGATIVE: 1
FOCAL WEAKNESS: 0
ABDOMINAL PAIN: 0
HEMOPTYSIS: 0
COUGH: 0
FLANK PAIN: 0
FEVER: 0
TINGLING: 0
SENSORY CHANGE: 0
CHILLS: 0
HYPERTENSION: 1
WEIGHT LOSS: 0
SHORTNESS OF BREATH: 0

## 2021-04-01 ASSESSMENT — FIBROSIS 4 INDEX: FIB4 SCORE: 1.03

## 2021-04-01 NOTE — PROGRESS NOTES
Subjective:      Danielle Jon is a 80 y.o. female who presents with Chronic Kidney Disease and Hypertension            Chronic Kidney Disease  Pertinent negatives include no abdominal pain, chest pain, chills, congestion, coughing, fever, headaches, myalgias, nausea, neck pain, sore throat or vomiting.   Hypertension  Pertinent negatives include no chest pain, headaches, malaise/fatigue, neck pain, orthopnea, palpitations or shortness of breath.     Danielle is coming today for HTN, CKD III f/u  Doing well, no complaints  No difficulties to urinate  No dysuria/hematuria/flank pain  HTN: elevated BP improved -well controlled now  BP improved -well controlled now  CKD III - creat level stable at baseline  vit D level under better control  Elevated PTH worse -scheduled parathyroid  nuclear test    Review of Systems   Constitutional: Negative for chills, fever, malaise/fatigue and weight loss.   HENT: Negative for congestion, hearing loss, sinus pain and sore throat.    Eyes: Negative.    Respiratory: Negative for cough, hemoptysis, shortness of breath and wheezing.    Cardiovascular: Negative for chest pain, palpitations, orthopnea and leg swelling.   Gastrointestinal: Negative for abdominal pain, diarrhea, nausea and vomiting.   Genitourinary: Negative for dysuria, flank pain, frequency, hematuria and urgency.   Musculoskeletal: Negative for back pain, joint pain, myalgias and neck pain.   Skin: Negative.    Neurological: Negative for dizziness, tingling, tremors, sensory change, speech change, focal weakness and headaches.   All other systems reviewed and are negative.    Past Medical History:   Diagnosis Date   • Cataract    • CKD (chronic kidney disease), stage III 11/29/2017   • Essential hypertension 2/19/2016   • Gout of both feet 6/6/2017   • Hypercalcemia 12/14/2017   • Hypertriglyceridemia 4/4/2017   • Hypothyroidism 2/19/2016   • Irregular heart beat 10/22/2019   • Stroke (HCC)     TIA at age 33        Family History   Problem Relation Age of Onset   • Hypertension Mother    • Heart Disease Mother         chf   • Lung Disease Father    • Hypertension Sister    • Hyperlipidemia Sister    • Hypertension Brother        Social History     Socioeconomic History   • Marital status:      Spouse name: Not on file   • Number of children: Not on file   • Years of education: Not on file   • Highest education level: Not on file   Occupational History   • Not on file   Tobacco Use   • Smoking status: Never Smoker   • Smokeless tobacco: Never Used   Substance and Sexual Activity   • Alcohol use: Yes     Alcohol/week: 8.4 oz     Types: 14 Glasses of wine per week     Comment: 2 glasses of wine daily   • Drug use: No   • Sexual activity: Yes     Partners: Male   Other Topics Concern   • Not on file   Social History Narrative    Retired from [employer].  Lives with her .  Having some hearing concerns.  Walking without walking assistance, mentally and physically.  Independent. No social or domestic concerns.     Social Determinants of Health     Financial Resource Strain:    • Difficulty of Paying Living Expenses:    Food Insecurity:    • Worried About Running Out of Food in the Last Year:    • Ran Out of Food in the Last Year:    Transportation Needs:    • Lack of Transportation (Medical):    • Lack of Transportation (Non-Medical):    Physical Activity:    • Days of Exercise per Week:    • Minutes of Exercise per Session:    Stress:    • Feeling of Stress :    Social Connections:    • Frequency of Communication with Friends and Family:    • Frequency of Social Gatherings with Friends and Family:    • Attends Pentecostal Services:    • Active Member of Clubs or Organizations:    • Attends Club or Organization Meetings:    • Marital Status:    Intimate Partner Violence:    • Fear of Current or Ex-Partner:    • Emotionally Abused:    • Physically Abused:    • Sexually Abused:           Objective:     /58 (BP  "Location: Right arm, Patient Position: Sitting)   Pulse 77   Temp 37 °C (98.6 °F) (Temporal)   Resp 16   Ht 1.549 m (5' 1\")   Wt 54.9 kg (121 lb)   SpO2 99%   BMI 22.86 kg/m²      Physical Exam  Vitals and nursing note reviewed.   Constitutional:       General: She is not in acute distress.     Appearance: Normal appearance. She is well-developed. She is not diaphoretic.   HENT:      Head: Normocephalic and atraumatic.      Nose: Nose normal.      Mouth/Throat:      Mouth: Mucous membranes are moist.      Pharynx: Oropharynx is clear.   Eyes:      Extraocular Movements: Extraocular movements intact.      Conjunctiva/sclera: Conjunctivae normal.      Pupils: Pupils are equal, round, and reactive to light.   Cardiovascular:      Rate and Rhythm: Normal rate and regular rhythm.      Pulses: Normal pulses.      Heart sounds: Normal heart sounds. No friction rub. No gallop.    Pulmonary:      Effort: Pulmonary effort is normal. No respiratory distress.      Breath sounds: Normal breath sounds. No wheezing, rhonchi or rales.   Abdominal:      General: Bowel sounds are normal. There is no distension.      Palpations: Abdomen is soft. There is no mass.      Tenderness: There is no right CVA tenderness or left CVA tenderness.   Musculoskeletal:         General: Normal range of motion.      Cervical back: Normal range of motion and neck supple.      Right lower leg: No edema.      Left lower leg: No edema.   Skin:     General: Skin is warm.      Findings: No erythema or rash.   Neurological:      General: No focal deficit present.      Mental Status: She is alert and oriented to person, place, and time.      Cranial Nerves: No cranial nerve deficit.      Coordination: Coordination normal.   Psychiatric:         Mood and Affect: Mood normal.         Behavior: Behavior normal.         Thought Content: Thought content normal.         Judgment: Judgment normal.               Laboratory results reviewed: d/w Pt  Lab Results "   Component Value Date/Time    CREATININE 1.13 03/29/2021 10:23 AM    POTASSIUM 4.4 03/29/2021 10:23 AM       Assessment/Plan:     1. CKD (chronic kidney disease), stage III      creat level stable at baseline -to monitor      2. Essential hypertension      BP well controlled - continue current treatment    3. Vit D def      Low vit D level - improved -continue supplementation      elevated PTH - worse despite good vit D control     -continue vit D     -scheduled nuclear parathyroid imaging     4. Anemia : Hb level WNL    5. Gout; uric acid well controlled with allopurinol      To monitor    6. Microalbuminuria mild -controlled with ARB  Recs: Continue current medications, low Na diet, monitor BP               F/u in 6 months with BMP, vit D, PTH, urine microalb/creat ratio, CBC             Call clinic once nuclear imaging completed to discuss results

## 2021-04-07 ENCOUNTER — HOSPITAL ENCOUNTER (OUTPATIENT)
Dept: RADIOLOGY | Facility: MEDICAL CENTER | Age: 81
End: 2021-04-07
Attending: INTERNAL MEDICINE
Payer: MEDICARE

## 2021-04-07 DIAGNOSIS — E21.3 HYPERPARATHYROIDISM (HCC): ICD-10-CM

## 2021-04-07 PROCEDURE — A9500 TC99M SESTAMIBI: HCPCS

## 2021-04-09 ENCOUNTER — TELEPHONE (OUTPATIENT)
Dept: NEPHROLOGY | Facility: MEDICAL CENTER | Age: 81
End: 2021-04-09

## 2021-04-23 ENCOUNTER — HOSPITAL ENCOUNTER (OUTPATIENT)
Dept: LAB | Facility: MEDICAL CENTER | Age: 81
End: 2021-04-23
Attending: INTERNAL MEDICINE
Payer: MEDICARE

## 2021-04-23 DIAGNOSIS — E78.5 DYSLIPIDEMIA: Chronic | ICD-10-CM

## 2021-04-23 DIAGNOSIS — E03.9 HYPOTHYROIDISM, UNSPECIFIED TYPE: Chronic | ICD-10-CM

## 2021-04-23 DIAGNOSIS — N18.31 STAGE 3A CHRONIC KIDNEY DISEASE: Chronic | ICD-10-CM

## 2021-04-23 DIAGNOSIS — I10 ESSENTIAL HYPERTENSION: Chronic | ICD-10-CM

## 2021-04-23 LAB
ALT SERPL-CCNC: 19 U/L (ref 2–50)
ANION GAP SERPL CALC-SCNC: 8 MMOL/L (ref 7–16)
BASOPHILS # BLD AUTO: 0.6 % (ref 0–1.8)
BASOPHILS # BLD: 0.03 K/UL (ref 0–0.12)
BUN SERPL-MCNC: 18 MG/DL (ref 8–22)
CALCIUM SERPL-MCNC: 10.4 MG/DL (ref 8.5–10.5)
CHLORIDE SERPL-SCNC: 95 MMOL/L (ref 96–112)
CHOLEST SERPL-MCNC: 150 MG/DL (ref 100–199)
CO2 SERPL-SCNC: 28 MMOL/L (ref 20–33)
CREAT SERPL-MCNC: 1.01 MG/DL (ref 0.5–1.4)
EOSINOPHIL # BLD AUTO: 0.06 K/UL (ref 0–0.51)
EOSINOPHIL NFR BLD: 1.1 % (ref 0–6.9)
ERYTHROCYTE [DISTWIDTH] IN BLOOD BY AUTOMATED COUNT: 45.1 FL (ref 35.9–50)
FASTING STATUS PATIENT QL REPORTED: NORMAL
GLUCOSE SERPL-MCNC: 94 MG/DL (ref 65–99)
HCT VFR BLD AUTO: 43.4 % (ref 37–47)
HDLC SERPL-MCNC: 78 MG/DL
HGB BLD-MCNC: 15.2 G/DL (ref 12–16)
IMM GRANULOCYTES # BLD AUTO: 0.02 K/UL (ref 0–0.11)
IMM GRANULOCYTES NFR BLD AUTO: 0.4 % (ref 0–0.9)
LDLC SERPL CALC-MCNC: 54 MG/DL
LYMPHOCYTES # BLD AUTO: 1.16 K/UL (ref 1–4.8)
LYMPHOCYTES NFR BLD: 21.6 % (ref 22–41)
MCH RBC QN AUTO: 34.8 PG (ref 27–33)
MCHC RBC AUTO-ENTMCNC: 35 G/DL (ref 33.6–35)
MCV RBC AUTO: 99.3 FL (ref 81.4–97.8)
MONOCYTES # BLD AUTO: 0.38 K/UL (ref 0–0.85)
MONOCYTES NFR BLD AUTO: 7.1 % (ref 0–13.4)
NEUTROPHILS # BLD AUTO: 3.71 K/UL (ref 2–7.15)
NEUTROPHILS NFR BLD: 69.2 % (ref 44–72)
NRBC # BLD AUTO: 0 K/UL
NRBC BLD-RTO: 0 /100 WBC
PLATELET # BLD AUTO: 229 K/UL (ref 164–446)
PMV BLD AUTO: 11.3 FL (ref 9–12.9)
POTASSIUM SERPL-SCNC: 3.5 MMOL/L (ref 3.6–5.5)
RBC # BLD AUTO: 4.37 M/UL (ref 4.2–5.4)
SODIUM SERPL-SCNC: 131 MMOL/L (ref 135–145)
TRIGL SERPL-MCNC: 89 MG/DL (ref 0–149)
WBC # BLD AUTO: 5.4 K/UL (ref 4.8–10.8)

## 2021-04-23 PROCEDURE — 85025 COMPLETE CBC W/AUTO DIFF WBC: CPT

## 2021-04-23 PROCEDURE — 82607 VITAMIN B-12: CPT

## 2021-04-23 PROCEDURE — 80061 LIPID PANEL: CPT

## 2021-04-23 PROCEDURE — 84443 ASSAY THYROID STIM HORMONE: CPT

## 2021-04-23 PROCEDURE — 82306 VITAMIN D 25 HYDROXY: CPT

## 2021-04-23 PROCEDURE — 80048 BASIC METABOLIC PNL TOTAL CA: CPT

## 2021-04-23 PROCEDURE — 36415 COLL VENOUS BLD VENIPUNCTURE: CPT

## 2021-04-23 PROCEDURE — 84460 ALANINE AMINO (ALT) (SGPT): CPT

## 2021-04-24 LAB
25(OH)D3 SERPL-MCNC: 68 NG/ML (ref 30–80)
TSH SERPL DL<=0.005 MIU/L-ACNC: 3.33 UIU/ML (ref 0.38–5.33)
VIT B12 SERPL-MCNC: 909 PG/ML (ref 211–911)

## 2021-04-25 DIAGNOSIS — E87.6 HYPOKALEMIA: ICD-10-CM

## 2021-04-25 DIAGNOSIS — E87.1 HYPONATREMIA: ICD-10-CM

## 2021-04-26 ENCOUNTER — OFFICE VISIT (OUTPATIENT)
Dept: MEDICAL GROUP | Facility: PHYSICIAN GROUP | Age: 81
End: 2021-04-26
Payer: MEDICARE

## 2021-04-26 VITALS
SYSTOLIC BLOOD PRESSURE: 134 MMHG | BODY MASS INDEX: 22.66 KG/M2 | TEMPERATURE: 99.5 F | HEART RATE: 75 BPM | DIASTOLIC BLOOD PRESSURE: 66 MMHG | WEIGHT: 120 LBS | OXYGEN SATURATION: 95 % | HEIGHT: 61 IN | RESPIRATION RATE: 16 BRPM

## 2021-04-26 DIAGNOSIS — E87.1 HYPONATREMIA: Chronic | ICD-10-CM

## 2021-04-26 DIAGNOSIS — M10.9 GOUT OF BOTH FEET: ICD-10-CM

## 2021-04-26 DIAGNOSIS — I10 ESSENTIAL HYPERTENSION: Chronic | ICD-10-CM

## 2021-04-26 DIAGNOSIS — E78.5 DYSLIPIDEMIA: Chronic | ICD-10-CM

## 2021-04-26 DIAGNOSIS — E83.42 HYPOMAGNESEMIA: ICD-10-CM

## 2021-04-26 DIAGNOSIS — E03.9 HYPOTHYROIDISM, UNSPECIFIED TYPE: Chronic | ICD-10-CM

## 2021-04-26 DIAGNOSIS — E87.6 HYPOKALEMIA: Chronic | ICD-10-CM

## 2021-04-26 DIAGNOSIS — N18.31 STAGE 3A CHRONIC KIDNEY DISEASE: ICD-10-CM

## 2021-04-26 PROBLEM — Z51.81 ENCOUNTER FOR THERAPEUTIC DRUG MONITORING: Status: RESOLVED | Noted: 2019-10-22 | Resolved: 2021-04-26

## 2021-04-26 PROBLEM — Z00.00 WELLNESS EXAMINATION: Status: RESOLVED | Noted: 2018-10-18 | Resolved: 2021-04-26

## 2021-04-26 PROBLEM — R53.83 OTHER FATIGUE: Status: RESOLVED | Noted: 2019-10-22 | Resolved: 2021-04-26

## 2021-04-26 PROBLEM — Z78.9 ALCOHOL USE: Status: RESOLVED | Noted: 2019-10-22 | Resolved: 2021-04-26

## 2021-04-26 PROCEDURE — 99214 OFFICE O/P EST MOD 30 MIN: CPT | Performed by: INTERNAL MEDICINE

## 2021-04-26 RX ORDER — VITAMIN B COMPLEX
1000 TABLET ORAL DAILY
COMMUNITY
End: 2021-10-26

## 2021-04-26 ASSESSMENT — PATIENT HEALTH QUESTIONNAIRE - PHQ9: CLINICAL INTERPRETATION OF PHQ2 SCORE: 0

## 2021-04-26 ASSESSMENT — FIBROSIS 4 INDEX: FIB4 SCORE: 1.42

## 2021-04-26 NOTE — ASSESSMENT & PLAN NOTE
Chronic condition.  Patient followed by nephrology service.  Baseline GFR in the 50s.  Advised the patient to avoid NSAIDs.

## 2021-04-26 NOTE — ASSESSMENT & PLAN NOTE
Blood test show slightly low level of 131  Patient asymptomatic.  Recommend to repeat sodium and serum osmolality in 1 week.

## 2021-04-26 NOTE — ASSESSMENT & PLAN NOTE
Patient is currently taking chlorthalidone and losartan.  Blood pressure has been well controlled.  No significant side effects reported.

## 2021-04-26 NOTE — PROGRESS NOTES
CC: Follow-up high blood pressure  Lab test results      HPI: This is a 80 y.o. pt.  Pt's medical history is notable for:     Hypokalemia  Recent blood test show potassium of 3.5  Patient asymptomatic.  Chart review showed the patient is taking chlorthalidone 50 mg daily and potassium chloride 20 M EQ daily.  Patient is not interested in increase the dose of potassium as the size of tablet is rather large and causing some difficulty with swallowing.  Recommend patient to eat addition of bananas each day and to repeat potassium and magnesium blood test in approximately 1 week.    Hyponatremia  Blood test show slightly low level of 131  Patient asymptomatic.  Recommend to repeat sodium and serum osmolality in 1 week.    CKD (chronic kidney disease), stage III (HCC)  Chronic condition.  Patient followed by nephrology service.  Baseline GFR in the 50s.  Advised the patient to avoid NSAIDs.    Dyslipidemia  Patient currently taking atorvastatin daily.  Recent lipid panel is within the good range.    Essential hypertension  Patient is currently taking chlorthalidone and losartan.  Blood pressure has been well controlled.  No significant side effects reported.    Hypothyroidism  Patient is presently taking levothyroxine.  Recent TSH is within the acceptable range.    Gout of both feet  Chronic condition.  The patient taking allopurinol.  Patient asymptomatic.  Uric acid ordered.      Results for BILL WINTERS (MRN [id number]) as of 4/26/2021 09:31   Ref. Range 4/23/2021 09:40   WBC Latest Ref Range: 4.8 - 10.8 K/uL 5.4   RBC Latest Ref Range: 4.20 - 5.40 M/uL 4.37   Hemoglobin Latest Ref Range: 12.0 - 16.0 g/dL 15.2   Hematocrit Latest Ref Range: 37.0 - 47.0 % 43.4   MCV Latest Ref Range: 81.4 - 97.8 fL 99.3 (H)   MCH Latest Ref Range: 27.0 - 33.0 pg 34.8 (H)   MCHC Latest Ref Range: 33.6 - 35.0 g/dL 35.0   RDW Latest Ref Range: 35.9 - 50.0 fL 45.1   Platelet Count Latest Ref Range: 164 - 446 K/uL 229   MPV Latest Ref  Range: 9.0 - 12.9 fL 11.3   Neutrophils-Polys Latest Ref Range: 44.00 - 72.00 % 69.20   Neutrophils (Absolute) Latest Ref Range: 2.00 - 7.15 K/uL 3.71   Lymphocytes Latest Ref Range: 22.00 - 41.00 % 21.60 (L)   Lymphs (Absolute) Latest Ref Range: 1.00 - 4.80 K/uL 1.16   Monocytes Latest Ref Range: 0.00 - 13.40 % 7.10   Monos (Absolute) Latest Ref Range: 0.00 - 0.85 K/uL 0.38   Eosinophils Latest Ref Range: 0.00 - 6.90 % 1.10   Eos (Absolute) Latest Ref Range: 0.00 - 0.51 K/uL 0.06   Basophils Latest Ref Range: 0.00 - 1.80 % 0.60   Baso (Absolute) Latest Ref Range: 0.00 - 0.12 K/uL 0.03   Immature Granulocytes Latest Ref Range: 0.00 - 0.90 % 0.40   Immature Granulocytes (abs) Latest Ref Range: 0.00 - 0.11 K/uL 0.02   Nucleated RBC Latest Units: /100 WBC 0.00   NRBC (Absolute) Latest Units: K/uL 0.00   Sodium Latest Ref Range: 135 - 145 mmol/L 131 (L)   Potassium Latest Ref Range: 3.6 - 5.5 mmol/L 3.5 (L)   Chloride Latest Ref Range: 96 - 112 mmol/L 95 (L)   Co2 Latest Ref Range: 20 - 33 mmol/L 28   Anion Gap Latest Ref Range: 7.0 - 16.0  8.0   Glucose Latest Ref Range: 65 - 99 mg/dL 94   Bun Latest Ref Range: 8 - 22 mg/dL 18   Creatinine Latest Ref Range: 0.50 - 1.40 mg/dL 1.01   GFR If  Latest Ref Range: >60 mL/min/1.73 m 2 >60   GFR If Non  Latest Ref Range: >60 mL/min/1.73 m 2 53 (A)   Calcium Latest Ref Range: 8.5 - 10.5 mg/dL 10.4   ALT(SGPT) Latest Ref Range: 2 - 50 U/L 19   Cholesterol,Tot Latest Ref Range: 100 - 199 mg/dL 150   Triglycerides Latest Ref Range: 0 - 149 mg/dL 89   HDL Latest Ref Range: >=40 mg/dL 78   LDL Latest Ref Range: <100 mg/dL 54   25-Hydroxy   Vitamin D 25 Latest Ref Range: 30 - 80 ng/mL 68   Vitamin B12 -True Cobalamin Latest Ref Range: 211 - 911 pg/mL 909   TSH Latest Ref Range: 0.380 - 5.330 uIU/mL 3.330       REVIEW OF SYSTEMS:     Constitutional:  no fever / chills   Neurologic: no headaches  Eyes: no changes in vision  ENT: no sore throat, no hearing  loss  CV:  no chest pain, no palpitations  Pulmonary: no SOB, no cough          Allergies: Niacin    Current Outpatient Medications Ordered in Epic   Medication Sig Dispense Refill   • chlorthalidone (HYGROTON) 50 MG Tab TAKE ONE TABLET BY MOUTH EVERY DAY ( STOP AMLODIPINE AND HCTZ) 90 Tab 0   • losartan (COZAAR) 100 MG Tab TAKE ONE TABLET BY MOUTH EVERY DAY 90 Tab 1   • levothyroxine (SYNTHROID) 50 MCG Tab TAKE ONE TABLET BY MOUTH EVERY MORNING ON AN EMPTY STOMACH 90 Tab 3   • allopurinol (ZYLOPRIM) 100 MG Tab TAKE ONE TABLET BY MOUTH EVERY DAY 90 Tab 1   • atorvastatin (LIPITOR) 20 MG Tab TAKE ONE TABLET BY MOUTH AT BEDTIME 90 Tab 3   • potassium chloride SA (KDUR) 20 MEQ Tab CR TAKE ONE TABLET BY MOUTH TWICE A  Tab 1   • cyanocobalamin (VITAMIN B-12) 500 MCG Tab Take 500 mcg by mouth every day.     • aspirin 81 MG tablet Take 81 mg by mouth every day.     • vitamin D (CHOLECALCIFEROL) 1000 Unit (25 mcg) Tab Take 1,000 Units by mouth every day.     • magnesium oxide (MAG-OX) 400 MG Tab Take 400 mg by mouth every day.     • carbamide peroxide (CARBAMOXIDE EAR DROPS) 6.5 % Solution Place 6 Drops in ear 2 times a day. Administer drops in both ears. 1 Bottle 1   • Acetaminophen (TYLENOL 8 HOUR PO) Take  by mouth.     • Cetirizine HCl 10 MG Cap Take  by mouth.       No current Epic-ordered facility-administered medications on file.       Past Medical, Social, and Family history reviewed and updated in EPIC     ------------------------------------------------------------------------------     PHYSICAL EXAM:   Vitals:    04/26/21 0914   BP: 134/66   Pulse: 75   Resp: 16   Temp: 37.5 °C (99.5 °F)   SpO2: 95%      Body mass index is 22.67 kg/m².         Constitutional: no acute distress  Neck: supple, no JVD  CV: heart RRR  Resp: normal effort, no wheezing or rales.  GI: abdomen soft, no obvious mass, no tenderness  Neuro: CN 2-12 grossly  intact        -----------------------------------------------------------------------------    ASSESSMENT:   1. Gout of both feet  URIC ACID   2. Hypomagnesemia  MAGNESIUM   3. Hypokalemia     4. Hyponatremia     5. Stage 3a chronic kidney disease     6. Dyslipidemia     7. Essential hypertension     8. Hypothyroidism, unspecified type             MEDICAL DECISION MAKING: DISCUSSION / STATUS / PLAN:    Medically patient appears stable.  Lab test result discussed with the patient as above.  Recommend patient to repeat sodium potassium magnesium in 7 days.  Continue follow-up with nephrology service as directed.     Return in about 6 months (around 10/26/2021).     -If any problems should arise, patient was advised to contact our office or go to ER to be evaluated.    Please note that this dictation was created using voice recognition software. I have made every reasonable attempt to correct obvious errors, but it is possible there are errors of grammar and possibly content that I did not discover before finalizing the note.

## 2021-04-26 NOTE — ASSESSMENT & PLAN NOTE
Recent blood test show potassium of 3.5  Patient asymptomatic.  Chart review showed the patient is taking chlorthalidone 50 mg daily and potassium chloride 20 M EQ daily.  Patient is not interested in increase the dose of potassium as the size of tablet is rather large and causing some difficulty with swallowing.  Recommend patient to eat addition of bananas each day and to repeat potassium and magnesium blood test in approximately 1 week.

## 2021-04-28 DIAGNOSIS — I10 ESSENTIAL HYPERTENSION: Chronic | ICD-10-CM

## 2021-04-29 RX ORDER — CHLORTHALIDONE 50 MG/1
TABLET ORAL
Qty: 90 TABLET | Refills: 2 | Status: SHIPPED | OUTPATIENT
Start: 2021-04-29 | End: 2022-02-02

## 2021-05-06 ENCOUNTER — HOSPITAL ENCOUNTER (OUTPATIENT)
Dept: LAB | Facility: MEDICAL CENTER | Age: 81
End: 2021-05-06
Attending: INTERNAL MEDICINE
Payer: MEDICARE

## 2021-05-06 DIAGNOSIS — D64.9 ANEMIA, UNSPECIFIED TYPE: ICD-10-CM

## 2021-05-06 DIAGNOSIS — N18.31 STAGE 3A CHRONIC KIDNEY DISEASE: ICD-10-CM

## 2021-05-06 DIAGNOSIS — E83.42 HYPOMAGNESEMIA: ICD-10-CM

## 2021-05-06 DIAGNOSIS — E21.3 HYPERPARATHYROIDISM (HCC): ICD-10-CM

## 2021-05-06 DIAGNOSIS — M10.9 GOUT OF BOTH FEET: ICD-10-CM

## 2021-05-06 DIAGNOSIS — E87.6 HYPOKALEMIA: ICD-10-CM

## 2021-05-06 DIAGNOSIS — E87.1 HYPONATREMIA: ICD-10-CM

## 2021-05-06 LAB
ANION GAP SERPL CALC-SCNC: 9 MMOL/L (ref 7–16)
APPEARANCE UR: CLEAR
BILIRUB UR QL STRIP.AUTO: NEGATIVE
BUN SERPL-MCNC: 19 MG/DL (ref 8–22)
CALCIUM SERPL-MCNC: 10.1 MG/DL (ref 8.5–10.5)
CHLORIDE SERPL-SCNC: 96 MMOL/L (ref 96–112)
CO2 SERPL-SCNC: 28 MMOL/L (ref 20–33)
COLOR UR: YELLOW
CREAT SERPL-MCNC: 1.14 MG/DL (ref 0.5–1.4)
ERYTHROCYTE [DISTWIDTH] IN BLOOD BY AUTOMATED COUNT: 45.6 FL (ref 35.9–50)
FASTING STATUS PATIENT QL REPORTED: NORMAL
GLUCOSE SERPL-MCNC: 76 MG/DL (ref 65–99)
GLUCOSE UR STRIP.AUTO-MCNC: NEGATIVE MG/DL
HCT VFR BLD AUTO: 40 % (ref 37–47)
HGB BLD-MCNC: 13.9 G/DL (ref 12–16)
KETONES UR STRIP.AUTO-MCNC: NEGATIVE MG/DL
LEUKOCYTE ESTERASE UR QL STRIP.AUTO: NEGATIVE
MAGNESIUM SERPL-MCNC: 1.9 MG/DL (ref 1.5–2.5)
MCH RBC QN AUTO: 34.8 PG (ref 27–33)
MCHC RBC AUTO-ENTMCNC: 34.8 G/DL (ref 33.6–35)
MCV RBC AUTO: 100 FL (ref 81.4–97.8)
MICRO URNS: NORMAL
NITRITE UR QL STRIP.AUTO: NEGATIVE
OSMOLALITY SERPL: 281 MOSM/KG H2O (ref 278–298)
PH UR STRIP.AUTO: 8 [PH] (ref 5–8)
PHOSPHATE SERPL-MCNC: 2.8 MG/DL (ref 2.5–4.5)
PLATELET # BLD AUTO: 251 K/UL (ref 164–446)
PMV BLD AUTO: 10.4 FL (ref 9–12.9)
POTASSIUM SERPL-SCNC: 4.3 MMOL/L (ref 3.6–5.5)
PROT UR QL STRIP: NEGATIVE MG/DL
PTH-INTACT SERPL-MCNC: 70.5 PG/ML (ref 14–72)
RBC # BLD AUTO: 4 M/UL (ref 4.2–5.4)
RBC UR QL AUTO: NEGATIVE
SODIUM SERPL-SCNC: 133 MMOL/L (ref 135–145)
SP GR UR STRIP.AUTO: 1.01
URATE SERPL-MCNC: 4.7 MG/DL (ref 1.9–8.2)
UROBILINOGEN UR STRIP.AUTO-MCNC: 0.2 MG/DL
WBC # BLD AUTO: 6.5 K/UL (ref 4.8–10.8)

## 2021-05-06 PROCEDURE — 84550 ASSAY OF BLOOD/URIC ACID: CPT

## 2021-05-06 PROCEDURE — 81003 URINALYSIS AUTO W/O SCOPE: CPT

## 2021-05-06 PROCEDURE — 83735 ASSAY OF MAGNESIUM: CPT

## 2021-05-06 PROCEDURE — 80048 BASIC METABOLIC PNL TOTAL CA: CPT

## 2021-05-06 PROCEDURE — 36415 COLL VENOUS BLD VENIPUNCTURE: CPT

## 2021-05-06 PROCEDURE — 84100 ASSAY OF PHOSPHORUS: CPT

## 2021-05-06 PROCEDURE — 83930 ASSAY OF BLOOD OSMOLALITY: CPT

## 2021-05-06 PROCEDURE — 85027 COMPLETE CBC AUTOMATED: CPT

## 2021-05-06 PROCEDURE — 83970 ASSAY OF PARATHORMONE: CPT

## 2021-05-07 PROBLEM — E87.6 HYPOKALEMIA: Chronic | Status: RESOLVED | Noted: 2021-04-25 | Resolved: 2021-05-07

## 2021-05-07 PROBLEM — E87.1 HYPONATREMIA: Chronic | Status: RESOLVED | Noted: 2021-04-25 | Resolved: 2021-05-07

## 2021-05-20 DIAGNOSIS — M10.9 GOUT OF BOTH FEET: ICD-10-CM

## 2021-05-20 RX ORDER — ALLOPURINOL 100 MG/1
TABLET ORAL
Qty: 90 TABLET | Refills: 1 | Status: SHIPPED | OUTPATIENT
Start: 2021-05-20 | End: 2021-11-23

## 2021-07-22 DIAGNOSIS — I10 ESSENTIAL HYPERTENSION: ICD-10-CM

## 2021-07-23 RX ORDER — LOSARTAN POTASSIUM 100 MG/1
TABLET ORAL
Qty: 90 TABLET | Refills: 1 | Status: SHIPPED | OUTPATIENT
Start: 2021-07-23 | End: 2022-01-20

## 2021-07-23 NOTE — TELEPHONE ENCOUNTER
Last seen by PCP 04/26/2021.    4/26/21 4/1/21 2/10/21   Blood Pressure  134/66 130/58 140/68         Lab Results   Component Value Date/Time    SODIUM 133 (L) 05/06/2021 11:31 AM    POTASSIUM 4.3 05/06/2021 11:31 AM    CHLORIDE 96 05/06/2021 11:31 AM    CO2 28 05/06/2021 11:31 AM    GLUCOSE 76 05/06/2021 11:31 AM    BUN 19 05/06/2021 11:31 AM    CREATININE 1.14 05/06/2021 11:31 AM

## 2021-08-04 DIAGNOSIS — Z51.81 MEDICATION MONITORING ENCOUNTER: ICD-10-CM

## 2021-08-04 DIAGNOSIS — E87.6 HYPOKALEMIA: ICD-10-CM

## 2021-08-09 RX ORDER — POTASSIUM CHLORIDE 20 MEQ/1
TABLET, EXTENDED RELEASE ORAL
Qty: 180 TABLET | Refills: 1 | Status: SHIPPED | OUTPATIENT
Start: 2021-08-09 | End: 2022-08-03

## 2021-08-09 NOTE — TELEPHONE ENCOUNTER
Refill X 6 months, sent to pharmacy.Pt. Seen in the last 6 months per protocol.   Lab Results   Component Value Date/Time    SODIUM 133 (L) 05/06/2021 11:31 AM    POTASSIUM 4.3 05/06/2021 11:31 AM    CHLORIDE 96 05/06/2021 11:31 AM    CO2 28 05/06/2021 11:31 AM    GLUCOSE 76 05/06/2021 11:31 AM    BUN 19 05/06/2021 11:31 AM    CREATININE 1.14 05/06/2021 11:31 AM

## 2021-10-14 DIAGNOSIS — N18.31 STAGE 3A CHRONIC KIDNEY DISEASE: ICD-10-CM

## 2021-10-14 DIAGNOSIS — E55.9 VITAMIN D DEFICIENCY: ICD-10-CM

## 2021-10-14 DIAGNOSIS — R80.9 MICROALBUMINURIA: ICD-10-CM

## 2021-10-14 DIAGNOSIS — E21.1 HYPERPARATHYROIDISM DUE TO VITAMIN D DEFICIENCY (HCC): ICD-10-CM

## 2021-10-14 DIAGNOSIS — M10.9 GOUT OF BOTH FEET: ICD-10-CM

## 2021-10-14 DIAGNOSIS — D64.9 ANEMIA, UNSPECIFIED TYPE: ICD-10-CM

## 2021-10-14 DIAGNOSIS — I10 ESSENTIAL HYPERTENSION: ICD-10-CM

## 2021-10-15 ENCOUNTER — HOSPITAL ENCOUNTER (OUTPATIENT)
Dept: LAB | Facility: MEDICAL CENTER | Age: 81
End: 2021-10-15
Attending: INTERNAL MEDICINE
Payer: MEDICARE

## 2021-10-15 DIAGNOSIS — R80.9 MICROALBUMINURIA: ICD-10-CM

## 2021-10-15 DIAGNOSIS — D64.9 ANEMIA, UNSPECIFIED TYPE: ICD-10-CM

## 2021-10-15 DIAGNOSIS — E21.1 HYPERPARATHYROIDISM DUE TO VITAMIN D DEFICIENCY (HCC): ICD-10-CM

## 2021-10-15 DIAGNOSIS — N18.31 STAGE 3A CHRONIC KIDNEY DISEASE: ICD-10-CM

## 2021-10-15 LAB
25(OH)D3 SERPL-MCNC: 97 NG/ML (ref 30–100)
ANION GAP SERPL CALC-SCNC: 10 MMOL/L (ref 7–16)
APPEARANCE UR: CLEAR
BILIRUB UR QL STRIP.AUTO: NEGATIVE
BUN SERPL-MCNC: 15 MG/DL (ref 8–22)
CALCIUM SERPL-MCNC: 10.2 MG/DL (ref 8.5–10.5)
CHLORIDE SERPL-SCNC: 99 MMOL/L (ref 96–112)
CO2 SERPL-SCNC: 28 MMOL/L (ref 20–33)
COLOR UR: YELLOW
CREAT SERPL-MCNC: 0.96 MG/DL (ref 0.5–1.4)
CREAT UR-MCNC: 61.21 MG/DL
ERYTHROCYTE [DISTWIDTH] IN BLOOD BY AUTOMATED COUNT: 47.3 FL (ref 35.9–50)
FASTING STATUS PATIENT QL REPORTED: NORMAL
GLUCOSE SERPL-MCNC: 91 MG/DL (ref 65–99)
GLUCOSE UR STRIP.AUTO-MCNC: NEGATIVE MG/DL
HCT VFR BLD AUTO: 41.1 % (ref 37–47)
HGB BLD-MCNC: 14.3 G/DL (ref 12–16)
KETONES UR STRIP.AUTO-MCNC: NEGATIVE MG/DL
LEUKOCYTE ESTERASE UR QL STRIP.AUTO: NEGATIVE
MCH RBC QN AUTO: 34.9 PG (ref 27–33)
MCHC RBC AUTO-ENTMCNC: 34.8 G/DL (ref 33.6–35)
MCV RBC AUTO: 100.2 FL (ref 81.4–97.8)
MICRO URNS: NORMAL
MICROALBUMIN UR-MCNC: <1.2 MG/DL
MICROALBUMIN/CREAT UR: NORMAL MG/G (ref 0–30)
NITRITE UR QL STRIP.AUTO: NEGATIVE
PH UR STRIP.AUTO: 8 [PH] (ref 5–8)
PLATELET # BLD AUTO: 243 K/UL (ref 164–446)
PMV BLD AUTO: 10.6 FL (ref 9–12.9)
POTASSIUM SERPL-SCNC: 3.8 MMOL/L (ref 3.6–5.5)
PROT UR QL STRIP: NEGATIVE MG/DL
PTH-INTACT SERPL-MCNC: 72.9 PG/ML (ref 14–72)
RBC # BLD AUTO: 4.1 M/UL (ref 4.2–5.4)
RBC UR QL AUTO: NEGATIVE
SODIUM SERPL-SCNC: 137 MMOL/L (ref 135–145)
SP GR UR STRIP.AUTO: 1.01
URATE SERPL-MCNC: 3.9 MG/DL (ref 1.9–8.2)
UROBILINOGEN UR STRIP.AUTO-MCNC: 0.2 MG/DL
WBC # BLD AUTO: 8.9 K/UL (ref 4.8–10.8)

## 2021-10-15 PROCEDURE — 85027 COMPLETE CBC AUTOMATED: CPT

## 2021-10-15 PROCEDURE — 83970 ASSAY OF PARATHORMONE: CPT

## 2021-10-15 PROCEDURE — 80048 BASIC METABOLIC PNL TOTAL CA: CPT

## 2021-10-15 PROCEDURE — 82043 UR ALBUMIN QUANTITATIVE: CPT

## 2021-10-15 PROCEDURE — 82306 VITAMIN D 25 HYDROXY: CPT

## 2021-10-15 PROCEDURE — 84550 ASSAY OF BLOOD/URIC ACID: CPT

## 2021-10-15 PROCEDURE — 81003 URINALYSIS AUTO W/O SCOPE: CPT

## 2021-10-15 PROCEDURE — 82570 ASSAY OF URINE CREATININE: CPT

## 2021-10-15 PROCEDURE — 36415 COLL VENOUS BLD VENIPUNCTURE: CPT

## 2021-10-20 ENCOUNTER — OFFICE VISIT (OUTPATIENT)
Dept: NEPHROLOGY | Facility: MEDICAL CENTER | Age: 81
End: 2021-10-20
Payer: MEDICARE

## 2021-10-20 VITALS
TEMPERATURE: 98.1 F | WEIGHT: 119 LBS | SYSTOLIC BLOOD PRESSURE: 112 MMHG | BODY MASS INDEX: 21.9 KG/M2 | DIASTOLIC BLOOD PRESSURE: 66 MMHG | HEART RATE: 68 BPM | HEIGHT: 62 IN | OXYGEN SATURATION: 99 %

## 2021-10-20 DIAGNOSIS — E21.1 HYPERPARATHYROIDISM DUE TO VITAMIN D DEFICIENCY (HCC): ICD-10-CM

## 2021-10-20 DIAGNOSIS — R80.9 MICROALBUMINURIA: ICD-10-CM

## 2021-10-20 DIAGNOSIS — I10 ESSENTIAL HYPERTENSION: ICD-10-CM

## 2021-10-20 DIAGNOSIS — N18.31 STAGE 3A CHRONIC KIDNEY DISEASE: ICD-10-CM

## 2021-10-20 DIAGNOSIS — E55.9 VITAMIN D DEFICIENCY: ICD-10-CM

## 2021-10-20 DIAGNOSIS — M10.9 GOUT OF BOTH FEET: ICD-10-CM

## 2021-10-20 DIAGNOSIS — D64.9 ANEMIA, UNSPECIFIED TYPE: ICD-10-CM

## 2021-10-20 PROCEDURE — 99214 OFFICE O/P EST MOD 30 MIN: CPT | Performed by: INTERNAL MEDICINE

## 2021-10-20 ASSESSMENT — ENCOUNTER SYMPTOMS
NAUSEA: 0
ABDOMINAL PAIN: 0
FEVER: 0
SINUS PAIN: 0
HEMOPTYSIS: 0
VOMITING: 0
DIARRHEA: 0
CHILLS: 0
SHORTNESS OF BREATH: 0
HYPERTENSION: 1
FLANK PAIN: 0
PALPITATIONS: 0
EYES NEGATIVE: 1
WEIGHT LOSS: 0
COUGH: 0
ORTHOPNEA: 0
WHEEZING: 0

## 2021-10-20 ASSESSMENT — FIBROSIS 4 INDEX: FIB4 SCORE: 1.36

## 2021-10-20 NOTE — PROGRESS NOTES
Subjective:      Danielle Jon is a 80 y.o. female who presents with Follow-Up and Chronic Kidney Disease            Chronic Kidney Disease  Pertinent negatives include no abdominal pain, chest pain, chills, congestion, coughing, fever, nausea or vomiting.   Hypertension  Pertinent negatives include no chest pain, malaise/fatigue, orthopnea, palpitations or shortness of breath.     Danielle is coming today for HTN, CKD III f/u  Doing well, no complaints  No difficulties to urinate  No dysuria/hematuria/flank pain  HTN: elevated BP improved -very well controlled now  BP improved -well controlled now  CKD IIIa - creat level improved  -down to 0.96  vit D level well controlled  Elevated PTH  -scheduled parathyroid  nuclear test -negative for parathyroid adenoma.    Review of Systems   Constitutional: Negative for chills, fever, malaise/fatigue and weight loss.   HENT: Negative for congestion, hearing loss and sinus pain.    Eyes: Negative.    Respiratory: Negative for cough, hemoptysis, shortness of breath and wheezing.    Cardiovascular: Negative for chest pain, palpitations, orthopnea and leg swelling.   Gastrointestinal: Negative for abdominal pain, diarrhea, nausea and vomiting.   Genitourinary: Negative for dysuria, flank pain, frequency, hematuria and urgency.   Skin: Negative.    All other systems reviewed and are negative.    Past Medical History:   Diagnosis Date   • Cataract    • CKD (chronic kidney disease), stage III (HCC) 11/29/2017   • Essential hypertension 2/19/2016   • Gout of both feet 6/6/2017   • Hypercalcemia 12/14/2017   • Hypertriglyceridemia 4/4/2017   • Hypothyroidism 2/19/2016   • Irregular heart beat 10/22/2019   • Stroke (HCC)     TIA at age 33       Family History   Problem Relation Age of Onset   • Hypertension Mother    • Heart Disease Mother         chf   • Lung Disease Father    • Hypertension Sister    • Hyperlipidemia Sister    • Hypertension Brother        Social History  "    Socioeconomic History   • Marital status:      Spouse name: Not on file   • Number of children: Not on file   • Years of education: Not on file   • Highest education level: Not on file   Occupational History   • Not on file   Tobacco Use   • Smoking status: Never Smoker   • Smokeless tobacco: Never Used   Vaping Use   • Vaping Use: Never used   Substance and Sexual Activity   • Alcohol use: Yes     Alcohol/week: 8.4 oz     Types: 14 Glasses of wine per week     Comment: 2 glasses of wine daily   • Drug use: No   • Sexual activity: Yes     Partners: Male   Other Topics Concern   • Not on file   Social History Narrative    Retired from [email or web address].  Lives with her .  Having some hearing concerns.  Walking without walking assistance, mentally and physically.  Independent. No social or domestic concerns.     Social Determinants of Health     Financial Resource Strain:    • Difficulty of Paying Living Expenses:    Food Insecurity:    • Worried About Running Out of Food in the Last Year:    • Ran Out of Food in the Last Year:    Transportation Needs:    • Lack of Transportation (Medical):    • Lack of Transportation (Non-Medical):    Physical Activity:    • Days of Exercise per Week:    • Minutes of Exercise per Session:    Stress:    • Feeling of Stress :    Social Connections:    • Frequency of Communication with Friends and Family:    • Frequency of Social Gatherings with Friends and Family:    • Attends Confucianist Services:    • Active Member of Clubs or Organizations:    • Attends Club or Organization Meetings:    • Marital Status:    Intimate Partner Violence:    • Fear of Current or Ex-Partner:    • Emotionally Abused:    • Physically Abused:    • Sexually Abused:           Objective:     /66 (BP Location: Right arm, Patient Position: Sitting, BP Cuff Size: Adult)   Pulse 68   Temp 36.7 °C (98.1 °F) (Temporal)   Ht 1.562 m (5' 1.5\")   Wt 54 kg (119 lb)   SpO2 99%   BMI 22.12 kg/m²  "     Physical Exam  Vitals reviewed.   Constitutional:       General: She is not in acute distress.     Appearance: Normal appearance. She is well-developed. She is not diaphoretic.   HENT:      Head: Normocephalic and atraumatic.      Nose: Nose normal.      Mouth/Throat:      Mouth: Mucous membranes are moist.      Pharynx: Oropharynx is clear.   Eyes:      General: No scleral icterus.     Extraocular Movements: Extraocular movements intact.      Conjunctiva/sclera: Conjunctivae normal.      Pupils: Pupils are equal, round, and reactive to light.   Cardiovascular:      Rate and Rhythm: Normal rate and regular rhythm.      Pulses: Normal pulses.      Heart sounds: Normal heart sounds. No gallop.    Pulmonary:      Effort: Pulmonary effort is normal. No respiratory distress.      Breath sounds: Normal breath sounds. No wheezing, rhonchi or rales.   Abdominal:      General: Bowel sounds are normal. There is no distension.      Palpations: Abdomen is soft. There is no mass.      Tenderness: There is no abdominal tenderness. There is no right CVA tenderness, left CVA tenderness or guarding.   Musculoskeletal:      Cervical back: Normal range of motion and neck supple.      Right lower leg: No edema.      Left lower leg: No edema.   Skin:     General: Skin is warm.      Coloration: Skin is not pale.      Findings: No erythema or rash.   Neurological:      General: No focal deficit present.      Mental Status: She is alert and oriented to person, place, and time.      Cranial Nerves: No cranial nerve deficit.      Coordination: Coordination normal.   Psychiatric:         Mood and Affect: Mood normal.         Behavior: Behavior normal.         Thought Content: Thought content normal.         Judgment: Judgment normal.               Laboratory results reviewed: d/w Pt  Lab Results   Component Value Date/Time    CREATININE 0.96 10/15/2021 01:05 PM    POTASSIUM 3.8 10/15/2021 01:05 PM       Assessment/Plan:     1. CKD  (chronic kidney disease), stage IIIa      creat level improved to 0.96 -to monitor      2. Essential hypertension      BP well controlled - continue current treatment    3. Vit D def      Low vit D level - improved -continue supplementation      elevated PTH - borderline high      Nuclear scan negative for parathyroid adenoma.    4. Anemia : Hb level WNL    5. Gout; uric acid well controlled with allopurinol      To monitor    6. Microalbuminuria mild -controlled with ARB      Recs: Continue current medications, low Na diet, monitor BP               F/u in 6 months

## 2021-10-25 DIAGNOSIS — E78.5 DYSLIPIDEMIA: ICD-10-CM

## 2021-10-25 RX ORDER — ATORVASTATIN CALCIUM 20 MG/1
TABLET, FILM COATED ORAL
Qty: 90 TABLET | Refills: 3 | Status: SHIPPED | OUTPATIENT
Start: 2021-10-25 | End: 2022-11-01

## 2021-10-26 ENCOUNTER — OFFICE VISIT (OUTPATIENT)
Dept: MEDICAL GROUP | Facility: PHYSICIAN GROUP | Age: 81
End: 2021-10-26
Payer: MEDICARE

## 2021-10-26 VITALS
RESPIRATION RATE: 16 BRPM | BODY MASS INDEX: 22.28 KG/M2 | OXYGEN SATURATION: 98 % | SYSTOLIC BLOOD PRESSURE: 126 MMHG | WEIGHT: 118 LBS | HEIGHT: 61 IN | TEMPERATURE: 98 F | DIASTOLIC BLOOD PRESSURE: 62 MMHG | HEART RATE: 75 BPM

## 2021-10-26 DIAGNOSIS — G45.9 TIA (TRANSIENT ISCHEMIC ATTACK): Chronic | ICD-10-CM

## 2021-10-26 DIAGNOSIS — E03.9 ACQUIRED HYPOTHYROIDISM: ICD-10-CM

## 2021-10-26 DIAGNOSIS — E78.5 DYSLIPIDEMIA: ICD-10-CM

## 2021-10-26 DIAGNOSIS — I10 ESSENTIAL HYPERTENSION: ICD-10-CM

## 2021-10-26 DIAGNOSIS — M10.9 GOUT OF BOTH FEET: Chronic | ICD-10-CM

## 2021-10-26 PROCEDURE — 99214 OFFICE O/P EST MOD 30 MIN: CPT | Performed by: INTERNAL MEDICINE

## 2021-10-26 ASSESSMENT — FIBROSIS 4 INDEX: FIB4 SCORE: 1.36

## 2021-10-26 NOTE — ASSESSMENT & PLAN NOTE
This is a chronic condition.  She is currently taking levothyroxine.  Lab tests ordered for follow-up.

## 2021-10-26 NOTE — PROGRESS NOTES
CC: Follow-up hypertension  Lab test review      HPI: This is a 81 y.o. pt.  Pt's medical history is notable for:     TIA (transient ischemic attack)  Patient reported history of TIA at age 32.  Currently she is taking aspirin daily.  The patient denies any new neurologic symptoms.  Patient denies motor weakness paresthesia change in vision slurred speech or headaches.    Hypothyroidism  This is a chronic condition.  She is currently taking levothyroxine.  Lab tests ordered for follow-up.    Gout of both feet  Chronic stable condition.  The patient is being treated with allopurinol.  No recent gout flareup.    Essential hypertension  Chronic stable condition.  Patient is presently taking chlorthalidone and losartan.  Her blood pressure has been well controlled.  No side effects reported.          REVIEW OF SYSTEMS:     Constitutional:  no fever / chills   Eyes: no changes in vision  ENT: no sore throat, no hearing loss  CV:  no chest pain, no palpitations  Pulmonary: no SOB, no cough          Allergies: Niacin    Current Outpatient Medications Ordered in Epic   Medication Sig Dispense Refill   • atorvastatin (LIPITOR) 20 MG Tab TAKE ONE TABLET BY MOUTH AT BEDTIME 90 Tablet 3   • potassium chloride SA (KDUR) 20 MEQ Tab CR TAKE ONE TABLET BY MOUTH TWICE A  tablet 1   • losartan (COZAAR) 100 MG Tab TAKE ONE TABLET BY MOUTH EVERY DAY 90 tablet 1   • allopurinol (ZYLOPRIM) 100 MG Tab TAKE ONE TABLET BY MOUTH EVERY DAY 90 tablet 1   • chlorthalidone (HYGROTON) 50 MG Tab TAKE ONE TABLET BY MOUTH EVERY DAY ( STOP AMLODIPINE AND HCTZ) 90 tablet 2   • levothyroxine (SYNTHROID) 50 MCG Tab TAKE ONE TABLET BY MOUTH EVERY MORNING ON AN EMPTY STOMACH 90 Tab 3   • magnesium oxide (MAG-OX) 400 MG Tab Take 400 mg by mouth every day.     • carbamide peroxide (CARBAMOXIDE EAR DROPS) 6.5 % Solution Place 6 Drops in ear 2 times a day. Administer drops in both ears. 1 Bottle 1   • Acetaminophen (TYLENOL 8 HOUR PO) Take  by mouth.    "  • Cetirizine HCl 10 MG Cap Take  by mouth.     • cyanocobalamin (VITAMIN B-12) 500 MCG Tab Take 500 mcg by mouth every day.     • aspirin 81 MG tablet Take 81 mg by mouth every day.       No current [facility]-ordered facility-administered medications on file.       Past Medical, Social, and Family history reviewed and updated in EPIC     ------------------------------------------------------------------------------     PHYSICAL EXAM:   Vitals:    10/26/21 0947   BP: 126/62   Pulse: 75   Resp: 16   Temp: 36.7 °C (98 °F)   SpO2: 98%        Vitals:    10/26/21 0947   BP: 126/62   Weight: 53.5 kg (118 lb)   Height: 1.549 m (5' 1\")         Body mass index is 22.3 kg/m².    Constitutional: no acute distress  CV: heart RRR  Resp: normal effort, no wheezing or rales.  GI: abdomen soft, no obvious mass, no tenderness  Neuro: CN 2-12 grossly intact    Results for BILL WINTERS (MRN [id number]) as of 10/26/2021 09:52   Ref. Range 10/15/2021 13:05   Sodium Latest Ref Range: 135 - 145 mmol/L 137   Potassium Latest Ref Range: 3.6 - 5.5 mmol/L 3.8   Chloride Latest Ref Range: 96 - 112 mmol/L 99   Co2 Latest Ref Range: 20 - 33 mmol/L 28   Anion Gap Latest Ref Range: 7.0 - 16.0  10.0   Glucose Latest Ref Range: 65 - 99 mg/dL 91   Bun Latest Ref Range: 8 - 22 mg/dL 15   Creatinine Latest Ref Range: 0.50 - 1.40 mg/dL 0.96   GFR If  Latest Ref Range: >60 mL/min/1.73 m 2 >60   GFR If Non  Latest Ref Range: >60 mL/min/1.73 m 2 56 (A)   Calcium Latest Ref Range: 8.5 - 10.5 mg/dL 10.2   Uric Acid Latest Ref Range: 1.9 - 8.2 mg/dL 3.9   Fasting Status Unknown Non-Fasting       -----------------------------------------------------------------------------    ASSESSMENT:   1. Dyslipidemia  ALANINE AMINO-TRANS    Lipid Profile   2. Acquired hypothyroidism  TSH   3. Essential hypertension  Basic Metabolic Panel   4. TIA (transient ischemic attack)     5. Gout of both feet             MEDICAL DECISION MAKING: " DISCUSSION / STATUS / PLAN:    Overall patient is medically stable.    Dyslipidemia.  Lipid panel ordered.  Continue with statin.    Hypothyroidism.  TSH ordered.  Continue with levothyroxine.    Essential hypertension.  BP stable.  Continue current management.    History of TIA.  Continue aspirin.    Gout.  Asymptomatic.  Continue allopurinol.    Follow-up 6 months.  Lab tests ordered.         -If any problems should arise, patient was advised to contact our office for followup or go to ER to be evaluated.    Please note that this dictation was created using voice recognition software. I have made every reasonable attempt to correct obvious errors, but it is possible there are errors of grammar and possibly content that I did not discover before finalizing the note.

## 2021-10-26 NOTE — ASSESSMENT & PLAN NOTE
Patient reported history of TIA at age 32.  Currently she is taking aspirin daily.  The patient denies any new neurologic symptoms.  Patient denies motor weakness paresthesia change in vision slurred speech or headaches.

## 2021-10-26 NOTE — ASSESSMENT & PLAN NOTE
Chronic stable condition.  The patient is being treated with allopurinol.  No recent gout flareup.

## 2021-10-26 NOTE — ASSESSMENT & PLAN NOTE
Chronic stable condition.  Patient is presently taking chlorthalidone and losartan.  Her blood pressure has been well controlled.  No side effects reported.

## 2021-11-23 DIAGNOSIS — M10.9 GOUT OF BOTH FEET: ICD-10-CM

## 2021-11-23 RX ORDER — ALLOPURINOL 100 MG/1
TABLET ORAL
Qty: 90 TABLET | Refills: 1 | Status: SHIPPED | OUTPATIENT
Start: 2021-11-23 | End: 2022-05-13

## 2021-12-09 DIAGNOSIS — E03.9 ACQUIRED HYPOTHYROIDISM: ICD-10-CM

## 2021-12-09 DIAGNOSIS — E03.9 HYPOTHYROIDISM, UNSPECIFIED TYPE: ICD-10-CM

## 2021-12-09 RX ORDER — LEVOTHYROXINE SODIUM 0.05 MG/1
50 TABLET ORAL
Qty: 90 TABLET | Refills: 0 | Status: SHIPPED | OUTPATIENT
Start: 2021-12-09 | End: 2022-02-28 | Stop reason: SDUPTHER

## 2022-01-20 DIAGNOSIS — I10 ESSENTIAL HYPERTENSION: ICD-10-CM

## 2022-01-20 RX ORDER — LOSARTAN POTASSIUM 100 MG/1
TABLET ORAL
Qty: 90 TABLET | Refills: 3 | Status: SHIPPED | OUTPATIENT
Start: 2022-01-20 | End: 2023-04-17

## 2022-01-27 ENCOUNTER — TELEPHONE (OUTPATIENT)
Dept: NEPHROLOGY | Facility: MEDICAL CENTER | Age: 82
End: 2022-01-27

## 2022-01-27 DIAGNOSIS — I10 ESSENTIAL HYPERTENSION: ICD-10-CM

## 2022-01-27 DIAGNOSIS — M10.9 GOUT OF BOTH FEET: ICD-10-CM

## 2022-01-27 DIAGNOSIS — D64.9 ANEMIA, UNSPECIFIED TYPE: ICD-10-CM

## 2022-01-27 DIAGNOSIS — N18.31 STAGE 3A CHRONIC KIDNEY DISEASE: ICD-10-CM

## 2022-01-27 DIAGNOSIS — E21.1 HYPERPARATHYROIDISM DUE TO VITAMIN D DEFICIENCY (HCC): ICD-10-CM

## 2022-01-27 DIAGNOSIS — R80.9 MICROALBUMINURIA: ICD-10-CM

## 2022-01-27 NOTE — TELEPHONE ENCOUNTER
Maura Hewitt,  Patient is scheduled to see you next Thursday, however, lab orders have an expected date after her appt.   Could you please re order labs that she can complete?    Thank you !

## 2022-01-28 ENCOUNTER — HOSPITAL ENCOUNTER (OUTPATIENT)
Dept: LAB | Facility: MEDICAL CENTER | Age: 82
End: 2022-01-28
Attending: INTERNAL MEDICINE
Payer: MEDICARE

## 2022-01-28 DIAGNOSIS — E21.1 HYPERPARATHYROIDISM DUE TO VITAMIN D DEFICIENCY (HCC): ICD-10-CM

## 2022-01-28 DIAGNOSIS — N18.31 STAGE 3A CHRONIC KIDNEY DISEASE: ICD-10-CM

## 2022-01-28 DIAGNOSIS — D64.9 ANEMIA, UNSPECIFIED TYPE: ICD-10-CM

## 2022-01-28 DIAGNOSIS — R80.9 MICROALBUMINURIA: ICD-10-CM

## 2022-01-28 LAB
25(OH)D3 SERPL-MCNC: 67 NG/ML (ref 30–100)
ANION GAP SERPL CALC-SCNC: 11 MMOL/L (ref 7–16)
BUN SERPL-MCNC: 14 MG/DL (ref 8–22)
CALCIUM SERPL-MCNC: 10.1 MG/DL (ref 8.5–10.5)
CHLORIDE SERPL-SCNC: 100 MMOL/L (ref 96–112)
CO2 SERPL-SCNC: 27 MMOL/L (ref 20–33)
CREAT SERPL-MCNC: 0.99 MG/DL (ref 0.5–1.4)
ERYTHROCYTE [DISTWIDTH] IN BLOOD BY AUTOMATED COUNT: 46.2 FL (ref 35.9–50)
GLUCOSE SERPL-MCNC: 155 MG/DL (ref 65–99)
HCT VFR BLD AUTO: 41.3 % (ref 37–47)
HGB BLD-MCNC: 14.1 G/DL (ref 12–16)
MCH RBC QN AUTO: 33.9 PG (ref 27–33)
MCHC RBC AUTO-ENTMCNC: 34.1 G/DL (ref 33.6–35)
MCV RBC AUTO: 99.3 FL (ref 81.4–97.8)
PLATELET # BLD AUTO: 279 K/UL (ref 164–446)
PMV BLD AUTO: 10.2 FL (ref 9–12.9)
POTASSIUM SERPL-SCNC: 3.8 MMOL/L (ref 3.6–5.5)
PTH-INTACT SERPL-MCNC: 103 PG/ML (ref 14–72)
RBC # BLD AUTO: 4.16 M/UL (ref 4.2–5.4)
SODIUM SERPL-SCNC: 138 MMOL/L (ref 135–145)
URATE SERPL-MCNC: 3.6 MG/DL (ref 1.9–8.2)
WBC # BLD AUTO: 6.1 K/UL (ref 4.8–10.8)

## 2022-01-28 PROCEDURE — 83970 ASSAY OF PARATHORMONE: CPT

## 2022-01-28 PROCEDURE — 36415 COLL VENOUS BLD VENIPUNCTURE: CPT

## 2022-01-28 PROCEDURE — 84550 ASSAY OF BLOOD/URIC ACID: CPT

## 2022-01-28 PROCEDURE — 82306 VITAMIN D 25 HYDROXY: CPT

## 2022-01-28 PROCEDURE — 85027 COMPLETE CBC AUTOMATED: CPT

## 2022-01-28 PROCEDURE — 80048 BASIC METABOLIC PNL TOTAL CA: CPT

## 2022-01-31 ENCOUNTER — HOSPITAL ENCOUNTER (OUTPATIENT)
Facility: MEDICAL CENTER | Age: 82
End: 2022-01-31
Attending: INTERNAL MEDICINE
Payer: MEDICARE

## 2022-01-31 LAB
CREAT UR-MCNC: 57.68 MG/DL
MICROALBUMIN UR-MCNC: 2.6 MG/DL
MICROALBUMIN/CREAT UR: 45 MG/G (ref 0–30)

## 2022-01-31 PROCEDURE — 82043 UR ALBUMIN QUANTITATIVE: CPT

## 2022-01-31 PROCEDURE — 82570 ASSAY OF URINE CREATININE: CPT

## 2022-02-02 DIAGNOSIS — I10 ESSENTIAL HYPERTENSION: Chronic | ICD-10-CM

## 2022-02-03 ENCOUNTER — OFFICE VISIT (OUTPATIENT)
Dept: NEPHROLOGY | Facility: MEDICAL CENTER | Age: 82
End: 2022-02-03
Payer: MEDICARE

## 2022-02-03 VITALS
TEMPERATURE: 97.9 F | OXYGEN SATURATION: 99 % | HEIGHT: 61 IN | WEIGHT: 110 LBS | HEART RATE: 75 BPM | BODY MASS INDEX: 20.77 KG/M2 | SYSTOLIC BLOOD PRESSURE: 116 MMHG | DIASTOLIC BLOOD PRESSURE: 70 MMHG

## 2022-02-03 DIAGNOSIS — E21.1 HYPERPARATHYROIDISM DUE TO VITAMIN D DEFICIENCY (HCC): ICD-10-CM

## 2022-02-03 DIAGNOSIS — E55.9 VITAMIN D DEFICIENCY: ICD-10-CM

## 2022-02-03 DIAGNOSIS — I10 ESSENTIAL HYPERTENSION: ICD-10-CM

## 2022-02-03 DIAGNOSIS — M10.9 GOUT OF BOTH FEET: ICD-10-CM

## 2022-02-03 DIAGNOSIS — N18.31 STAGE 3A CHRONIC KIDNEY DISEASE: ICD-10-CM

## 2022-02-03 DIAGNOSIS — R80.9 MICROALBUMINURIA: ICD-10-CM

## 2022-02-03 DIAGNOSIS — D64.9 ANEMIA, UNSPECIFIED TYPE: ICD-10-CM

## 2022-02-03 PROCEDURE — 99214 OFFICE O/P EST MOD 30 MIN: CPT | Performed by: INTERNAL MEDICINE

## 2022-02-03 RX ORDER — CHLORTHALIDONE 50 MG/1
TABLET ORAL
Qty: 90 TABLET | Refills: 0 | Status: SHIPPED | OUTPATIENT
Start: 2022-02-03 | End: 2022-05-09

## 2022-02-03 ASSESSMENT — ENCOUNTER SYMPTOMS
ORTHOPNEA: 0
CHILLS: 0
WEIGHT LOSS: 0
FEVER: 0
SINUS PAIN: 0
NAUSEA: 0
ABDOMINAL PAIN: 0
VOMITING: 0
SHORTNESS OF BREATH: 0
WHEEZING: 0
HYPERTENSION: 1
EYES NEGATIVE: 1
FLANK PAIN: 0
COUGH: 0
PALPITATIONS: 0
HEMOPTYSIS: 0

## 2022-02-03 NOTE — PROGRESS NOTES
Subjective:      Danielle Jon is a 81 y.o. female who presents with Follow-Up and Chronic Kidney Disease            Chronic Kidney Disease  Pertinent negatives include no abdominal pain, chest pain, chills, congestion, coughing, fever, nausea or vomiting.   Hypertension  Pertinent negatives include no chest pain, malaise/fatigue, orthopnea, palpitations or shortness of breath.     Danielle is coming today for HTN, CKD III f/u  Doing well, no complaints  No difficulties to urinate  No dysuria/hematuria/flank pain  HTN: elevated BP improved -very well controlled now  BP improved -well controlled now  CKD IIIa - creat level stable at 0.9's  vit D level well controlled  Elevated PTH  - worse -parathyroid  nuclear test -negative for parathyroid adenoma.    Review of Systems   Constitutional: Negative for chills, fever, malaise/fatigue and weight loss.   HENT: Negative for congestion, hearing loss and sinus pain.    Eyes: Negative.    Respiratory: Negative for cough, hemoptysis, shortness of breath and wheezing.    Cardiovascular: Negative for chest pain, palpitations, orthopnea and leg swelling.   Gastrointestinal: Negative for abdominal pain, nausea and vomiting.   Genitourinary: Negative for dysuria, flank pain, frequency, hematuria and urgency.   Skin: Negative.    All other systems reviewed and are negative.    Past Medical History:   Diagnosis Date   • Cataract    • CKD (chronic kidney disease), stage III (HCC) 11/29/2017   • Essential hypertension 2/19/2016   • Gout of both feet 6/6/2017   • Hypercalcemia 12/14/2017   • Hypertriglyceridemia 4/4/2017   • Hypothyroidism 2/19/2016   • Irregular heart beat 10/22/2019   • Stroke (HCC)     TIA at age 33       Family History   Problem Relation Age of Onset   • Hypertension Mother    • Heart Disease Mother         chf   • Lung Disease Father    • Hypertension Sister    • Hyperlipidemia Sister    • Hypertension Brother        Social History     Socioeconomic History   •  Marital status:      Spouse name: Not on file   • Number of children: Not on file   • Years of education: Not on file   • Highest education level: Not on file   Occupational History   • Not on file   Tobacco Use   • Smoking status: Never Smoker   • Smokeless tobacco: Never Used   Vaping Use   • Vaping Use: Never used   Substance and Sexual Activity   • Alcohol use: Yes     Alcohol/week: 8.4 oz     Types: 14 Glasses of wine per week     Comment: 2 glasses of wine daily   • Drug use: No   • Sexual activity: Yes     Partners: Male   Other Topics Concern   • Not on file   Social History Narrative    Retired from [employer].  Lives with her .  Having some hearing concerns.  Walking without walking assistance, mentally and physically.  Independent. No social or domestic concerns.     Social Determinants of Health     Financial Resource Strain:    • Difficulty of Paying Living Expenses: Not on file   Food Insecurity:    • Worried About Running Out of Food in the Last Year: Not on file   • Ran Out of Food in the Last Year: Not on file   Transportation Needs:    • Lack of Transportation (Medical): Not on file   • Lack of Transportation (Non-Medical): Not on file   Physical Activity:    • Days of Exercise per Week: Not on file   • Minutes of Exercise per Session: Not on file   Stress:    • Feeling of Stress : Not on file   Social Connections:    • Frequency of Communication with Friends and Family: Not on file   • Frequency of Social Gatherings with Friends and Family: Not on file   • Attends Evangelical Services: Not on file   • Active Member of Clubs or Organizations: Not on file   • Attends Club or Organization Meetings: Not on file   • Marital Status: Not on file   Intimate Partner Violence:    • Fear of Current or Ex-Partner: Not on file   • Emotionally Abused: Not on file   • Physically Abused: Not on file   • Sexually Abused: Not on file   Housing Stability:    • Unable to Pay for Housing in the Last Year:  "Not on file   • Number of Places Lived in the Last Year: Not on file   • Unstable Housing in the Last Year: Not on file          Objective:     /70 (BP Location: Left arm, Patient Position: Sitting, BP Cuff Size: Adult)   Pulse 75   Temp 36.6 °C (97.9 °F) (Temporal)   Ht 1.549 m (5' 1\")   Wt 49.9 kg (110 lb)   SpO2 99%   BMI 20.78 kg/m²      Physical Exam  Vitals reviewed.   Constitutional:       General: She is not in acute distress.     Appearance: Normal appearance. She is well-developed. She is not diaphoretic.   HENT:      Head: Normocephalic and atraumatic.      Nose: Nose normal.      Mouth/Throat:      Mouth: Mucous membranes are moist.      Pharynx: Oropharynx is clear.   Eyes:      Extraocular Movements: Extraocular movements intact.      Conjunctiva/sclera: Conjunctivae normal.      Pupils: Pupils are equal, round, and reactive to light.   Cardiovascular:      Rate and Rhythm: Normal rate and regular rhythm.      Pulses: Normal pulses.      Heart sounds: Normal heart sounds. No friction rub. No gallop.    Pulmonary:      Effort: Pulmonary effort is normal. No respiratory distress.      Breath sounds: Normal breath sounds. No wheezing, rhonchi or rales.   Abdominal:      General: Bowel sounds are normal. There is no distension.      Palpations: Abdomen is soft. There is no mass.      Tenderness: There is no abdominal tenderness. There is no right CVA tenderness or left CVA tenderness.   Musculoskeletal:      Cervical back: Normal range of motion and neck supple.      Right lower leg: No edema.      Left lower leg: No edema.   Skin:     General: Skin is warm.      Coloration: Skin is not pale.      Findings: No erythema or rash.   Neurological:      General: No focal deficit present.      Mental Status: She is alert and oriented to person, place, and time.      Cranial Nerves: No cranial nerve deficit.      Coordination: Coordination normal.   Psychiatric:         Mood and Affect: Mood normal.   "       Behavior: Behavior normal.         Thought Content: Thought content normal.         Judgment: Judgment normal.               Laboratory results reviewed: d/w Pt  Lab Results   Component Value Date/Time    CREATININE 0.99 01/28/2022 03:51 PM    POTASSIUM 3.8 01/28/2022 03:51 PM       Assessment/Plan:     1. CKD (chronic kidney disease), stage IIIa      creat level stable at baseline -to monitor      2. Essential hypertension      BP well controlled - continue current treatment    3. Vit D def      vit D level - well controlled-continue supplementation      elevated PTH -worse      Nuclear scan negative for parathyroid adenoma.    4. Anemia : Hb level WNL    5. Gout; uric acid well controlled with allopurinol      To monitor    6. Microalbuminuria mild -controlled with ARB      Recs: Continue current medications, low Na diet, monitor BP             Keep well hydrated             F/u in 4-5 months

## 2022-02-11 ENCOUNTER — OFFICE VISIT (OUTPATIENT)
Dept: CARDIOLOGY | Facility: MEDICAL CENTER | Age: 82
End: 2022-02-11
Payer: MEDICARE

## 2022-02-11 VITALS
BODY MASS INDEX: 21.71 KG/M2 | DIASTOLIC BLOOD PRESSURE: 60 MMHG | RESPIRATION RATE: 14 BRPM | SYSTOLIC BLOOD PRESSURE: 124 MMHG | HEART RATE: 74 BPM | OXYGEN SATURATION: 98 % | WEIGHT: 115 LBS | HEIGHT: 61 IN

## 2022-02-11 DIAGNOSIS — E78.5 DYSLIPIDEMIA: Chronic | ICD-10-CM

## 2022-02-11 DIAGNOSIS — G45.9 TIA (TRANSIENT ISCHEMIC ATTACK): Chronic | ICD-10-CM

## 2022-02-11 DIAGNOSIS — I10 ESSENTIAL HYPERTENSION: Chronic | ICD-10-CM

## 2022-02-11 PROCEDURE — 99214 OFFICE O/P EST MOD 30 MIN: CPT | Performed by: INTERNAL MEDICINE

## 2022-02-11 ASSESSMENT — ENCOUNTER SYMPTOMS
BLURRED VISION: 0
HEARTBURN: 0
IRREGULAR HEARTBEAT: 0
DIARRHEA: 0
NAUSEA: 0
CLAUDICATION: 0
WEIGHT LOSS: 0
NEAR-SYNCOPE: 0
ABDOMINAL PAIN: 0
FLANK PAIN: 0
DEPRESSION: 0
WEIGHT GAIN: 0
SHORTNESS OF BREATH: 0
COUGH: 0
BACK PAIN: 0
ORTHOPNEA: 0
ALTERED MENTAL STATUS: 0
VOMITING: 0
SYNCOPE: 0
DIZZINESS: 0
FEVER: 0
PND: 0
CONSTIPATION: 0
DYSPNEA ON EXERTION: 0
DECREASED APPETITE: 0
PALPITATIONS: 0

## 2022-02-11 NOTE — PROGRESS NOTES
Cardiology Note    Chief Complaint   Patient presents with   • Hypertension     F/V Dx: Essential hypertension   • Dyslipidemia   • Transient Ischemic Attack       History of Present Illness: Danielle Jon is a 81 y.o. female PMH HTN, HLD, hx TIA, hypothyroid, CKD who presents for follow up.    Overall feels well. No cardiac complaints. Compliant with medication and denies adverse effects. Home blood pressure stable 110-120/60s. She is following PCP and nephrology for CKD with mild microalbuminuria.     Review of Systems   Constitutional: Negative for decreased appetite, fever, malaise/fatigue, weight gain and weight loss.   HENT: Negative for congestion and nosebleeds.    Eyes: Negative for blurred vision.   Cardiovascular: Negative for chest pain, claudication, dyspnea on exertion, irregular heartbeat, leg swelling, near-syncope, orthopnea, palpitations, paroxysmal nocturnal dyspnea and syncope.   Respiratory: Negative for cough and shortness of breath.    Endocrine: Negative for cold intolerance and heat intolerance.   Skin: Negative for rash.   Musculoskeletal: Negative for back pain.   Gastrointestinal: Negative for abdominal pain, constipation, diarrhea, heartburn, melena, nausea and vomiting.   Genitourinary: Negative for dysuria, flank pain and hematuria.   Neurological: Negative for dizziness.   Psychiatric/Behavioral: Negative for altered mental status and depression.         Past Medical History:   Diagnosis Date   • Cataract    • CKD (chronic kidney disease), stage III (HCC) 11/29/2017   • Essential hypertension 2/19/2016   • Gout of both feet 6/6/2017   • Hypercalcemia 12/14/2017   • Hypertriglyceridemia 4/4/2017   • Hypothyroidism 2/19/2016   • Irregular heart beat 10/22/2019   • Stroke (HCC)     TIA at age 33         Past Surgical History:   Procedure Laterality Date   • CATARACT EXTRACTION WITH IOL Bilateral 2014   • EYE SURGERY      lasix    • MAMMOPLASTY REDUCTION           Current Outpatient  Medications   Medication Sig Dispense Refill   • chlorthalidone (HYGROTON) 50 MG Tab TAKE ONE TABLET BY MOUTH EVERY DAY; STOP AMLODIPINE AND HCTZ 90 Tablet 0   • losartan (COZAAR) 100 MG Tab TAKE ONE TABLET BY MOUTH EVERY DAY 90 Tablet 3   • levothyroxine (SYNTHROID) 50 MCG Tab Take 1 Tablet by mouth every morning on an empty stomach. 90 Tablet 0   • allopurinol (ZYLOPRIM) 100 MG Tab TAKE ONE TABLET BY MOUTH EVERY DAY 90 Tablet 1   • atorvastatin (LIPITOR) 20 MG Tab TAKE ONE TABLET BY MOUTH AT BEDTIME 90 Tablet 3   • potassium chloride SA (KDUR) 20 MEQ Tab CR TAKE ONE TABLET BY MOUTH TWICE A  tablet 1   • magnesium oxide (MAG-OX) 400 MG Tab Take 400 mg by mouth every day.     • carbamide peroxide (CARBAMOXIDE EAR DROPS) 6.5 % Solution Place 6 Drops in ear 2 times a day. Administer drops in both ears. 1 Bottle 1   • Acetaminophen (TYLENOL 8 HOUR PO) Take  by mouth.     • Cetirizine HCl 10 MG Cap Take  by mouth. Patient requesting refill     • cyanocobalamin (VITAMIN B-12) 500 MCG Tab Take 500 mcg by mouth every day.     • aspirin 81 MG tablet Take 81 mg by mouth every day.       No current facility-administered medications for this visit.         Allergies   Allergen Reactions   • Niacin Rash and Swelling     Rash/itching/swelling  Felt electric shocks, burning, itching         Family History   Problem Relation Age of Onset   • Hypertension Mother    • Heart Disease Mother         chf   • Lung Disease Father    • Hypertension Sister    • Hyperlipidemia Sister    • Hypertension Brother          Social History     Socioeconomic History   • Marital status:      Spouse name: Not on file   • Number of children: Not on file   • Years of education: Not on file   • Highest education level: Not on file   Occupational History   • Not on file   Tobacco Use   • Smoking status: Never Smoker   • Smokeless tobacco: Never Used   Vaping Use   • Vaping Use: Never used   Substance and Sexual Activity   • Alcohol use: Yes  "    Alcohol/week: 8.4 oz     Types: 14 Glasses of wine per week     Comment: 2 glasses of wine daily   • Drug use: No   • Sexual activity: Yes     Partners: Male   Other Topics Concern   • Not on file   Social History Narrative    Retired from [employer].  Lives with her .  Having some hearing concerns.  Walking without walking assistance, mentally and physically.  Independent. No social or domestic concerns.     Social Determinants of Health     Financial Resource Strain:    • Difficulty of Paying Living Expenses: Not on file   Food Insecurity:    • Worried About Running Out of Food in the Last Year: Not on file   • Ran Out of Food in the Last Year: Not on file   Transportation Needs:    • Lack of Transportation (Medical): Not on file   • Lack of Transportation (Non-Medical): Not on file   Physical Activity:    • Days of Exercise per Week: Not on file   • Minutes of Exercise per Session: Not on file   Stress:    • Feeling of Stress : Not on file   Social Connections:    • Frequency of Communication with Friends and Family: Not on file   • Frequency of Social Gatherings with Friends and Family: Not on file   • Attends Yazidism Services: Not on file   • Active Member of Clubs or Organizations: Not on file   • Attends Club or Organization Meetings: Not on file   • Marital Status: Not on file   Intimate Partner Violence:    • Fear of Current or Ex-Partner: Not on file   • Emotionally Abused: Not on file   • Physically Abused: Not on file   • Sexually Abused: Not on file   Housing Stability:    • Unable to Pay for Housing in the Last Year: Not on file   • Number of Places Lived in the Last Year: Not on file   • Unstable Housing in the Last Year: Not on file         Physical Exam:  Ambulatory Vitals  /60 (BP Location: Left arm, Patient Position: Sitting, BP Cuff Size: Adult)   Pulse 74   Resp 14   Ht 1.549 m (5' 1\")   Wt 52.2 kg (115 lb)   SpO2 98%    BP Readings from Last 4 Encounters:   02/11/22 124/60 " "  02/03/22 116/70   10/26/21 126/62   10/20/21 112/66     Weight/BMI:   Vitals:    02/11/22 0942   BP: 124/60   Weight: 52.2 kg (115 lb)   Height: 1.549 m (5' 1\")    Body mass index is 21.73 kg/m².  Wt Readings from Last 4 Encounters:   02/11/22 52.2 kg (115 lb)   02/03/22 49.9 kg (110 lb)   10/26/21 53.5 kg (118 lb)   10/20/21 54 kg (119 lb)       Physical Exam  Constitutional:       General: She is not in acute distress.  HENT:      Head: Normocephalic and atraumatic.   Eyes:      Conjunctiva/sclera: Conjunctivae normal.      Pupils: Pupils are equal, round, and reactive to light.   Neck:      Vascular: No carotid bruit or JVD.   Cardiovascular:      Rate and Rhythm: Normal rate and regular rhythm.      Heart sounds: Normal heart sounds. No murmur heard.  No friction rub. No gallop.    Pulmonary:      Effort: Pulmonary effort is normal. No respiratory distress.      Breath sounds: Normal breath sounds. No wheezing or rales.   Chest:      Chest wall: No tenderness.   Abdominal:      General: Bowel sounds are normal. There is no distension.      Palpations: Abdomen is soft.   Musculoskeletal:      Cervical back: Normal range of motion and neck supple.   Skin:     General: Skin is warm and dry.   Neurological:      Mental Status: She is alert and oriented to person, place, and time.   Psychiatric:         Mood and Affect: Affect normal.         Judgment: Judgment normal.         Lab Data Review:  Lab Results   Component Value Date/Time    CHOLSTRLTOT 150 04/23/2021 09:40 AM    LDL 54 04/23/2021 09:40 AM    HDL 78 04/23/2021 09:40 AM    TRIGLYCERIDE 89 04/23/2021 09:40 AM       Lab Results   Component Value Date/Time    SODIUM 138 01/28/2022 03:51 PM    POTASSIUM 3.8 01/28/2022 03:51 PM    CHLORIDE 100 01/28/2022 03:51 PM    CO2 27 01/28/2022 03:51 PM    GLUCOSE 155 (H) 01/28/2022 03:51 PM    BUN 14 01/28/2022 03:51 PM    CREATININE 0.99 01/28/2022 03:51 PM     CrCl cannot be calculated (Patient's most recent lab " result is older than the maximum 7 days allowed.).  Lab Results   Component Value Date/Time    ALKPHOSPHAT 46 01/15/2020 11:25 AM    ASTSGOT 18 01/15/2020 11:25 AM    ALTSGPT 19 04/23/2021 09:40 AM    TBILIRUBIN 0.9 01/15/2020 11:25 AM      Lab Results   Component Value Date/Time    WBC 6.1 01/28/2022 03:51 PM     No results found for: HBA1C  No components found for: TROP      Cardiac Imaging and Procedures Review:      EKG 12/2/2019 interpreted by me sinus bradycardia    Renal artery us 12/2019  CONCLUSIONS   Velocities and waveforms are normal through the bilateral renal arteries..    No evidence of abdominal aortic aneurysm.     Carotid u/s 12/2019  CONCLUSIONS   No prior study is available for comparison..    Normal bilateral carotid exam.     TTE 12/2019  CONCLUSIONS  No prior study is available for comparison.   Normal left ventricular systolic function.  Left ventricular ejection fraction is visually estimated to be 70%.  Normal diastolic function.  The right ventricle was normal in size and function.  No significant valve disease or flow abnormalities.     zio monitor 1/2020  Underlying rhythm: Predominantly sinus. Lowest HR 40 bpm occurring overnight. Average heart rate 71 bpm.  Atrial events: Occasional supraventricular ectopy. Short runs of asymptomatic atrial tachycardia.  Ventricular events: Rare ventricular ectopy.  Patient events: Triggered events showing sinus.    Medical Decision Making:  Problem List Items Addressed This Visit     Essential hypertension (Chronic)    Dyslipidemia (Chronic)    TIA (transient ischemic attack) (Chronic)        HTN - controlled. Goal 120/80. Continue regimen. Especially ARB for CKD.    HLD / TIA hx - continue aspirin and statin. Annual lipids. Goal LDL <70.    It was my pleasure to meet with Ms. Jon.

## 2022-02-28 DIAGNOSIS — E03.9 HYPOTHYROIDISM, UNSPECIFIED TYPE: ICD-10-CM

## 2022-02-28 RX ORDER — LEVOTHYROXINE SODIUM 0.05 MG/1
50 TABLET ORAL
Qty: 90 TABLET | Refills: 0 | Status: SHIPPED | OUTPATIENT
Start: 2022-02-28 | End: 2022-06-22 | Stop reason: SDUPTHER

## 2022-04-22 ENCOUNTER — HOSPITAL ENCOUNTER (OUTPATIENT)
Dept: LAB | Facility: MEDICAL CENTER | Age: 82
End: 2022-04-22
Attending: INTERNAL MEDICINE
Payer: MEDICARE

## 2022-04-22 DIAGNOSIS — E03.9 ACQUIRED HYPOTHYROIDISM: ICD-10-CM

## 2022-04-22 DIAGNOSIS — E78.5 DYSLIPIDEMIA: ICD-10-CM

## 2022-04-22 DIAGNOSIS — I10 ESSENTIAL HYPERTENSION: ICD-10-CM

## 2022-04-22 LAB
ALT SERPL-CCNC: 12 U/L (ref 2–50)
ANION GAP SERPL CALC-SCNC: 10 MMOL/L (ref 7–16)
BUN SERPL-MCNC: 20 MG/DL (ref 8–22)
CALCIUM SERPL-MCNC: 10.2 MG/DL (ref 8.5–10.5)
CHLORIDE SERPL-SCNC: 101 MMOL/L (ref 96–112)
CHOLEST SERPL-MCNC: 195 MG/DL (ref 100–199)
CO2 SERPL-SCNC: 29 MMOL/L (ref 20–33)
CREAT SERPL-MCNC: 1.13 MG/DL (ref 0.5–1.4)
FASTING STATUS PATIENT QL REPORTED: NORMAL
GFR SERPLBLD CREATININE-BSD FMLA CKD-EPI: 49 ML/MIN/1.73 M 2
GLUCOSE SERPL-MCNC: 89 MG/DL (ref 65–99)
HDLC SERPL-MCNC: 78 MG/DL
LDLC SERPL CALC-MCNC: 106 MG/DL
POTASSIUM SERPL-SCNC: 3.9 MMOL/L (ref 3.6–5.5)
SODIUM SERPL-SCNC: 140 MMOL/L (ref 135–145)
TRIGL SERPL-MCNC: 55 MG/DL (ref 0–149)
TSH SERPL DL<=0.005 MIU/L-ACNC: 1.78 UIU/ML (ref 0.38–5.33)

## 2022-04-22 PROCEDURE — 84460 ALANINE AMINO (ALT) (SGPT): CPT

## 2022-04-22 PROCEDURE — 80048 BASIC METABOLIC PNL TOTAL CA: CPT

## 2022-04-22 PROCEDURE — 84443 ASSAY THYROID STIM HORMONE: CPT

## 2022-04-22 PROCEDURE — 80061 LIPID PANEL: CPT

## 2022-04-22 PROCEDURE — 36415 COLL VENOUS BLD VENIPUNCTURE: CPT

## 2022-04-28 ENCOUNTER — OFFICE VISIT (OUTPATIENT)
Dept: MEDICAL GROUP | Facility: PHYSICIAN GROUP | Age: 82
End: 2022-04-28
Payer: MEDICARE

## 2022-04-28 VITALS
OXYGEN SATURATION: 98 % | RESPIRATION RATE: 16 BRPM | DIASTOLIC BLOOD PRESSURE: 64 MMHG | HEIGHT: 61 IN | HEART RATE: 76 BPM | WEIGHT: 115.13 LBS | SYSTOLIC BLOOD PRESSURE: 132 MMHG | BODY MASS INDEX: 21.74 KG/M2 | TEMPERATURE: 99.5 F

## 2022-04-28 DIAGNOSIS — G45.9 TIA (TRANSIENT ISCHEMIC ATTACK): Chronic | ICD-10-CM

## 2022-04-28 DIAGNOSIS — H91.90 HEARING LOSS, UNSPECIFIED HEARING LOSS TYPE, UNSPECIFIED LATERALITY: ICD-10-CM

## 2022-04-28 DIAGNOSIS — E78.5 DYSLIPIDEMIA: Chronic | ICD-10-CM

## 2022-04-28 DIAGNOSIS — N18.31 STAGE 3A CHRONIC KIDNEY DISEASE: ICD-10-CM

## 2022-04-28 DIAGNOSIS — I10 ESSENTIAL HYPERTENSION: Chronic | ICD-10-CM

## 2022-04-28 DIAGNOSIS — E03.9 HYPOTHYROIDISM, UNSPECIFIED TYPE: Chronic | ICD-10-CM

## 2022-04-28 PROCEDURE — G0439 PPPS, SUBSEQ VISIT: HCPCS | Performed by: INTERNAL MEDICINE

## 2022-04-28 ASSESSMENT — PATIENT HEALTH QUESTIONNAIRE - PHQ9: CLINICAL INTERPRETATION OF PHQ2 SCORE: 0

## 2022-04-28 ASSESSMENT — ENCOUNTER SYMPTOMS: GENERAL WELL-BEING: GOOD

## 2022-04-28 ASSESSMENT — ACTIVITIES OF DAILY LIVING (ADL): BATHING_REQUIRES_ASSISTANCE: 0

## 2022-04-28 NOTE — PROGRESS NOTES
Chief Complaint   Patient presents with   • Annual Exam       HPI:  Danielle Jon is a 81 y.o. here for Medicare Annual Wellness Visit     Patient Active Problem List    Diagnosis Date Noted   • CKD (chronic kidney disease), stage III (HCC) 11/29/2017   • Hearing loss 04/28/2022   • Vitamin D deficiency 06/16/2020   • Iron deficiency 01/22/2020   • 10 year risk of MI or stroke 7.5% or greater 12/02/2019   • TIA (transient ischemic attack) 12/02/2019   • Essential tremor 11/21/2019   • Dyslipidemia 11/21/2019   • Hypomagnesemia 11/21/2019   • Gout of both feet 06/06/2017   • Essential hypertension 02/19/2016   • Hypothyroidism 02/19/2016       Current Outpatient Medications   Medication Sig Dispense Refill   • levothyroxine (SYNTHROID) 50 MCG Tab Take 1 Tablet by mouth every morning on an empty stomach. 90 Tablet 0   • chlorthalidone (HYGROTON) 50 MG Tab TAKE ONE TABLET BY MOUTH EVERY DAY; STOP AMLODIPINE AND HCTZ 90 Tablet 0   • losartan (COZAAR) 100 MG Tab TAKE ONE TABLET BY MOUTH EVERY DAY 90 Tablet 3   • allopurinol (ZYLOPRIM) 100 MG Tab TAKE ONE TABLET BY MOUTH EVERY DAY 90 Tablet 1   • atorvastatin (LIPITOR) 20 MG Tab TAKE ONE TABLET BY MOUTH AT BEDTIME 90 Tablet 3   • potassium chloride SA (KDUR) 20 MEQ Tab CR TAKE ONE TABLET BY MOUTH TWICE A  tablet 1   • magnesium oxide (MAG-OX) 400 MG Tab Take 400 mg by mouth every day.     • aspirin 81 MG tablet Take 81 mg by mouth every day.     • Acetaminophen (TYLENOL 8 HOUR PO) Take  by mouth.     • Cetirizine HCl 10 MG Cap Take  by mouth. Patient requesting refill     • cyanocobalamin (VITAMIN B-12) 500 MCG Tab Take 500 mcg by mouth every day.       No current facility-administered medications for this visit.          Current supplements as per medication list.     Allergies: Niacin    Current social contact/activities: yes     She  reports that she has never smoked. She has never used smokeless tobacco. She reports current alcohol use of about 8.4 oz of  alcohol per week. She reports that she does not use drugs.  Counseling given: Not Answered      DPA/Advanced Directive:  Patient has Advanced Directive, but it is not on file. Instructed to bring in a copy to scan into their chart.    ROS:    Gait: Uses no assistive device  Ostomy: No  Other tubes: No  Amputations: No  Chronic oxygen use: No  Last eye exam: 10/2021  Wears hearing aids: No   : Denies any urinary leakage during the last 6 months    Screening:    Depression Screening  Little interest or pleasure in doing things?  0 - not at all  Feeling down, depressed , or hopeless? 0 - not at all  Patient Health Questionnaire Score: 0     If depressive symptoms identified deferred to follow up visit unless specifically addressed in assessment and plan.    Interpretation of PHQ-9 Total Score   Score Severity   1-4 No Depression   5-9 Mild Depression   10-14 Moderate Depression   15-19 Moderately Severe Depression   20-27 Severe Depression    Screening for Cognitive Impairment  Three Minute Recall (daughter, heaven, mountain) 2/3    Vitaliy clock face with all 12 numbers and set the hands to show 10 past 11.  Yes    Cognitive concerns identified deferred for follow up unless specifically addressed in assessment and plan.    Fall Risk Assessment  Has the patient had two or more falls in the last year or any fall with injury in the last year?  No    Safety Assessment  Throw rugs on floor.  Yes  Handrails on all stairs.  Yes  Good lighting in all hallways.  Yes  Difficulty hearing.  Yes  Patient counseled about all safety risks that were identified.    Functional Assessment ADLs  Are there any barriers preventing you from cooking for yourself or meeting nutritional needs?  No.    Are there any barriers preventing you from driving safely or obtaining transportation?  No.    Are there any barriers preventing you from using a telephone or calling for help?  No.    Are there any barriers preventing you from shopping?  No.     Are there any barriers preventing you from taking care of your own finances?  No.    Are there any barriers preventing you from managing your medications?  No.    Are there any barriers preventing you from showering, bathing or dressing yourself?  No.    Are you currently engaging in any exercise or physical activity?  Yes.     What is your perception of your health?  Good.      Health Maintenance Summary          Annual Wellness Visit (Every 366 Days) Next due on 4/29/2023 04/28/2022  Done    09/01/2017  Visit Dx: Medicare annual wellness visit, initial          IMM DTaP/Tdap/Td Vaccine (4 - Td or Tdap) Next due on 4/26/2031 04/26/2021  Imm Admin: Tdap Vaccine    07/22/2010  Imm Admin: Tdap Vaccine    02/01/2000  Imm Admin: Dtap Vaccine          IMM PNEUMOCOCCAL VACCINE: 65+ Years (Series Information) Completed    12/01/2015  Imm Admin: Pneumococcal Conjugate Vaccine (Prevnar/PCV-13)    08/10/2015  Imm Admin: Pneumococcal Conjugate Vaccine (Prevnar/PCV-13)    05/14/2007  Imm Admin: Pneumococcal polysaccharide vaccine (PPSV-23)          IMM ZOSTER VACCINES (Series Information) Completed    05/01/2021  Imm Admin: Zoster Vaccine Recombinant (RZV) (SHINGRIX)    10/10/2020  Imm Admin: Zoster Vaccine Recombinant (RZV) (SHINGRIX)    07/10/2007  Imm Admin: Zoster Vaccine Live (ZVL) (Zostavax) - HISTORICAL DATA          IMM INFLUENZA (Series Information) Completed    09/21/2021  Imm Admin: Influenza Vaccine Adult HD    10/10/2020  Imm Admin: Influenza Vaccine Adult HD    10/22/2019  Imm Admin: Influenza Vaccine Adult HD    10/08/2018  Imm Admin: Influenza Vaccine Adult HD    10/11/2017  Imm Admin: Influenza Vaccine Adult HD    Only the first 5 history entries have been loaded, but more history exists.          COVID-19 Vaccine (Series Information) Completed    04/14/2022  Imm Admin: PFIZER PURPLE CAP SARS-COV-2 VACCINATION (12+)    02/12/2021  Imm Admin: Pfizer SARS-CoV-2 Vaccine    01/22/2021  Imm Admin: Pfizer  SARS-CoV-2 Vaccine          IMM HEP B VACCINE (Series Information) Aged Out    No completion history exists for this topic.          IMM MENINGOCOCCAL VACCINE (MCV4) (Series Information) Aged Out    No completion history exists for this topic.          Discontinued - MAMMOGRAM  Discontinued    No completion history exists for this topic.          Discontinued - BONE DENSITY  Discontinued    No completion history exists for this topic.          Discontinued - PAP SMEAR  Discontinued    No completion history exists for this topic.          Discontinued - COLORECTAL CANCER SCREENING  Discontinued    No completion history exists for this topic.                Patient Care Team:  Terell Acuna M.D. as PCP - General (Internal Medicine)  rAuna Wilkerson M.D. (Nephrology)        Social History     Tobacco Use   • Smoking status: Never Smoker   • Smokeless tobacco: Never Used   Vaping Use   • Vaping Use: Never used   Substance Use Topics   • Alcohol use: Yes     Alcohol/week: 8.4 oz     Types: 14 Glasses of wine per week     Comment: 2 glasses of wine daily   • Drug use: No     Family History   Problem Relation Age of Onset   • Hypertension Mother    • Heart Disease Mother         chf   • Lung Disease Father    • Hypertension Sister    • Hyperlipidemia Sister    • Hypertension Brother      She  has a past medical history of Cataract, CKD (chronic kidney disease), stage III (HCC) (11/29/2017), Essential hypertension (2/19/2016), Gout of both feet (6/6/2017), Hypercalcemia (12/14/2017), Hypertriglyceridemia (4/4/2017), Hypothyroidism (2/19/2016), Irregular heart beat (10/22/2019), and Stroke ([facility]).    She has no past medical history of Anxiety, Blood transfusion without reported diagnosis, Cancer (HCC), CHF (congestive heart failure) (HCC), Clotting disorder (HCC), COPD (chronic obstructive pulmonary disease) (HCC), Depression, Diabetes (HCC), GERD (gastroesophageal reflux disease), Heart attack (HCC), IBD (inflammatory bowel  "disease), Migraine, Muscle disorder, Osteoporosis, Seizure (HCC), Substance abuse (HCC), or Urinary tract infection.   Past Surgical History:   Procedure Laterality Date   • CATARACT EXTRACTION WITH IOL Bilateral 2014   • EYE SURGERY      lasix    • MAMMOPLASTY REDUCTION         Exam:   /64 (BP Location: Right arm, Patient Position: Sitting, BP Cuff Size: Adult)   Pulse 76   Temp 37.5 °C (99.5 °F) (Temporal)   Resp 16   Ht 1.549 m (5' 1\")   Wt 52.2 kg (115 lb 2 oz)   SpO2 98%  Body mass index is 21.75 kg/m².    Hearing good.    Dentition good  Alert, oriented in no acute distress.  Eye contact is good, speech goal directed, affect calm    Results for BILL WINTERS (MRN [id number]) as of 4/28/2022 13:54   Ref. Range 4/22/2022 09:56   Sodium Latest Ref Range: 135 - 145 mmol/L 140   Potassium Latest Ref Range: 3.6 - 5.5 mmol/L 3.9   Chloride Latest Ref Range: 96 - 112 mmol/L 101   Co2 Latest Ref Range: 20 - 33 mmol/L 29   Anion Gap Latest Ref Range: 7.0 - 16.0  10.0   Glucose Latest Ref Range: 65 - 99 mg/dL 89   Bun Latest Ref Range: 8 - 22 mg/dL 20   Creatinine Latest Ref Range: 0.50 - 1.40 mg/dL 1.13   GFR (CKD-EPI) Latest Ref Range: >60 mL/min/1.73 m 2 49 (A)   Calcium Latest Ref Range: 8.5 - 10.5 mg/dL 10.2   ALT(SGPT) Latest Ref Range: 2 - 50 U/L 12   Fasting Status Unknown Fasting   Cholesterol,Tot Latest Ref Range: 100 - 199 mg/dL 195   Triglycerides Latest Ref Range: 0 - 149 mg/dL 55   HDL Latest Ref Range: >=40 mg/dL 78   LDL Latest Ref Range: <100 mg/dL 106 (H)       Assessment and Plan. The following treatment and monitoring plan is recommended:        CKD (chronic kidney disease), stage III (HCC)  Chronic condition.  Patient followed by nephrology service.  Her baseline GFR 40 to 50s.  Advised the patient to avoid NSAIDs.    Dyslipidemia  Chronic condition.  Patient is being treated with atorvastatin.  Recent lab test result discussed with the patient.  Advised the patient regarding diet and " exercise.    Essential hypertension  Chronic stable condition.  The patient is now taking chlorthalidone and losartan.  Her blood pressure has been well controlled at home.    Hypothyroidism  This is a chronic condition.  Patient is taking allopurinol daily.  She denies recent gout flareup.    TIA (transient ischemic attack)  Patient is presently taking aspirin.  She denies any new neurologic symptoms.            Health Care Screening: Age-appropriate preventive services recommended by USPTF and ACIP covered by Medicare were discussed today. Services ordered if indicated and agreed upon by the patient.  Referrals offered: Community-based lifestyle interventions to reduce health risks and promote self-management and wellness, fall prevention, nutrition, physical activity, tobacco-use cessation, weight loss, and mental health services as per orders if indicated.    Discussion today about general wellness and lifestyle habits:    · Prevent falls and reduce trip hazards; Cautioned about securing or removing rugs.  · Have a working fire alarm and carbon monoxide detector;   · Engage in regular physical activity and social activities     Follow-up: Return in about 6 months (around 10/28/2022).

## 2022-04-28 NOTE — ASSESSMENT & PLAN NOTE
Chronic stable condition.  The patient is now taking chlorthalidone and losartan.  Her blood pressure has been well controlled at home.

## 2022-04-28 NOTE — ASSESSMENT & PLAN NOTE
Chronic condition.  Patient followed by nephrology service.  Her baseline GFR 40 to 50s.  Advised the patient to avoid NSAIDs.

## 2022-04-28 NOTE — ASSESSMENT & PLAN NOTE
Chronic condition.  Patient is being treated with atorvastatin.  Recent lab test result discussed with the patient.  Advised the patient regarding diet and exercise.

## 2022-04-28 NOTE — ASSESSMENT & PLAN NOTE
This is a chronic condition.  Patient is taking allopurinol daily.  She denies recent gout flareup.

## 2022-05-07 DIAGNOSIS — I10 ESSENTIAL HYPERTENSION: Chronic | ICD-10-CM

## 2022-05-10 RX ORDER — CHLORTHALIDONE 50 MG/1
TABLET ORAL
Qty: 90 TABLET | Refills: 3 | Status: SHIPPED | OUTPATIENT
Start: 2022-05-10 | End: 2023-05-25

## 2022-05-13 DIAGNOSIS — M10.9 GOUT OF BOTH FEET: ICD-10-CM

## 2022-05-13 RX ORDER — ALLOPURINOL 100 MG/1
TABLET ORAL
Qty: 90 TABLET | Refills: 1 | Status: SHIPPED | OUTPATIENT
Start: 2022-05-13 | End: 2022-11-21

## 2022-06-08 ENCOUNTER — APPOINTMENT (OUTPATIENT)
Dept: NEPHROLOGY | Facility: MEDICAL CENTER | Age: 82
End: 2022-06-08
Payer: MEDICARE

## 2022-06-22 DIAGNOSIS — E03.9 HYPOTHYROIDISM, UNSPECIFIED TYPE: ICD-10-CM

## 2022-06-22 RX ORDER — LEVOTHYROXINE SODIUM 0.05 MG/1
50 TABLET ORAL
Qty: 90 TABLET | Refills: 3 | Status: SHIPPED | OUTPATIENT
Start: 2022-06-22 | End: 2023-09-05

## 2022-07-23 ENCOUNTER — HOSPITAL ENCOUNTER (OUTPATIENT)
Dept: LAB | Facility: MEDICAL CENTER | Age: 82
End: 2022-07-23
Attending: INTERNAL MEDICINE
Payer: MEDICARE

## 2022-07-23 DIAGNOSIS — N18.31 STAGE 3A CHRONIC KIDNEY DISEASE: ICD-10-CM

## 2022-07-23 DIAGNOSIS — E21.1 HYPERPARATHYROIDISM DUE TO VITAMIN D DEFICIENCY (HCC): ICD-10-CM

## 2022-07-23 DIAGNOSIS — R80.9 MICROALBUMINURIA: ICD-10-CM

## 2022-07-23 DIAGNOSIS — I10 ESSENTIAL HYPERTENSION: ICD-10-CM

## 2022-07-23 LAB
25(OH)D3 SERPL-MCNC: 80 NG/ML (ref 30–100)
ANION GAP SERPL CALC-SCNC: 12 MMOL/L (ref 7–16)
BUN SERPL-MCNC: 27 MG/DL (ref 8–22)
CALCIUM SERPL-MCNC: 10.2 MG/DL (ref 8.5–10.5)
CHLORIDE SERPL-SCNC: 99 MMOL/L (ref 96–112)
CO2 SERPL-SCNC: 28 MMOL/L (ref 20–33)
CREAT SERPL-MCNC: 1.13 MG/DL (ref 0.5–1.4)
CREAT UR-MCNC: 150.16 MG/DL
GFR SERPLBLD CREATININE-BSD FMLA CKD-EPI: 49 ML/MIN/1.73 M 2
GLUCOSE SERPL-MCNC: 117 MG/DL (ref 65–99)
MICROALBUMIN UR-MCNC: 2 MG/DL
MICROALBUMIN/CREAT UR: 13 MG/G (ref 0–30)
POTASSIUM SERPL-SCNC: 4 MMOL/L (ref 3.6–5.5)
PTH-INTACT SERPL-MCNC: 81 PG/ML (ref 14–72)
SODIUM SERPL-SCNC: 139 MMOL/L (ref 135–145)

## 2022-07-23 PROCEDURE — 82306 VITAMIN D 25 HYDROXY: CPT

## 2022-07-23 PROCEDURE — 82043 UR ALBUMIN QUANTITATIVE: CPT

## 2022-07-23 PROCEDURE — 83970 ASSAY OF PARATHORMONE: CPT

## 2022-07-23 PROCEDURE — 82570 ASSAY OF URINE CREATININE: CPT

## 2022-07-23 PROCEDURE — 36415 COLL VENOUS BLD VENIPUNCTURE: CPT

## 2022-07-23 PROCEDURE — 80048 BASIC METABOLIC PNL TOTAL CA: CPT

## 2022-07-27 ENCOUNTER — OFFICE VISIT (OUTPATIENT)
Dept: NEPHROLOGY | Facility: MEDICAL CENTER | Age: 82
End: 2022-07-27
Payer: MEDICARE

## 2022-07-27 VITALS
WEIGHT: 111 LBS | RESPIRATION RATE: 12 BRPM | SYSTOLIC BLOOD PRESSURE: 150 MMHG | DIASTOLIC BLOOD PRESSURE: 60 MMHG | TEMPERATURE: 99.2 F | HEART RATE: 73 BPM | OXYGEN SATURATION: 98 % | BODY MASS INDEX: 20.97 KG/M2

## 2022-07-27 DIAGNOSIS — N18.31 STAGE 3A CHRONIC KIDNEY DISEASE: ICD-10-CM

## 2022-07-27 DIAGNOSIS — R80.9 MICROALBUMINURIA: ICD-10-CM

## 2022-07-27 DIAGNOSIS — M10.9 GOUT OF BOTH FEET: ICD-10-CM

## 2022-07-27 DIAGNOSIS — E21.1 HYPERPARATHYROIDISM DUE TO VITAMIN D DEFICIENCY (HCC): ICD-10-CM

## 2022-07-27 DIAGNOSIS — I10 ESSENTIAL HYPERTENSION: ICD-10-CM

## 2022-07-27 DIAGNOSIS — D64.9 ANEMIA, UNSPECIFIED TYPE: ICD-10-CM

## 2022-07-27 DIAGNOSIS — E55.9 VITAMIN D DEFICIENCY: ICD-10-CM

## 2022-07-27 PROCEDURE — 99214 OFFICE O/P EST MOD 30 MIN: CPT | Performed by: INTERNAL MEDICINE

## 2022-07-27 ASSESSMENT — ENCOUNTER SYMPTOMS
COUGH: 0
FEVER: 0
EYES NEGATIVE: 1
WHEEZING: 0
SINUS PAIN: 0
FLANK PAIN: 0
HEMOPTYSIS: 0
CHILLS: 0
WEIGHT LOSS: 0
PALPITATIONS: 0
SHORTNESS OF BREATH: 0
ORTHOPNEA: 0
ABDOMINAL PAIN: 0
HYPERTENSION: 1
NAUSEA: 0
MEMORY LOSS: 1
VOMITING: 0

## 2022-07-27 NOTE — PROGRESS NOTES
Subjective:      Danielle Jon is a 81 y.o. female who presents with Follow-Up and Chronic Kidney Disease            Chronic Kidney Disease  Pertinent negatives include no abdominal pain, chest pain, chills, congestion, coughing, fever, nausea or vomiting.   Hypertension  Pertinent negatives include no chest pain, malaise/fatigue, orthopnea, palpitations or shortness of breath.     Danielle is coming today for HTN, CKD IIIa f/u  C/o memory disturbances  No difficulties to urinate  No dysuria/hematuria/flank pain  HTN: elevated BP improved  CKD IIIa - creat level stable at 1.1's  vit D level well controlled  Elevated PTH  - improving -parathyroid  nuclear test -negative for parathyroid adenoma.    Review of Systems   Constitutional: Negative for chills, fever, malaise/fatigue and weight loss.   HENT: Negative for congestion, hearing loss and sinus pain.    Eyes: Negative.    Respiratory: Negative for cough, hemoptysis, shortness of breath and wheezing.    Cardiovascular: Negative for chest pain, palpitations, orthopnea and leg swelling.   Gastrointestinal: Negative for abdominal pain, nausea and vomiting.   Genitourinary: Negative for dysuria, flank pain, frequency, hematuria and urgency.   Skin: Negative.    Psychiatric/Behavioral: Positive for memory loss.   All other systems reviewed and are negative.    Past Medical History:   Diagnosis Date   • Cataract    • CKD (chronic kidney disease), stage III (HCC) 11/29/2017   • Essential hypertension 2/19/2016   • Gout of both feet 6/6/2017   • Hypercalcemia 12/14/2017   • Hypertriglyceridemia 4/4/2017   • Hypothyroidism 2/19/2016   • Irregular heart beat 10/22/2019   • Stroke (HCC)     TIA at age 33       Family History   Problem Relation Age of Onset   • Hypertension Mother    • Heart Disease Mother         chf   • Lung Disease Father    • Hypertension Sister    • Hyperlipidemia Sister    • Hypertension Brother        Social History     Socioeconomic History   •  Marital status:    Tobacco Use   • Smoking status: Never Smoker   • Smokeless tobacco: Never Used   Vaping Use   • Vaping Use: Never used   Substance and Sexual Activity   • Alcohol use: Yes     Alcohol/week: 8.4 oz     Types: 14 Glasses of wine per week     Comment: 2 glasses of wine daily   • Drug use: No   • Sexual activity: Yes     Partners: Male   Social History Narrative    Retired from [employer].  Lives with her .  Having some hearing concerns.  Walking without walking assistance, mentally and physically.  Independent. No social or domestic concerns.     Social Determinants of Health     Financial Resource Strain: Low Risk    • Difficulty of Paying Living Expenses: Not hard at all   Food Insecurity: No Food Insecurity   • Worried About Running Out of Food in the Last Year: Never true   • Ran Out of Food in the Last Year: Never true   Transportation Needs: No Transportation Needs   • Lack of Transportation (Medical): No   • Lack of Transportation (Non-Medical): No   Physical Activity: Insufficiently Active   • Days of Exercise per Week: 5 days   • Minutes of Exercise per Session: 20 min   Stress: No Stress Concern Present   • Feeling of Stress : Only a little   Social Connections: Moderately Isolated   • Frequency of Communication with Friends and Family: Twice a week   • Frequency of Social Gatherings with Friends and Family: Once a week   • Attends Yazidism Services: Never   • Active Member of Clubs or Organizations: No   • Marital Status:    Housing Stability: Unknown   • Unable to Pay for Housing in the Last Year: No   • Number of Places Lived in the Last Year: 1          Objective:     BP (!) 150/60   Pulse 73   Temp 37.3 °C (99.2 °F) (Temporal)   Resp 12   Wt 50.3 kg (111 lb)   SpO2 98%   BMI 20.97 kg/m²      Physical Exam  Constitutional:       General: She is not in acute distress.     Appearance: Normal appearance. She is well-developed. She is not diaphoretic.   HENT:       Head: Normocephalic and atraumatic.      Nose: Nose normal.      Mouth/Throat:      Mouth: Mucous membranes are moist.      Pharynx: Oropharynx is clear.   Eyes:      General: No scleral icterus.     Extraocular Movements: Extraocular movements intact.      Conjunctiva/sclera: Conjunctivae normal.      Pupils: Pupils are equal, round, and reactive to light.   Cardiovascular:      Rate and Rhythm: Normal rate and regular rhythm.      Pulses: Normal pulses.      Heart sounds: Normal heart sounds.     No friction rub. No gallop.   Pulmonary:      Effort: Pulmonary effort is normal.      Breath sounds: Normal breath sounds.   Abdominal:      General: Bowel sounds are normal. There is no distension.      Palpations: Abdomen is soft. There is no mass.      Tenderness: There is no abdominal tenderness. There is no left CVA tenderness or guarding.   Musculoskeletal:      Cervical back: Normal range of motion and neck supple.      Right lower leg: No edema.      Left lower leg: No edema.   Skin:     General: Skin is warm.      Coloration: Skin is not pale.      Findings: No erythema or rash.   Neurological:      Mental Status: She is alert and oriented to person, place, and time.      Cranial Nerves: No cranial nerve deficit.      Coordination: Coordination normal.   Psychiatric:         Mood and Affect: Mood normal.         Behavior: Behavior normal.         Thought Content: Thought content normal.         Judgment: Judgment normal.               Laboratory results reviewed: d/w Pt  Lab Results   Component Value Date/Time    CREATININE 1.13 07/23/2022 11:08 AM    POTASSIUM 4.0 07/23/2022 11:08 AM       Assessment/Plan:     1. CKD (chronic kidney disease), stage IIIa      creat level stable at baseline -to monitor      2. Essential hypertension      BP well controlled - continue current treatment    3. Vit D def      vit D level - well controlled-continue supplementation      elevated PTH -improving      Nuclear scan  negative for parathyroid adenoma.    4. Anemia : Hb level WNL    5. Gout; uric acid well controlled with allopurinol      To monitor    6. Microalbuminuria mild -controlled with ARB      Recs: Continue current medications, low Na diet, monitor BP             Keep well hydrated             F/u in 4-5 months

## 2022-08-03 DIAGNOSIS — Z51.81 MEDICATION MONITORING ENCOUNTER: ICD-10-CM

## 2022-08-03 DIAGNOSIS — E87.6 HYPOKALEMIA: ICD-10-CM

## 2022-08-03 RX ORDER — POTASSIUM CHLORIDE 20 MEQ/1
TABLET, EXTENDED RELEASE ORAL
Qty: 180 TABLET | Refills: 1 | Status: SHIPPED | OUTPATIENT
Start: 2022-08-03 | End: 2023-09-05

## 2022-10-27 ENCOUNTER — HOSPITAL ENCOUNTER (OUTPATIENT)
Dept: LAB | Facility: MEDICAL CENTER | Age: 82
End: 2022-10-27
Attending: INTERNAL MEDICINE
Payer: MEDICARE

## 2022-10-27 DIAGNOSIS — E55.9 VITAMIN D DEFICIENCY: ICD-10-CM

## 2022-10-27 DIAGNOSIS — D64.9 ANEMIA, UNSPECIFIED TYPE: ICD-10-CM

## 2022-10-27 DIAGNOSIS — N18.31 STAGE 3A CHRONIC KIDNEY DISEASE: ICD-10-CM

## 2022-10-27 LAB
25(OH)D3 SERPL-MCNC: 97 NG/ML (ref 30–100)
ANION GAP SERPL CALC-SCNC: 11 MMOL/L (ref 7–16)
APPEARANCE UR: CLEAR
BACTERIA #/AREA URNS HPF: NEGATIVE /HPF
BILIRUB UR QL STRIP.AUTO: NEGATIVE
BUN SERPL-MCNC: 17 MG/DL (ref 8–22)
CALCIUM SERPL-MCNC: 10.3 MG/DL (ref 8.5–10.5)
CHLORIDE SERPL-SCNC: 99 MMOL/L (ref 96–112)
CO2 SERPL-SCNC: 26 MMOL/L (ref 20–33)
COLOR UR: YELLOW
CREAT SERPL-MCNC: 1.07 MG/DL (ref 0.5–1.4)
EPI CELLS #/AREA URNS HPF: ABNORMAL /HPF
ERYTHROCYTE [DISTWIDTH] IN BLOOD BY AUTOMATED COUNT: 47.8 FL (ref 35.9–50)
GFR SERPLBLD CREATININE-BSD FMLA CKD-EPI: 52 ML/MIN/1.73 M 2
GLUCOSE SERPL-MCNC: 150 MG/DL (ref 65–99)
GLUCOSE UR STRIP.AUTO-MCNC: NEGATIVE MG/DL
HCT VFR BLD AUTO: 44.8 % (ref 37–47)
HGB BLD-MCNC: 16.6 G/DL (ref 12–16)
HYALINE CASTS #/AREA URNS LPF: ABNORMAL /LPF
KETONES UR STRIP.AUTO-MCNC: NEGATIVE MG/DL
LEUKOCYTE ESTERASE UR QL STRIP.AUTO: ABNORMAL
MAGNESIUM SERPL-MCNC: 1.3 MG/DL (ref 1.5–2.5)
MCH RBC QN AUTO: 37.6 PG (ref 27–33)
MCHC RBC AUTO-ENTMCNC: 37.1 G/DL (ref 33.6–35)
MCV RBC AUTO: 101.4 FL (ref 81.4–97.8)
MICRO URNS: ABNORMAL
NITRITE UR QL STRIP.AUTO: NEGATIVE
PH UR STRIP.AUTO: 7 [PH] (ref 5–8)
PLATELET # BLD AUTO: 285 K/UL (ref 164–446)
PMV BLD AUTO: 10.6 FL (ref 9–12.9)
POTASSIUM SERPL-SCNC: 3.3 MMOL/L (ref 3.6–5.5)
PROT UR QL STRIP: NEGATIVE MG/DL
PTH-INTACT SERPL-MCNC: 52.9 PG/ML (ref 14–72)
RBC # BLD AUTO: 4.42 M/UL (ref 4.2–5.4)
RBC # URNS HPF: ABNORMAL /HPF
RBC UR QL AUTO: NEGATIVE
RENAL EPI CELLS #/AREA URNS HPF: ABNORMAL /HPF
SODIUM SERPL-SCNC: 136 MMOL/L (ref 135–145)
SP GR UR STRIP.AUTO: 1.01
URATE SERPL-MCNC: 3.9 MG/DL (ref 1.9–8.2)
UROBILINOGEN UR STRIP.AUTO-MCNC: 1 MG/DL
WBC # BLD AUTO: 7 K/UL (ref 4.8–10.8)
WBC #/AREA URNS HPF: ABNORMAL /HPF

## 2022-10-27 PROCEDURE — 80048 BASIC METABOLIC PNL TOTAL CA: CPT

## 2022-10-27 PROCEDURE — 36415 COLL VENOUS BLD VENIPUNCTURE: CPT

## 2022-10-27 PROCEDURE — 85027 COMPLETE CBC AUTOMATED: CPT

## 2022-10-27 PROCEDURE — 84550 ASSAY OF BLOOD/URIC ACID: CPT

## 2022-10-27 PROCEDURE — 83970 ASSAY OF PARATHORMONE: CPT

## 2022-10-27 PROCEDURE — 81001 URINALYSIS AUTO W/SCOPE: CPT

## 2022-10-27 PROCEDURE — 82306 VITAMIN D 25 HYDROXY: CPT

## 2022-10-27 PROCEDURE — 83735 ASSAY OF MAGNESIUM: CPT

## 2022-11-01 ENCOUNTER — OFFICE VISIT (OUTPATIENT)
Dept: MEDICAL GROUP | Facility: PHYSICIAN GROUP | Age: 82
End: 2022-11-01
Payer: MEDICARE

## 2022-11-01 VITALS
SYSTOLIC BLOOD PRESSURE: 134 MMHG | HEART RATE: 78 BPM | TEMPERATURE: 98 F | OXYGEN SATURATION: 99 % | RESPIRATION RATE: 16 BRPM | HEIGHT: 61 IN | BODY MASS INDEX: 21.07 KG/M2 | WEIGHT: 111.6 LBS | DIASTOLIC BLOOD PRESSURE: 64 MMHG

## 2022-11-01 DIAGNOSIS — R73.9 HYPERGLYCEMIA: ICD-10-CM

## 2022-11-01 DIAGNOSIS — E87.6 HYPOKALEMIA: ICD-10-CM

## 2022-11-01 DIAGNOSIS — E03.9 HYPOTHYROIDISM, UNSPECIFIED TYPE: ICD-10-CM

## 2022-11-01 DIAGNOSIS — N18.31 STAGE 3A CHRONIC KIDNEY DISEASE: ICD-10-CM

## 2022-11-01 DIAGNOSIS — R41.3 MEMORY DEFICIT: ICD-10-CM

## 2022-11-01 DIAGNOSIS — I10 ESSENTIAL HYPERTENSION: Chronic | ICD-10-CM

## 2022-11-01 PROCEDURE — 99214 OFFICE O/P EST MOD 30 MIN: CPT | Performed by: INTERNAL MEDICINE

## 2022-11-01 NOTE — ASSESSMENT & PLAN NOTE
Chronic condition.  The patient is taking losartan and hydrochlorothiazide.  Patient reported that her blood pressure has been well controlled at home.  No significant side effects reported.

## 2022-11-01 NOTE — PROGRESS NOTES
PRIMARY CARE CLINIC VISIT    Chief complaint:    Memory issues  Follow-up hypertension      History of Present Illness     Memory deficit  This is a new condition.  The patient reported having issue with short-term memory since approximately the last 6 months.  Patient stated that she has difficulty with direction and relying more on the GPS.  She also reporting having short-term memory issues especially with conversation being discussed with friends and family members.  The patient denies any headaches.  No prior history of a stroke.  Patient reported that her brother was diagnosed with dementia.    CKD (chronic kidney disease), stage III ([facility])  This is a chronic condition.  The patient presently followed by nephrology service.    Essential hypertension  Chronic condition.  The patient is taking losartan and hydrochlorothiazide.  Patient reported that her blood pressure has been well controlled at home.  No significant side effects reported.    Hypothyroidism  Chronic condition.  The patient is taking levothyroxine.  Patient is due for lab test.  TSH ordered today.    Hyperglycemia  Chart review show patient had blood test done ordered by nephrologist recently.  The sugar level noted to be high.  The patient stated that this was a nonfasting blood test.  I recommend a fasting blood sugar and A1c to be done for follow-up.    Hypokalemia  This is a new condition noted with recent blood tests ordered by nephrologist.  Potassium level 3.3.  The patient is asymptomatic.  Patient stated that she has not been taking potassium chloride on a consistent basis.  Advised the patient to start potassium chloride 20 M EQ daily.  I have ordered to repeat the potassium blood test in about 7 days.    Current Outpatient Medications on File Prior to Visit   Medication Sig Dispense Refill    potassium chloride SA (KDUR) 20 MEQ Tab CR TAKE ONE TABLET BY MOUTH TWO TIMES A  Tablet 1    levothyroxine (SYNTHROID) 50 MCG Tab Take 1  "Tablet by mouth every morning on an empty stomach. 90 Tablet 3    allopurinol (ZYLOPRIM) 100 MG Tab TAKE ONE TABLET BY MOUTH EVERY DAY 90 Tablet 1    chlorthalidone (HYGROTON) 50 MG Tab TAKE ONE TABLET BY MOUTH EVERY DAY; STOP AMLODIPINE AND HCTZ 90 Tablet 3    losartan (COZAAR) 100 MG Tab TAKE ONE TABLET BY MOUTH EVERY DAY 90 Tablet 3    magnesium oxide (MAG-OX) 400 MG Tab Take 400 mg by mouth every day.      cyanocobalamin (VITAMIN B-12) 500 MCG Tab Take 500 mcg by mouth every day.      aspirin 81 MG tablet Take 81 mg by mouth every day.       No current facility-administered medications on file prior to visit.        Allergies: Niacin    ROS  As per HPI above. All other systems reviewed and negative.      Past Medical, Social, and Family history reviewed and updated in EPIC     Objective     /64 (BP Location: Right arm, Patient Position: Sitting, BP Cuff Size: Adult)   Pulse 78   Temp 36.7 °C (98 °F) (Temporal)   Resp 16   Ht 1.549 m (5' 1\")   Wt 50.6 kg (111 lb 9.6 oz)   SpO2 99%    Body mass index is 21.09 kg/m².    General: alert in no apparent distress.  Cardiovascular: regular rate and rhythm  Pulmonary: lungs : no wheezing   Gastrointestinal: BS present. No obvious mass noted  Neuro cranial nerves II to XII grossly intact.  Mental status and speech appropriate.  Gait no ataxia noted        Assessment and Plan     1. Memory deficit  This is a new condition.  Exact cause unclear.  I have requested brain CT scan to be done and refer the patient to neurology for further evaluation.  - Referral to Neurology  - CT-HEAD W/O; Future    2. Hypokalemia.  New condition noted with recent lab test.  Patient asymptomatic.  Lab test result discussed with the patient.  Advised the patient to resume taking potassium chloride 20 M EQ daily.  Patient to repeat potassium blood test in 7 days.  - POTASSIUM SERUM (K); Future    3. Hyperglycemia  Per patient report recent blood test was nonfasting  I have ordered " fasting blood test to check glucose and A1c.  Recommend follow-up after blood test done  - HEMOGLOBIN A1C; Future  - Blood Glucose; Future    4. Stage 3a chronic kidney disease (HCC)  Chronic stable condition.  Continue follow-up with nephrology service.  Advised the patient to avoid NSAIDs    5. Essential hypertension  Chronic stable condition.  Continue with chlorthalidone and losartan.    6. Hypothyroidism, unspecified type  - TSH; Future  Lab test requested.  Advised to continue with levothyroxine.      Recommend follow-up 3 to 4 months                Please note that this dictation was created using voice recognition software. I have made every reasonable attempt to correct obvious errors, but I expect that there are errors of grammar and possibly content that I did not discover before finalizing the note.    Terell Acuna MD  Internal Medicine  [facility] care [facility]

## 2022-11-01 NOTE — ASSESSMENT & PLAN NOTE
Chart review show patient had blood test done ordered by nephrologist recently.  The sugar level noted to be high.  The patient stated that this was a nonfasting blood test.  I recommend a fasting blood sugar and A1c to be done for follow-up.

## 2022-11-01 NOTE — ASSESSMENT & PLAN NOTE
This is a new condition noted with recent blood tests ordered by nephrologist.  Potassium level 3.3.  The patient is asymptomatic.  Patient stated that she has not been taking potassium chloride on a consistent basis.  Advised the patient to start potassium chloride 20 M EQ daily.  I have ordered to repeat the potassium blood test in about 7 days.

## 2022-11-01 NOTE — ASSESSMENT & PLAN NOTE
Chronic condition.  The patient is taking levothyroxine.  Patient is due for lab test.  TSH ordered today.

## 2022-11-01 NOTE — ASSESSMENT & PLAN NOTE
This is a new condition.  The patient reported having issue with short-term memory since approximately the last 6 months.  Patient stated that she has difficulty with direction and relying more on the GPS.  She also reporting having short-term memory issues especially with conversation being discussed with friends and family members.  The patient denies any headaches.  No prior history of a stroke.  Patient reported that her brother was diagnosed with dementia.

## 2022-11-04 ENCOUNTER — APPOINTMENT (OUTPATIENT)
Dept: MEDICAL GROUP | Facility: PHYSICIAN GROUP | Age: 82
End: 2022-11-04
Payer: MEDICARE

## 2022-11-07 ENCOUNTER — PATIENT MESSAGE (OUTPATIENT)
Dept: HEALTH INFORMATION MANAGEMENT | Facility: OTHER | Age: 82
End: 2022-11-07

## 2022-11-10 ENCOUNTER — OFFICE VISIT (OUTPATIENT)
Dept: NEPHROLOGY | Facility: MEDICAL CENTER | Age: 82
End: 2022-11-10
Payer: MEDICARE

## 2022-11-10 VITALS
OXYGEN SATURATION: 98 % | TEMPERATURE: 97.3 F | BODY MASS INDEX: 21.3 KG/M2 | DIASTOLIC BLOOD PRESSURE: 60 MMHG | RESPIRATION RATE: 16 BRPM | SYSTOLIC BLOOD PRESSURE: 118 MMHG | HEART RATE: 72 BPM | HEIGHT: 61 IN | WEIGHT: 112.8 LBS

## 2022-11-10 DIAGNOSIS — E21.1 HYPERPARATHYROIDISM DUE TO VITAMIN D DEFICIENCY (HCC): ICD-10-CM

## 2022-11-10 DIAGNOSIS — I10 ESSENTIAL HYPERTENSION: ICD-10-CM

## 2022-11-10 DIAGNOSIS — M10.9 GOUT OF BOTH FEET: ICD-10-CM

## 2022-11-10 DIAGNOSIS — N18.31 STAGE 3A CHRONIC KIDNEY DISEASE: ICD-10-CM

## 2022-11-10 DIAGNOSIS — D64.9 ANEMIA, UNSPECIFIED TYPE: ICD-10-CM

## 2022-11-10 DIAGNOSIS — R80.9 MICROALBUMINURIA: ICD-10-CM

## 2022-11-10 PROCEDURE — 99214 OFFICE O/P EST MOD 30 MIN: CPT | Performed by: INTERNAL MEDICINE

## 2022-11-10 ASSESSMENT — ENCOUNTER SYMPTOMS
EYES NEGATIVE: 1
ORTHOPNEA: 0
SENSORY CHANGE: 0
CHILLS: 0
SPEECH CHANGE: 0
VOMITING: 0
HEMOPTYSIS: 0
WEIGHT LOSS: 1
FLANK PAIN: 0
COUGH: 0
SINUS PAIN: 0
ABDOMINAL PAIN: 0
WHEEZING: 0
HYPERTENSION: 1
HEADACHES: 0
NAUSEA: 0
FEVER: 0
SHORTNESS OF BREATH: 0
MEMORY LOSS: 1
FOCAL WEAKNESS: 0
PALPITATIONS: 0
DIZZINESS: 0

## 2022-11-10 NOTE — PATIENT INSTRUCTIONS
Stop vit D  KCL 2 tablets daily, MgO2 2 tablets daily  Keep well hydrated  Low salt diet  Monitor BP

## 2022-11-10 NOTE — PROGRESS NOTES
Subjective:      Danielle Jon is a 82 y.o. female who presents with Follow-Up (CKD)            Chronic Kidney Disease  Pertinent negatives include no abdominal pain, chest pain, chills, congestion, coughing, fever, headaches, nausea or vomiting.   Hypertension  Pertinent negatives include no chest pain, headaches, malaise/fatigue, orthopnea, palpitations or shortness of breath.   Danielle is coming today for HTN, CKD IIIa f/u  C/o memory disturbances -scheduled CT head, Neurology evaluation  No difficulties to urinate  No dysuria/hematuria/flank pain  HTN: elevated BP improved  CKD IIIa - creat level stable at 1.0-1.1  vit D level well controlled  Elevated PTH  - improved -parathyroid  nuclear test -negative for parathyroid adenoma.    Review of Systems   Constitutional:  Positive for weight loss. Negative for chills, fever and malaise/fatigue.   HENT:  Negative for congestion, hearing loss and sinus pain.    Eyes: Negative.    Respiratory:  Negative for cough, hemoptysis, shortness of breath and wheezing.    Cardiovascular:  Negative for chest pain, palpitations, orthopnea and leg swelling.   Gastrointestinal:  Negative for abdominal pain, nausea and vomiting.   Genitourinary:  Negative for dysuria, flank pain, frequency, hematuria and urgency.   Skin: Negative.    Neurological:  Negative for dizziness, sensory change, speech change, focal weakness and headaches.   Psychiatric/Behavioral:  Positive for memory loss.    All other systems reviewed and are negative.  Past Medical History:   Diagnosis Date    Cataract     CKD (chronic kidney disease), stage III (HCC) 11/29/2017    Essential hypertension 2/19/2016    Gout of both feet 6/6/2017    Hypercalcemia 12/14/2017    Hypertriglyceridemia 4/4/2017    Hypothyroidism 2/19/2016    Irregular heart beat 10/22/2019    Stroke (HCC)     TIA at age 33       Family History   Problem Relation Age of Onset    Hypertension Mother     Heart Disease Mother         chf    Lung  "Disease Father     Hypertension Sister     Hyperlipidemia Sister     Hypertension Brother        Social History     Socioeconomic History    Marital status:    Tobacco Use    Smoking status: Never    Smokeless tobacco: Never   Vaping Use    Vaping Use: Never used   Substance and Sexual Activity    Alcohol use: Yes     Alcohol/week: 8.4 oz     Types: 14 Glasses of wine per week     Comment: 2 glasses of wine daily    Drug use: No    Sexual activity: Yes     Partners: Male   Social History Narrative    Retired from [employer].  Lives with her .  Having some hearing concerns.  Walking without walking assistance, mentally and physically.  Independent. No social or domestic concerns.     Social Determinants of Health     Financial Resource Strain: Low Risk     Difficulty of Paying Living Expenses: Not hard at all   Food Insecurity: No Food Insecurity    Worried About Running Out of Food in the Last Year: Never true    Ran Out of Food in the Last Year: Never true   Transportation Needs: No Transportation Needs    Lack of Transportation (Medical): No    Lack of Transportation (Non-Medical): No   Physical Activity: Insufficiently Active    Days of Exercise per Week: 5 days    Minutes of Exercise per Session: 20 min   Stress: No Stress Concern Present    Feeling of Stress : Only a little   Social Connections: Moderately Isolated    Frequency of Communication with Friends and Family: Twice a week    Frequency of Social Gatherings with Friends and Family: Once a week    Attends Yarsanism Services: Never    Active Member of Clubs or Organizations: No    Marital Status:    Housing Stability: Unknown    Unable to Pay for Housing in the Last Year: No    Number of Places Lived in the Last Year: 1          Objective:     /60 (BP Location: Left arm, Patient Position: Sitting, BP Cuff Size: Adult)   Pulse 72   Temp 36.3 °C (97.3 °F) (Temporal)   Resp 16   Ht 1.549 m (5' 1\")   Wt 51.2 kg (112 lb 12.8 oz)  "  SpO2 98%   BMI 21.31 kg/m²      Physical Exam  Vitals reviewed.   Constitutional:       General: She is not in acute distress.     Appearance: Normal appearance. She is well-developed. She is not diaphoretic.   HENT:      Head: Normocephalic and atraumatic.      Nose: Nose normal.      Mouth/Throat:      Mouth: Mucous membranes are moist.      Pharynx: Oropharynx is clear.   Eyes:      Extraocular Movements: Extraocular movements intact.      Conjunctiva/sclera: Conjunctivae normal.      Pupils: Pupils are equal, round, and reactive to light.   Cardiovascular:      Rate and Rhythm: Normal rate and regular rhythm.      Pulses: Normal pulses.      Heart sounds: Normal heart sounds.   Pulmonary:      Effort: Pulmonary effort is normal. No respiratory distress.      Breath sounds: Normal breath sounds. No wheezing or rales.   Abdominal:      General: Bowel sounds are normal. There is no distension.      Palpations: Abdomen is soft. There is no mass.      Tenderness: There is no abdominal tenderness. There is no right CVA tenderness or left CVA tenderness.   Musculoskeletal:      Cervical back: Normal range of motion and neck supple.      Right lower leg: No edema.      Left lower leg: No edema.   Skin:     General: Skin is warm.      Coloration: Skin is not pale.      Findings: No erythema or rash.   Neurological:      General: No focal deficit present.      Mental Status: She is alert and oriented to person, place, and time.      Cranial Nerves: No cranial nerve deficit.      Coordination: Coordination normal.   Psychiatric:         Mood and Affect: Mood normal.         Behavior: Behavior normal.         Thought Content: Thought content normal.         Judgment: Judgment normal.             Laboratory results reviewed: d/w Pt  Lab Results   Component Value Date/Time    CREATININE 1.07 10/27/2022 12:09 PM    POTASSIUM 3.3 (L) 10/27/2022 12:09 PM       Assessment/Plan:     1. CKD (chronic kidney disease), stage IIIa       creat level stable at baseline -to monitor      2. Essential hypertension      BP well controlled - continue current treatment    3. Vit D def      vit D level - well controlled      elevated PTH -improved      Nuclear scan negative for parathyroid adenoma.    4. Anemia : Hb level WNL    5. Gout; uric acid well controlled with allopurinol      To monitor    6. Microalbuminuria mild -controlled with ARB    7. Electrolytes: low K, low Mg -to increase doses of KCl and MgO2    Recs:   Stop vit D  KCL 2 tablets daily, MgO2 2 tablets daily  Keep well hydrated  Low salt diet  Monitor BP  F/u in 3 months

## 2022-11-11 ENCOUNTER — HOSPITAL ENCOUNTER (OUTPATIENT)
Dept: RADIOLOGY | Facility: MEDICAL CENTER | Age: 82
End: 2022-11-11
Attending: INTERNAL MEDICINE
Payer: MEDICARE

## 2022-11-11 DIAGNOSIS — R41.3 MEMORY DEFICIT: ICD-10-CM

## 2022-11-11 PROCEDURE — 70450 CT HEAD/BRAIN W/O DYE: CPT

## 2022-11-20 DIAGNOSIS — M10.9 GOUT OF BOTH FEET: ICD-10-CM

## 2022-11-21 RX ORDER — ALLOPURINOL 100 MG/1
TABLET ORAL
Qty: 90 TABLET | Refills: 0 | Status: SHIPPED | OUTPATIENT
Start: 2022-11-21 | End: 2023-04-17

## 2023-02-03 ENCOUNTER — HOSPITAL ENCOUNTER (OUTPATIENT)
Dept: LAB | Facility: MEDICAL CENTER | Age: 83
End: 2023-02-03
Attending: INTERNAL MEDICINE
Payer: COMMERCIAL

## 2023-02-03 DIAGNOSIS — N18.31 STAGE 3A CHRONIC KIDNEY DISEASE: ICD-10-CM

## 2023-02-03 DIAGNOSIS — I10 ESSENTIAL HYPERTENSION: ICD-10-CM

## 2023-02-03 LAB
ANION GAP SERPL CALC-SCNC: 11 MMOL/L (ref 7–16)
BUN SERPL-MCNC: 20 MG/DL (ref 8–22)
CALCIUM SERPL-MCNC: 10.2 MG/DL (ref 8.5–10.5)
CHLORIDE SERPL-SCNC: 103 MMOL/L (ref 96–112)
CO2 SERPL-SCNC: 27 MMOL/L (ref 20–33)
CREAT SERPL-MCNC: 1.02 MG/DL (ref 0.5–1.4)
FASTING STATUS PATIENT QL REPORTED: NORMAL
GFR SERPLBLD CREATININE-BSD FMLA CKD-EPI: 55 ML/MIN/1.73 M 2
GLUCOSE SERPL-MCNC: 81 MG/DL (ref 65–99)
MAGNESIUM SERPL-MCNC: 1.9 MG/DL (ref 1.5–2.5)
POTASSIUM SERPL-SCNC: 4.3 MMOL/L (ref 3.6–5.5)
SODIUM SERPL-SCNC: 141 MMOL/L (ref 135–145)

## 2023-02-03 PROCEDURE — 36415 COLL VENOUS BLD VENIPUNCTURE: CPT

## 2023-02-03 PROCEDURE — 80048 BASIC METABOLIC PNL TOTAL CA: CPT

## 2023-02-03 PROCEDURE — 83735 ASSAY OF MAGNESIUM: CPT

## 2023-02-09 ENCOUNTER — OFFICE VISIT (OUTPATIENT)
Dept: NEPHROLOGY | Facility: MEDICAL CENTER | Age: 83
End: 2023-02-09
Payer: COMMERCIAL

## 2023-02-09 VITALS
WEIGHT: 109.5 LBS | OXYGEN SATURATION: 97 % | HEIGHT: 61 IN | BODY MASS INDEX: 20.67 KG/M2 | HEART RATE: 74 BPM | SYSTOLIC BLOOD PRESSURE: 120 MMHG | DIASTOLIC BLOOD PRESSURE: 66 MMHG | TEMPERATURE: 97.6 F

## 2023-02-09 DIAGNOSIS — D64.9 ANEMIA, UNSPECIFIED TYPE: ICD-10-CM

## 2023-02-09 DIAGNOSIS — R80.9 MICROALBUMINURIA: ICD-10-CM

## 2023-02-09 DIAGNOSIS — N18.31 STAGE 3A CHRONIC KIDNEY DISEASE: ICD-10-CM

## 2023-02-09 DIAGNOSIS — E21.1 HYPERPARATHYROIDISM DUE TO VITAMIN D DEFICIENCY (HCC): ICD-10-CM

## 2023-02-09 DIAGNOSIS — M10.9 GOUT OF BOTH FEET: ICD-10-CM

## 2023-02-09 DIAGNOSIS — I10 ESSENTIAL HYPERTENSION: ICD-10-CM

## 2023-02-09 PROCEDURE — 99214 OFFICE O/P EST MOD 30 MIN: CPT | Performed by: INTERNAL MEDICINE

## 2023-02-09 ASSESSMENT — ENCOUNTER SYMPTOMS
SINUS PAIN: 0
PALPITATIONS: 0
EYES NEGATIVE: 1
DIARRHEA: 0
WEIGHT LOSS: 0
FEVER: 0
HEMOPTYSIS: 0
COUGH: 0
MEMORY LOSS: 1
ABDOMINAL PAIN: 0
FLANK PAIN: 0
WHEEZING: 0
SHORTNESS OF BREATH: 0
HYPERTENSION: 1
CHILLS: 0
VOMITING: 0
ORTHOPNEA: 0
NAUSEA: 0

## 2023-02-09 NOTE — PROGRESS NOTES
Subjective:      Danielle Jon is a 82 y.o. female who presents with Follow-Up and Chronic Kidney Disease            Chronic Kidney Disease  Pertinent negatives include no abdominal pain, chest pain, chills, congestion, coughing, fever, nausea or vomiting.   Hypertension  Pertinent negatives include no chest pain, malaise/fatigue, orthopnea, palpitations or shortness of breath.   Danielle is coming today for HTN, CKD IIIa f/u  C/o memory disturbances  No difficulties to urinate  No dysuria/hematuria/flank pain  HTN: elevated BP improved  CKD IIIa - creat level stable at 1.0-1.1  vit D level well controlled  Elevated PTH  - improved -parathyroid  nuclear test -negative for parathyroid adenoma.    Review of Systems   Constitutional:  Negative for chills, fever, malaise/fatigue and weight loss.   HENT:  Negative for congestion, hearing loss and sinus pain.    Eyes: Negative.    Respiratory:  Negative for cough, hemoptysis, shortness of breath and wheezing.    Cardiovascular:  Negative for chest pain, palpitations, orthopnea and leg swelling.   Gastrointestinal:  Negative for abdominal pain, diarrhea, nausea and vomiting.   Genitourinary:  Negative for dysuria, flank pain, frequency, hematuria and urgency.   Skin: Negative.    Psychiatric/Behavioral:  Positive for memory loss.    All other systems reviewed and are negative.  Past Medical History:   Diagnosis Date    Cataract     CKD (chronic kidney disease), stage III (HCC) 11/29/2017    Essential hypertension 2/19/2016    Gout of both feet 6/6/2017    Hypercalcemia 12/14/2017    Hypertriglyceridemia 4/4/2017    Hypothyroidism 2/19/2016    Irregular heart beat 10/22/2019    Stroke (HCC)     TIA at age 33       Family History   Problem Relation Age of Onset    Hypertension Mother     Heart Disease Mother         chf    Lung Disease Father     Hypertension Sister     Hyperlipidemia Sister     Hypertension Brother        Social History     Socioeconomic History    Marital  "status:    Tobacco Use    Smoking status: Never    Smokeless tobacco: Never   Vaping Use    Vaping Use: Never used   Substance and Sexual Activity    Alcohol use: Yes     Alcohol/week: 8.4 oz     Types: 14 Glasses of wine per week     Comment: 2 glasses of wine daily    Drug use: No    Sexual activity: Yes     Partners: Male   Social History Narrative    Retired from [employer].  Lives with her .  Having some hearing concerns.  Walking without walking assistance, mentally and physically.  Independent. No social or domestic concerns.     Social Determinants of Health     Financial Resource Strain: Low Risk     Difficulty of Paying Living Expenses: Not hard at all   Food Insecurity: No Food Insecurity    Worried About Running Out of Food in the Last Year: Never true    Ran Out of Food in the Last Year: Never true   Transportation Needs: No Transportation Needs    Lack of Transportation (Medical): No    Lack of Transportation (Non-Medical): No   Physical Activity: Insufficiently Active    Days of Exercise per Week: 5 days    Minutes of Exercise per Session: 20 min   Stress: No Stress Concern Present    Feeling of Stress : Only a little   Social Connections: Moderately Isolated    Frequency of Communication with Friends and Family: Twice a week    Frequency of Social Gatherings with Friends and Family: Once a week    Attends [employer]ology Services: Never    Active Member of Clubs or Organizations: No    Marital Status:    Housing Stability: Unknown    Unable to Pay for Housing in the Last Year: No    Number of Places Lived in the Last Year: 1          Objective:     /66 (BP Location: Right arm, Patient Position: Sitting, BP Cuff Size: Adult)   Pulse 74   Temp 36.4 °C (97.6 °F) (Temporal)   Ht 1.549 m (5' 1\")   Wt 49.7 kg (109 lb 8 oz)   SpO2 97%   BMI 20.69 kg/m²      Physical Exam  Vitals reviewed.   Constitutional:       General: She is not in acute distress.     Appearance: Normal " appearance. She is well-developed. She is not diaphoretic.   HENT:      Head: Normocephalic and atraumatic.      Nose: Nose normal.      Mouth/Throat:      Mouth: Mucous membranes are moist.      Pharynx: Oropharynx is clear.   Eyes:      Extraocular Movements: Extraocular movements intact.      Conjunctiva/sclera: Conjunctivae normal.      Pupils: Pupils are equal, round, and reactive to light.   Cardiovascular:      Rate and Rhythm: Normal rate and regular rhythm.      Pulses: Normal pulses.      Heart sounds: Normal heart sounds.   Pulmonary:      Effort: Pulmonary effort is normal. No respiratory distress.      Breath sounds: Normal breath sounds. No wheezing or rales.   Abdominal:      General: Bowel sounds are normal. There is no distension.      Palpations: Abdomen is soft. There is no mass.      Tenderness: There is no abdominal tenderness. There is no right CVA tenderness or left CVA tenderness.   Musculoskeletal:      Cervical back: Normal range of motion and neck supple.      Right lower leg: No edema.      Left lower leg: No edema.   Skin:     General: Skin is warm.      Coloration: Skin is not pale.      Findings: No erythema or rash.   Neurological:      General: No focal deficit present.      Mental Status: She is alert and oriented to person, place, and time.      Cranial Nerves: No cranial nerve deficit.      Coordination: Coordination normal.   Psychiatric:         Mood and Affect: Mood normal.         Behavior: Behavior normal.         Thought Content: Thought content normal.         Judgment: Judgment normal.             Laboratory results reviewed: d/w Pt  Lab Results   Component Value Date/Time    CREATININE 1.02 02/03/2023 12:44 PM    POTASSIUM 4.3 02/03/2023 12:44 PM       Assessment/Plan:     1. CKD (chronic kidney disease), stage IIIa      creat level stable at baseline -to monitor      2. Essential hypertension      BP well controlled - continue current treatment    3. Vit D def      vit D  level - well controlled      elevated PTH -improved      Nuclear scan negative for parathyroid adenoma.    4. Anemia : Hb level WNL    5. Gout; uric acid well controlled with allopurinol      To monitor    6. Microalbuminuria mild -controlled with ARB    7. Electrolytes: low K, low Mg - improved -well controlled    Recs:   Continue current treatment  Keep well hydrated  Low salt diet  Monitor BP  F/u in 6 months

## 2023-04-17 DIAGNOSIS — M10.9 GOUT OF BOTH FEET: ICD-10-CM

## 2023-04-17 DIAGNOSIS — I10 ESSENTIAL HYPERTENSION: ICD-10-CM

## 2023-04-17 RX ORDER — ALLOPURINOL 100 MG/1
TABLET ORAL
Qty: 90 TABLET | Refills: 0 | Status: SHIPPED | OUTPATIENT
Start: 2023-04-17 | End: 2023-09-05

## 2023-04-17 RX ORDER — LOSARTAN POTASSIUM 100 MG/1
TABLET ORAL
Qty: 90 TABLET | Refills: 3 | Status: SHIPPED
Start: 2023-04-17 | End: 2023-05-25

## 2023-04-26 ENCOUNTER — APPOINTMENT (OUTPATIENT)
Dept: RADIOLOGY | Facility: MEDICAL CENTER | Age: 83
End: 2023-04-26
Payer: COMMERCIAL

## 2023-05-23 ENCOUNTER — TELEPHONE (OUTPATIENT)
Dept: CARDIOLOGY | Facility: MEDICAL CENTER | Age: 83
End: 2023-05-23
Payer: COMMERCIAL

## 2023-05-23 DIAGNOSIS — I10 ESSENTIAL HYPERTENSION: ICD-10-CM

## 2023-05-23 NOTE — TELEPHONE ENCOUNTER
VR    Caller: Tita    Name and Department:     [facility]    Topic/Issue: PEPE Rivers states that patient was recently at [facility] where they started her on AMLODIPINE and discontinued the following medications:     CHLORTHALIDONE  LOSARTAN    Please be advised.    Thank you,  Nikita S.    Callback Number or Extension: No call back needed.

## 2023-05-24 ENCOUNTER — TELEPHONE (OUTPATIENT)
Dept: MEDICAL GROUP | Facility: PHYSICIAN GROUP | Age: 83
End: 2023-05-24
Payer: COMMERCIAL

## 2023-05-24 NOTE — TELEPHONE ENCOUNTER
VOICEMAIL  1. Caller Name: Jeff/PT                      Call Back Number: [phone]    2. Message: Jeff/PT is asking for verbal order for pt to get physical therapy 2 times next week and then 1 time following 2 weeks.    3. Patient approves office to leave a detailed voicemail/MyChart message: N\A

## 2023-05-25 RX ORDER — AMLODIPINE BESYLATE 2.5 MG/1
2.5 TABLET ORAL DAILY
Qty: 30 TABLET | Refills: 0 | COMMUNITY
Start: 2023-05-25 | End: 2023-06-29

## 2023-05-25 NOTE — TELEPHONE ENCOUNTER
Called pt [phone]  S/w  Jerald. Changes verified. Amlodipine 2.5 mg QD is new medication. Thanked Jerald for his time and advise that MAR will be updated to reflect changes. Jerald verbalized understanding.        FYI to VR   MAR updated       amLODIPine (NORVASC) 2.5 MG Tab 30 Tablet 0/0 5/25/2023     Sig - Route: Take 1 Tablet by mouth every day. - Oral    Class: Historical Med    Notes to Pharmacy: Pt reported see tele encounter dated 5/25/23

## 2023-05-26 NOTE — PROGRESS NOTES
SUBJECTIVE      Chief Complaint   Patient presents with   • Wound Check     R lower leg, inflamed               Rash  This is a new problem. The problem has been gradually worsening since onset. Pain location: rt leg.  The rash is characterized by redness, swelling and pain, worse to the touch. Pt was exposed to nothing. Pertinent negatives include no congestion, cough, fatigue, fever or shortness of breath. Past treatments include bactrim - little improvement.     History   Substance Use Topics   • Smoking status: Never Smoker    • Smokeless tobacco: No    • Alcohol Use: No      Past medical history was unremarkable and not pertinent to current issue      Family History   Problem Relation Age of Onset   • Hypertension Mother    • Heart Disease Mother      chf   • Lung Disease Father    • Hypertension Sister    • Hyperlipidemia Sister    • Hypertension Brother          Review of Systems   Constitutional: Negative for fever and fatigue.   HENT: Negative for congestion.    Respiratory: Negative for cough and shortness of breath.    Cardiovascular: Negative for chest pain.   Gastrointestinal: Negative for abdominal pain.   Skin: Positive for rash.   Neurological: Negative for dizziness.   All other systems reviewed and are negative.         Objective:     Blood pressure 112/64, pulse 54, temperature 37.3 °C (99.1 °F), resp. rate 14, weight 57.607 kg (127 lb), SpO2 98 %.      Physical Exam   Constitutional: pt is oriented to person, place, and time. Pt appears well-developed and well-nourished. No distress.   HENT:   Head: Normocephalic and atraumatic.   Eyes: Conjunctivae are normal. No scleral icterus.   Cardiovascular: Normal rate and regular rhythm.    Pulmonary/Chest: Effort normal and breath sounds normal. No respiratory distress.        Neurological: pt is alert and oriented to person, place, and time. No cranial nerve deficit.   Skin: Skin is warm. Pt is not diaphoretic.      There is a scabbed-over abrasion  with some surrounding area of erythema, warmth, tender to touch over rt shin, just distal to knee.         Nursing note and vitals reviewed.              Assessment/Plan:     1. Cellulitis of right lower extremity     Continue bactrim, will add amoxicillin      - amoxicillin (AMOXIL) 500 MG Cap; Take 1 Cap by mouth 3 times a day for 5 days.  Dispense: 15 Cap; Refill: 0  - CULTURE WOUND W/ GRAM STAIN    Follow up in one week if no improvement, sooner if symptoms worsen.      78

## 2023-06-01 ENCOUNTER — OFFICE VISIT (OUTPATIENT)
Dept: MEDICAL GROUP | Facility: PHYSICIAN GROUP | Age: 83
End: 2023-06-01
Payer: COMMERCIAL

## 2023-06-01 ENCOUNTER — HOSPITAL ENCOUNTER (OUTPATIENT)
Dept: LAB | Facility: MEDICAL CENTER | Age: 83
End: 2023-06-01
Payer: COMMERCIAL

## 2023-06-01 VITALS
HEIGHT: 61 IN | RESPIRATION RATE: 16 BRPM | DIASTOLIC BLOOD PRESSURE: 72 MMHG | OXYGEN SATURATION: 98 % | HEART RATE: 61 BPM | TEMPERATURE: 98.6 F | WEIGHT: 100 LBS | SYSTOLIC BLOOD PRESSURE: 138 MMHG | BODY MASS INDEX: 18.88 KG/M2

## 2023-06-01 DIAGNOSIS — Z09 HOSPITAL DISCHARGE FOLLOW-UP: ICD-10-CM

## 2023-06-01 DIAGNOSIS — I10 ESSENTIAL HYPERTENSION: Chronic | ICD-10-CM

## 2023-06-01 DIAGNOSIS — E87.6 HYPOKALEMIA: ICD-10-CM

## 2023-06-01 LAB
ERYTHROCYTE [DISTWIDTH] IN BLOOD BY AUTOMATED COUNT: 49.2 FL (ref 35.9–50)
HCT VFR BLD AUTO: 35.9 % (ref 37–47)
HGB BLD-MCNC: 12.3 G/DL (ref 12–16)
MCH RBC QN AUTO: 35.2 PG (ref 27–33)
MCHC RBC AUTO-ENTMCNC: 34.3 G/DL (ref 32.2–35.5)
MCV RBC AUTO: 102.9 FL (ref 81.4–97.8)
PLATELET # BLD AUTO: 235 K/UL (ref 164–446)
PMV BLD AUTO: 10.5 FL (ref 9–12.9)
RBC # BLD AUTO: 3.49 M/UL (ref 4.2–5.4)
WBC # BLD AUTO: 6.6 K/UL (ref 4.8–10.8)

## 2023-06-01 PROCEDURE — 80053 COMPREHEN METABOLIC PANEL: CPT

## 2023-06-01 PROCEDURE — 99214 OFFICE O/P EST MOD 30 MIN: CPT

## 2023-06-01 PROCEDURE — 85027 COMPLETE CBC AUTOMATED: CPT

## 2023-06-01 PROCEDURE — 36415 COLL VENOUS BLD VENIPUNCTURE: CPT

## 2023-06-01 PROCEDURE — 3075F SYST BP GE 130 - 139MM HG: CPT

## 2023-06-01 PROCEDURE — 3078F DIAST BP <80 MM HG: CPT

## 2023-06-01 ASSESSMENT — PATIENT HEALTH QUESTIONNAIRE - PHQ9: CLINICAL INTERPRETATION OF PHQ2 SCORE: 0

## 2023-06-01 NOTE — ASSESSMENT & PLAN NOTE
Patient presents for hospital follow-up.  She presented to [facility] on May 22 for dizziness with a fall.  She was found to have a urinary tract infection complicated by acute kidney injury that appears to have resolved with fluid correction.  She completed a course of ceftriaxone.  She was found to have somewhat severe protein calorie malnutrition as well.  Home health was ordered on discharge.     Patient reports that she is still getting some home health and physical therapy done, as well as a nurse. She reports that since being home she has felt well,  notes that she seems to be improving daily. Her weakness and fatigue has improved.     Patient's  reports that prior to hospitalization she was not eating well or drinking well. Patient reports that she is eating eggs or a bagel and fruit in the morning, lunch time is usually ensure and a light snack, and then a small meal at nighttime.     Patient reports that she continues to take a potassium supplement 20mg BID. She did have labs resulted which show some persistent hypokalemia.    Weight today was 100lbs, previously  reports she was around 94-96 lbs.

## 2023-06-01 NOTE — PROGRESS NOTES
Subjective:     Danielle Jon is a 82 y.o. female who presents for Hospital Follow-up.    HPI:   Recently hospitalized for UTI with [facility]     Hospital discharge follow-up  Patient presents for hospital follow-up.  She presented to [facility] on May 22 for dizziness with a fall.  She was found to have a urinary tract infection complicated by acute kidney injury that appears to have resolved with fluid correction.  She completed a course of ceftriaxone.  She was found to have somewhat severe protein calorie malnutrition as well.  Home health was ordered on discharge.     Patient reports that she is still getting some home health and physical therapy done, as well as a nurse. She reports that since being home she has felt well,  notes that she seems to be improving daily. Her weakness and fatigue has improved.     Patient's  reports that prior to hospitalization she was not eating well or drinking well. Patient reports that she is eating eggs or a bagel and fruit in the morning, lunch time is usually ensure and a light snack, and then a small meal at nighttime.     Patient reports that she continues to take a potassium supplement 20mg BID. She did have labs resulted which show some persistent hypokalemia.    Weight today was 100lbs, previously  reports she was around 94-96 lbs.        Current medicines (including reconciliation performed today)  Current Outpatient Medications   Medication Sig Dispense Refill    amLODIPine (NORVASC) 2.5 MG Tab Take 1 Tablet by mouth every day. 30 Tablet 0    allopurinol (ZYLOPRIM) 100 MG Tab TAKE ONE TABLET BY MOUTH ONCE DAILY 90 Tablet 0    potassium chloride SA (KDUR) 20 MEQ Tab CR TAKE ONE TABLET BY MOUTH TWO TIMES A  Tablet 1    levothyroxine (SYNTHROID) 50 MCG Tab Take 1 Tablet by mouth every morning on an empty stomach. 90 Tablet 3    magnesium oxide (MAG-OX) 400 MG Tab Take 1 Tablet by mouth every day.      cyanocobalamin  "(VITAMIN B-12) 500 MCG Tab Take 1 Tablet by mouth every day.      aspirin 81 MG tablet Take 1 Tablet by mouth every day.       No current facility-administered medications for this visit.       Allergies:   Niacin    Social History     Tobacco Use    Smoking status: Never    Smokeless tobacco: Never   Vaping Use    Vaping Use: Never used   Substance Use Topics    Alcohol use: Yes     Alcohol/week: 8.4 oz     Types: 14 Glasses of wine per week     Comment: 2 glasses of wine daily    Drug use: No       ROS:  Otherwise negative    Objective:     Vitals:    06/01/23 1324   BP: 138/72   Pulse: 61   Resp: 16   Temp: 37 °C (98.6 °F)   TempSrc: Temporal   SpO2: 98%   Weight: 45.4 kg (100 lb)   Height: 1.549 m (5' 1\")     Body mass index is 18.89 kg/m².    Physical Exam:  Physical Exam  Constitutional:       Appearance: Normal appearance.   Eyes:      Extraocular Movements: Extraocular movements intact.   Pulmonary:      Effort: Pulmonary effort is normal.   Musculoskeletal:      Cervical back: Normal range of motion.   Neurological:      General: No focal deficit present.      Mental Status: She is alert and oriented to person, place, and time.   Psychiatric:         Mood and Affect: Mood normal.         Behavior: Behavior normal.       Assessment and Plan:   1. Hospital discharge follow-up  Stable. No signs/symptoms of recurrent infection, educated on s/sys of recurrent uti including fatigue and lethargy and need for urgent urine eval, verbalized understanding. Continue medications as above. Continue with home health, follow up in 4 weeks with PCP    2. Hypokalemia  Chronic. Continue potassium 20 mEq twice daily.  Recheck CMP to reevaluate acute kidney injury as well as electrolytes and albumin.  - Comp Metabolic Panel; Future    3. Essential hypertension  Chronic, stable.  Recheck CBC, continue amlodipine 2.5 mg daily.  Checking blood pressure daily at home, no low readings.  - CBC WITHOUT DIFFERENTIAL; Future      - " Chart and discharge summary were reviewed.   - Hospitalization and results reviewed with patient.   - Medications reviewed including instructions regarding high risk medications, dosing and side effects.  - Recommended Services: Referral to Home Health - Continue services   - Advance directive/POLST on file?  No     Follow-up:Return in about 4 weeks (around 6/29/2023) for pcp.    Face-to-face transitional care management services with [facility] (today's visit is within 14 days post discharge & LACE+ score of 28-58) medical decision complexity were provided.

## 2023-06-02 LAB
ALBUMIN SERPL BCP-MCNC: 3.9 G/DL (ref 3.2–4.9)
ALBUMIN/GLOB SERPL: 1.7 G/DL
ALP SERPL-CCNC: 46 U/L (ref 30–99)
ALT SERPL-CCNC: 18 U/L (ref 2–50)
ANION GAP SERPL CALC-SCNC: 12 MMOL/L (ref 7–16)
AST SERPL-CCNC: 17 U/L (ref 12–45)
BILIRUB SERPL-MCNC: 0.4 MG/DL (ref 0.1–1.5)
BUN SERPL-MCNC: 20 MG/DL (ref 8–22)
CALCIUM ALBUM COR SERPL-MCNC: 9.6 MG/DL (ref 8.5–10.5)
CALCIUM SERPL-MCNC: 9.5 MG/DL (ref 8.5–10.5)
CHLORIDE SERPL-SCNC: 108 MMOL/L (ref 96–112)
CO2 SERPL-SCNC: 24 MMOL/L (ref 20–33)
CREAT SERPL-MCNC: 0.77 MG/DL (ref 0.5–1.4)
FASTING STATUS PATIENT QL REPORTED: NORMAL
GFR SERPLBLD CREATININE-BSD FMLA CKD-EPI: 77 ML/MIN/1.73 M 2
GLOBULIN SER CALC-MCNC: 2.3 G/DL (ref 1.9–3.5)
GLUCOSE SERPL-MCNC: 86 MG/DL (ref 65–99)
POTASSIUM SERPL-SCNC: 4.5 MMOL/L (ref 3.6–5.5)
PROT SERPL-MCNC: 6.2 G/DL (ref 6–8.2)
SODIUM SERPL-SCNC: 144 MMOL/L (ref 135–145)

## 2023-06-22 ENCOUNTER — TELEPHONE (OUTPATIENT)
Dept: MEDICAL GROUP | Facility: PHYSICIAN GROUP | Age: 83
End: 2023-06-22
Payer: COMMERCIAL

## 2023-06-22 ENCOUNTER — HOSPITAL ENCOUNTER (OUTPATIENT)
Dept: LAB | Facility: MEDICAL CENTER | Age: 83
End: 2023-06-22
Attending: INTERNAL MEDICINE
Payer: COMMERCIAL

## 2023-06-22 DIAGNOSIS — Z11.1 SCREENING-PULMONARY TB: ICD-10-CM

## 2023-06-22 PROCEDURE — 36415 COLL VENOUS BLD VENIPUNCTURE: CPT

## 2023-06-22 PROCEDURE — 86480 TB TEST CELL IMMUN MEASURE: CPT

## 2023-06-22 NOTE — TELEPHONE ENCOUNTER
Caller Name: Sanaz Conte   Call Back Number: [phone]    How would the patient prefer to be contacted with a response: Phone call OK to leave a detailed message    Patient is being placed in [facility] and they are requesting a quatiferon for Yanet to get done.    Can you please advise.    Thank you,  Cornelia Looney  Medical Assistant

## 2023-06-22 NOTE — TELEPHONE ENCOUNTER
Phone Number Called: [phone] (home)       Call outcome: Spoke to patient regarding message below.

## 2023-06-23 LAB
GAMMA INTERFERON BACKGROUND BLD IA-ACNC: 0.03 IU/ML
M TB IFN-G BLD-IMP: NEGATIVE
M TB IFN-G CD4+ BCKGRND COR BLD-ACNC: -0.01 IU/ML
MITOGEN IGNF BCKGRD COR BLD-ACNC: 7.13 IU/ML
QFT TB2 - NIL TBQ2: -0.01 IU/ML

## 2023-07-11 ENCOUNTER — OFFICE VISIT (OUTPATIENT)
Dept: MEDICAL GROUP | Facility: PHYSICIAN GROUP | Age: 83
End: 2023-07-11
Payer: COMMERCIAL

## 2023-07-11 VITALS
HEART RATE: 66 BPM | BODY MASS INDEX: 19.71 KG/M2 | HEIGHT: 61 IN | TEMPERATURE: 99.3 F | RESPIRATION RATE: 20 BRPM | SYSTOLIC BLOOD PRESSURE: 124 MMHG | DIASTOLIC BLOOD PRESSURE: 80 MMHG | WEIGHT: 104.38 LBS | OXYGEN SATURATION: 98 %

## 2023-07-11 DIAGNOSIS — Z02.9 ADMINISTRATIVE ENCOUNTER: ICD-10-CM

## 2023-07-11 DIAGNOSIS — G45.9 TIA (TRANSIENT ISCHEMIC ATTACK): Chronic | ICD-10-CM

## 2023-07-11 DIAGNOSIS — I10 ESSENTIAL HYPERTENSION: Chronic | ICD-10-CM

## 2023-07-11 DIAGNOSIS — E03.9 HYPOTHYROIDISM, UNSPECIFIED TYPE: Chronic | ICD-10-CM

## 2023-07-11 DIAGNOSIS — E78.5 DYSLIPIDEMIA: Chronic | ICD-10-CM

## 2023-07-11 DIAGNOSIS — M10.9 GOUT OF BOTH FEET: Chronic | ICD-10-CM

## 2023-07-11 DIAGNOSIS — N18.31 STAGE 3A CHRONIC KIDNEY DISEASE: ICD-10-CM

## 2023-07-11 PROCEDURE — 3079F DIAST BP 80-89 MM HG: CPT | Performed by: INTERNAL MEDICINE

## 2023-07-11 PROCEDURE — 3074F SYST BP LT 130 MM HG: CPT | Performed by: INTERNAL MEDICINE

## 2023-07-11 PROCEDURE — 99214 OFFICE O/P EST MOD 30 MIN: CPT | Performed by: INTERNAL MEDICINE

## 2023-07-11 RX ORDER — CEFDINIR 300 MG/1
300 CAPSULE ORAL 2 TIMES DAILY
Qty: 20 CAPSULE | Refills: 0 | Status: SHIPPED | OUTPATIENT
Start: 2023-07-11 | End: 2023-07-11

## 2023-07-11 ASSESSMENT — FIBROSIS 4 INDEX: FIB4 SCORE: 1.38

## 2023-07-11 NOTE — ASSESSMENT & PLAN NOTE
Chronic condition.  The patient is taking allopurinol 100 mg daily.  The patient denies gout flareup.

## 2023-07-11 NOTE — ASSESSMENT & PLAN NOTE
Chronic condition.  Patient currently taking aspirin 81 mg daily.  Denies any history of bleeding.

## 2023-07-11 NOTE — ASSESSMENT & PLAN NOTE
Done today accompanied by her daughter requesting form to be completed.  The patient will be living at [facility].    Patient already completed QuantiFERON TB screening test June 2023 which was negative.

## 2023-07-11 NOTE — PROGRESS NOTES
PRIMARY CARE CLINIC VISIT    Chief complaint:    Complete form for assisted living  Follow-up CKD 3   follow-up hypertension  Hypothyroidism  Gout  Hyperlipidemia      History of Present Illness     Administrative encounter  Done today accompanied by her daughter requesting form to be completed.  The patient will be living at [facility].    Patient already completed QuantiFERON TB screening test June 2023 which was negative.    CKD (chronic kidney disease), stage III (HCC)  Chronic condition.  The patient followed by nephrology service.  Previous GFR in the 40s to 50s.    Essential hypertension  This is a chronic condition.  The patient currently taking amlodipine 2.5 mg daily.  Patient tolerating medication well.    Hypothyroidism  Chronic condition.  Patient taking levothyroxine 50 mcg daily.  Patient is due for lab test which has been ordered today.    Gout of both feet  Chronic condition.  The patient is taking allopurinol 100 mg daily.  The patient denies gout flareup.    Dyslipidemia  This is a chronic condition.  The patient is currently on diet therapy.  Blood test requested for follow-up.    TIA (transient ischemic attack)  Chronic condition.  Patient currently taking aspirin 81 mg daily.  Denies any history of bleeding.    Current Outpatient Medications on File Prior to Visit   Medication Sig Dispense Refill    amLODIPine (NORVASC) 2.5 MG Tab TAKE ONE TABLET BY MOUTH EVERY DAY 90 Tablet 0    allopurinol (ZYLOPRIM) 100 MG Tab TAKE ONE TABLET BY MOUTH ONCE DAILY 90 Tablet 0    potassium chloride SA (KDUR) 20 MEQ Tab CR TAKE ONE TABLET BY MOUTH TWO TIMES A  Tablet 1    levothyroxine (SYNTHROID) 50 MCG Tab Take 1 Tablet by mouth every morning on an empty stomach. 90 Tablet 3    magnesium oxide (MAG-OX) 400 MG Tab Take 1 Tablet by mouth every day.      cyanocobalamin (VITAMIN B-12) 500 MCG Tab Take 1 Tablet by mouth every day.      aspirin 81 MG tablet Take 1 Tablet by mouth every day.        No current facility-administered medications on file prior to visit.        Allergies: Niacin    Current Outpatient Medications Ordered in Epic   Medication Sig Dispense Refill    amLODIPine (NORVASC) 2.5 MG Tab TAKE ONE TABLET BY MOUTH EVERY DAY 90 Tablet 0    allopurinol (ZYLOPRIM) 100 MG Tab TAKE ONE TABLET BY MOUTH ONCE DAILY 90 Tablet 0    potassium chloride SA (KDUR) 20 MEQ Tab CR TAKE ONE TABLET BY MOUTH TWO TIMES A  Tablet 1    levothyroxine (SYNTHROID) 50 MCG Tab Take 1 Tablet by mouth every morning on an empty stomach. 90 Tablet 3    magnesium oxide (MAG-OX) 400 MG Tab Take 1 Tablet by mouth every day.      cyanocobalamin (VITAMIN B-12) 500 MCG Tab Take 1 Tablet by mouth every day.      aspirin 81 MG tablet Take 1 Tablet by mouth every day.       No current [facility]-ordered facility-administered medications on file.       Past Medical History:   Diagnosis Date    Cataract     CKD (chronic kidney disease), stage III (HCC) 11/29/2017    Essential hypertension 2/19/2016    Gout of both feet 6/6/2017    Hypercalcemia 12/14/2017    Hypertriglyceridemia 4/4/2017    Hypothyroidism 2/19/2016    Irregular heart beat 10/22/2019    Stroke (HCC)     TIA at age 33       Past Surgical History:   Procedure Laterality Date    CATARACT EXTRACTION WITH IOL Bilateral 2014    EYE SURGERY      lasix     MAMMOPLASTY REDUCTION         Family History   Problem Relation Age of Onset    Hypertension Mother     Heart Disease Mother         chf    Lung Disease Father     Hypertension Sister     Hyperlipidemia Sister     Hypertension Brother        Social History     Tobacco Use   Smoking Status Never   Smokeless Tobacco Never   Vaping Use    Vaping Use: Never used       Social History     Substance and Sexual Activity   Alcohol Use Yes    Alcohol/week: 8.4 oz    Types: 14 Glasses of wine per week    Comment: 2 glasses of wine daily       Review of systems.  As per HPI above. All other systems reviewed and negative.      Past  "Medical, Social, and Family history reviewed and updated in EPIC     Objective     /80 (BP Location: Left arm, Patient Position: Sitting, BP Cuff Size: Adult)   Pulse 66   Temp 37.4 °C (99.3 °F) (Temporal)   Resp 20   Ht 1.549 m (5' 1\")   Wt 47.3 kg (104 lb 6 oz)   SpO2 98%    Body mass index is 19.72 kg/m².    General: alert in no apparent distress.  Cardiovascular: regular rate and rhythm  Pulmonary: lungs : no wheezing   Gastrointestinal: BS present. No obvious mass noted  Nerves II to XII grossly intact      No results found for: HBA1C    Lab Results   Component Value Date/Time    WBC 6.6 06/01/2023 02:03 PM    HEMOGLOBIN 12.3 06/01/2023 02:03 PM    HEMATOCRIT 35.9 (L) 06/01/2023 02:03 PM    .9 (H) 06/01/2023 02:03 PM    PLATELETCT 235 06/01/2023 02:03 PM         Lab Results   Component Value Date/Time    SODIUM 144 06/01/2023 02:03 PM    POTASSIUM 4.5 06/01/2023 02:03 PM    GLUCOSE 86 06/01/2023 02:03 PM    BUN 20 06/01/2023 02:03 PM    CREATININE 0.77 06/01/2023 02:03 PM       Lab Results   Component Value Date/Time    CHOLSTRLTOT 195 04/22/2022 09:56 AM    TRIGLYCERIDE 55 04/22/2022 09:56 AM    HDL 78 04/22/2022 09:56 AM     (H) 04/22/2022 09:56 AM       Lab Results   Component Value Date/Time    ALTSGPT 18 06/01/2023 02:03 PM             Assessment and Plan     1. Administrative encounter  Form has been completed for the patient today.    2. Stage 3a chronic kidney disease (HCC)  Chronic condition.  Recent blood test done June 1, 2023 show improvement in GFR 77.  Continue to monitor.    3. Essential hypertension  Chronic condition.  Continue amlodipine 2.5 Mg daily.    4. Hypothyroidism, unspecified type  - TSH; Future  Chronic condition.  Current status unclear.  Lab test requested for follow-up.  Patient will continue to take levothyroxine 50 mcg daily.    5. Gout of both feet  Chronic condition.  Stable.  Patient asymptomatic.  Continue with allopurinol 100 mg daily.    6. " Dyslipidemia  - Lipid Profile; Future  This is a chronic condition.  Current status unclear.  Lab test requested for follow-up.  Recommend low-fat low-cholesterol diet.    7. TIA (transient ischemic attack)  This is a chronic condition.  The patient currently asymptomatic.  Continue to take aspirin 81 mg daily.                    Please note that this dictation was created using voice recognition software. I have made every reasonable attempt to correct obvious errors, but I expect that there are errors of grammar and possibly content that I did not discover before finalizing the note.    Terell Acuna MD  Internal Medicine  [facility]

## 2023-07-11 NOTE — ASSESSMENT & PLAN NOTE
Chronic condition.  Patient taking levothyroxine 50 mcg daily.  Patient is due for lab test which has been ordered today.

## 2023-07-11 NOTE — ASSESSMENT & PLAN NOTE
This is a chronic condition.  The patient is currently on diet therapy.  Blood test requested for follow-up.

## 2023-07-11 NOTE — ASSESSMENT & PLAN NOTE
This is a chronic condition.  The patient currently taking amlodipine 2.5 mg daily.  Patient tolerating medication well.

## 2023-07-31 DIAGNOSIS — F41.9 ANXIETY: ICD-10-CM

## 2023-07-31 DIAGNOSIS — R45.1 AGITATION: ICD-10-CM

## 2023-08-02 RX ORDER — CHOLECALCIFEROL (VITAMIN D3) 125 MCG
500 CAPSULE ORAL DAILY
Qty: 30 TABLET | Refills: 10 | Status: SHIPPED | OUTPATIENT
Start: 2023-08-02

## 2023-08-15 DIAGNOSIS — E83.42 HYPOMAGNESEMIA: ICD-10-CM

## 2023-09-05 DIAGNOSIS — Z51.81 MEDICATION MONITORING ENCOUNTER: ICD-10-CM

## 2023-09-05 DIAGNOSIS — E87.6 HYPOKALEMIA: ICD-10-CM

## 2023-09-05 RX ORDER — POTASSIUM CHLORIDE 20 MEQ/1
20 TABLET, EXTENDED RELEASE ORAL 2 TIMES DAILY
Qty: 60 TABLET | Refills: 5 | Status: SHIPPED
Start: 2023-09-05 | End: 2023-09-05

## 2023-09-05 RX ORDER — POTASSIUM CHLORIDE 20 MEQ/1
20 TABLET, EXTENDED RELEASE ORAL 2 TIMES DAILY
Qty: 60 TABLET | Refills: 3 | Status: SHIPPED | OUTPATIENT
Start: 2023-09-05

## 2023-09-20 ENCOUNTER — TELEPHONE (OUTPATIENT)
Dept: MEDICAL GROUP | Facility: PHYSICIAN GROUP | Age: 83
End: 2023-09-20
Payer: COMMERCIAL

## 2023-09-20 NOTE — TELEPHONE ENCOUNTER
Caller Name: Marge Son      Call Back Number: [phone]    How would the patient prefer to be contacted with a response: Phone call OK to leave a detailed message    Patient would like to know how to go about with getting a POA sign for his aunt or close friend. Does he Need to be here to cheryl access since patient is having issues at the moment.     Can you please advise.    Thank you,  Cornelia Looney  Medical Assistant

## 2023-09-22 PROBLEM — F03.90 DEMENTIA (HCC): Chronic | Status: ACTIVE | Noted: 2022-11-01

## 2023-09-22 PROBLEM — F03.90 DEMENTIA (HCC): Status: ACTIVE | Noted: 2022-11-01

## 2023-09-24 RX ORDER — BUSPIRONE HYDROCHLORIDE 5 MG/1
5 TABLET ORAL 2 TIMES DAILY
Qty: 60 TABLET | Refills: 3 | Status: SHIPPED
Start: 2023-09-24 | End: 2023-10-18

## 2023-09-25 ENCOUNTER — OFFICE VISIT (OUTPATIENT)
Dept: MEDICAL GROUP | Facility: PHYSICIAN GROUP | Age: 83
End: 2023-09-25
Payer: COMMERCIAL

## 2023-09-25 VITALS
BODY MASS INDEX: 19.28 KG/M2 | HEART RATE: 70 BPM | RESPIRATION RATE: 18 BRPM | WEIGHT: 102.13 LBS | HEIGHT: 61 IN | DIASTOLIC BLOOD PRESSURE: 62 MMHG | OXYGEN SATURATION: 99 % | SYSTOLIC BLOOD PRESSURE: 134 MMHG | TEMPERATURE: 97.2 F

## 2023-09-25 DIAGNOSIS — I10 ESSENTIAL HYPERTENSION: Chronic | ICD-10-CM

## 2023-09-25 DIAGNOSIS — N18.31 STAGE 3A CHRONIC KIDNEY DISEASE: ICD-10-CM

## 2023-09-25 DIAGNOSIS — E78.5 DYSLIPIDEMIA: Chronic | ICD-10-CM

## 2023-09-25 DIAGNOSIS — E03.9 HYPOTHYROIDISM, UNSPECIFIED TYPE: Chronic | ICD-10-CM

## 2023-09-25 DIAGNOSIS — G45.9 TIA (TRANSIENT ISCHEMIC ATTACK): Chronic | ICD-10-CM

## 2023-09-25 DIAGNOSIS — F03.B4 MODERATE DEMENTIA WITH ANXIETY, UNSPECIFIED DEMENTIA TYPE (HCC): Chronic | ICD-10-CM

## 2023-09-25 DIAGNOSIS — Z02.9 ADMINISTRATIVE ENCOUNTER: ICD-10-CM

## 2023-09-25 DIAGNOSIS — M10.9 GOUT OF BOTH FEET: Chronic | ICD-10-CM

## 2023-09-25 DIAGNOSIS — E55.9 VITAMIN D DEFICIENCY: ICD-10-CM

## 2023-09-25 DIAGNOSIS — Z23 NEED FOR VACCINATION: ICD-10-CM

## 2023-09-25 DIAGNOSIS — F41.9 ANXIETY: ICD-10-CM

## 2023-09-25 PROCEDURE — 90662 IIV NO PRSV INCREASED AG IM: CPT | Performed by: INTERNAL MEDICINE

## 2023-09-25 PROCEDURE — G0008 ADMIN INFLUENZA VIRUS VAC: HCPCS | Performed by: INTERNAL MEDICINE

## 2023-09-25 PROCEDURE — 3078F DIAST BP <80 MM HG: CPT | Performed by: INTERNAL MEDICINE

## 2023-09-25 PROCEDURE — 99215 OFFICE O/P EST HI 40 MIN: CPT | Mod: 25 | Performed by: INTERNAL MEDICINE

## 2023-09-25 PROCEDURE — 3075F SYST BP GE 130 - 139MM HG: CPT | Performed by: INTERNAL MEDICINE

## 2023-09-25 PROCEDURE — G2212 PROLONG OUTPT/OFFICE VIS: HCPCS | Performed by: INTERNAL MEDICINE

## 2023-09-25 RX ORDER — ASPIRIN 81 MG/1
81 TABLET ORAL DAILY
Qty: 100 TABLET | Refills: 3 | Status: SHIPPED
Start: 2023-09-25

## 2023-09-25 RX ORDER — CEPHALEXIN 500 MG/1
500 CAPSULE ORAL 3 TIMES DAILY
Qty: 5 CAPSULE | Refills: 0 | Status: SHIPPED | OUTPATIENT
Start: 2023-09-25

## 2023-09-25 ASSESSMENT — FIBROSIS 4 INDEX: FIB4 SCORE: 1.4

## 2023-09-26 NOTE — ASSESSMENT & PLAN NOTE
This is a new condition.  Patient reported that her  recently passed away.  She has not recurrent anxiety not well controlled.  Patient denies SI.

## 2023-09-26 NOTE — ASSESSMENT & PLAN NOTE
Chronic condition for the patient is taking levothyroxine 50 mcg daily  Patient is due for lab test which has been ordered.

## 2023-09-26 NOTE — PROGRESS NOTES
PRIMARY CARE CLINIC VISIT        Chief Complaint   Patient presents with    Paperwork      Request form to be completed regarding dementia condition  Dementia   CKD 3  Anxiety  Hypertension  Hypothyroidism  Gout   hyperlipidemia  Vitamin D deficiency  TIA  Request flu shot        History of Present Illness     Dementia (HCC)  This is a chronic condition.  Patient reported difficulty with short-term memory.  Patient accompanied by family friend Luba Conte  Patient reported that she was seen by neurologist Dr. Reynolds today.  I do not have the record for review.    Patient is presently staying at assisted living the [facility].    Power of  is her son Mr. Riki Silva who is unable to attend the meeting today.  He is in [place]    Patient brought in a medical form to be completed regarding her ability to manage her financial situation    CKD (chronic kidney disease), stage III (HCC)  Chronic condition.  Previous GFR in the 40 to 50s.  Advised the patient to avoid NSAID.  Continue to monitor.  Recent lab test result discussed with the patient.    Anxiety  This is a new condition.  Patient reported that her  recently passed away.  She has not recurrent anxiety not well controlled.  Patient denies SI.    Essential hypertension  Chronic condition.  Patient presently taking amlodipine 2.5 mg daily.  Blood pressure has been well controlled.    Hypothyroidism  Chronic condition for the patient is taking levothyroxine 50 mcg daily  Patient is due for lab test which has been ordered.    Gout of both feet  Chronic condition.  Patient is taking allopurinol 100 mg daily.  Patient denies gout flareup.    Dyslipidemia  Chronic condition.  The patient is currently on diet therapy.  Patient is due for lab test.    TIA (transient ischemic attack)  Chronic condition noted when the patient in her 30s.  Patient is taking aspirin 81 mg daily.  No new symptoms reported.    Need for vaccination  Patient is due  for influenza vaccine today    Current Outpatient Medications on File Prior to Visit   Medication Sig Dispense Refill    busPIRone (BUSPAR) 5 MG tablet Take 1 Tablet by mouth 2 times a day. 60 Tablet 3    Cholecalciferol (VITAMIN D3) 125 MCG (5000 UT) Cap TAKE 1 CAPSULE BY MOUTH ONCE DAILY 30 Capsule 10    allopurinol (ZYLOPRIM) 100 MG Tab TAKE 1 TABLET BY MOUTH ONCE DAILY 90 Tablet 3    levothyroxine (SYNTHROID) 50 MCG Tab TAKE 1 TABLET BY MOUTH EVERY MORNING ON AN EMPTY STOMACH 90 Tablet 3    potassium chloride SA (KDUR) 20 MEQ Tab CR Take 1 Tablet by mouth 2 times a day. 60 Tablet 3    fluticasone (FLONASE) 50 MCG/ACT nasal spray       cyanocobalamin (VITAMIN B-12) 500 MCG Tab TAKE 1 TABLET BY MOUTH ONCE DAILY 30 Tablet 10    amLODIPine (NORVASC) 2.5 MG Tab TAKE ONE TABLET BY MOUTH EVERY DAY 90 Tablet 0     No current facility-administered medications on file prior to visit.        Allergies: Niacin    Current Outpatient Medications Ordered in Epic   Medication Sig Dispense Refill    aspirin 81 MG EC tablet Take 1 Tablet by mouth every day. 100 Tablet 3    cephALEXin (KEFLEX) 500 MG Cap Take 1 Capsule by mouth 3 times a day. 5 Capsule 0    busPIRone (BUSPAR) 5 MG tablet Take 1 Tablet by mouth 2 times a day. 60 Tablet 3    Cholecalciferol (VITAMIN D3) 125 MCG (5000 UT) Cap TAKE 1 CAPSULE BY MOUTH ONCE DAILY 30 Capsule 10    allopurinol (ZYLOPRIM) 100 MG Tab TAKE 1 TABLET BY MOUTH ONCE DAILY 90 Tablet 3    levothyroxine (SYNTHROID) 50 MCG Tab TAKE 1 TABLET BY MOUTH EVERY MORNING ON AN EMPTY STOMACH 90 Tablet 3    potassium chloride SA (KDUR) 20 MEQ Tab CR Take 1 Tablet by mouth 2 times a day. 60 Tablet 3    fluticasone (FLONASE) 50 MCG/ACT nasal spray       cyanocobalamin (VITAMIN B-12) 500 MCG Tab TAKE 1 TABLET BY MOUTH ONCE DAILY 30 Tablet 10    amLODIPine (NORVASC) 2.5 MG Tab TAKE ONE TABLET BY MOUTH EVERY DAY 90 Tablet 0     No current Epic-ordered facility-administered medications on file.       Past Medical  "History:   Diagnosis Date    Anxiety 7/31/2023    Cataract     CKD (chronic kidney disease), stage III (HCC) 11/29/2017    Essential hypertension 2/19/2016    Gout of both feet 6/6/2017    Hypercalcemia 12/14/2017    Hypertriglyceridemia 4/4/2017    Hypothyroidism 2/19/2016    Irregular heart beat 10/22/2019    Stroke (HCC)     TIA at age 33       Past Surgical History:   Procedure Laterality Date    CATARACT EXTRACTION WITH IOL Bilateral 2014    EYE SURGERY      lasix     MAMMOPLASTY REDUCTION         Family History   Problem Relation Age of Onset    Hypertension Mother     Heart Disease Mother         chf    Lung Disease Father     Hypertension Sister     Hyperlipidemia Sister     Hypertension Brother        Social History     Tobacco Use   Smoking Status Never   Smokeless Tobacco Never       Social History     Substance and Sexual Activity   Alcohol Use Yes    Alcohol/week: 8.4 oz    Types: 14 Glasses of wine per week    Comment: 2 glasses of wine daily       Review of systems.  As per HPI above. All other systems reviewed and negative.      Past Medical, Social, and Family history reviewed and updated in EPIC     Objective     /62   Pulse 70   Temp 36.2 °C (97.2 °F) (Temporal)   Resp 18   Ht 1.549 m (5' 1\")   Wt 46.3 kg (102 lb 2 oz)   SpO2 99%    Body mass index is 19.3 kg/m².    General: alert in no apparent distress.  Cardiovascular: regular rate and rhythm  Pulmonary: lungs : no wheezing   Gastrointestinal: BS present. No obvious mass noted  MINImental status examination 14/30      No results found for: \"HBA1C\"    Lab Results   Component Value Date/Time    WBC 6.6 06/01/2023 02:03 PM    HEMOGLOBIN 12.3 06/01/2023 02:03 PM    HEMATOCRIT 35.9 (L) 06/01/2023 02:03 PM    .9 (H) 06/01/2023 02:03 PM    PLATELETCT 235 06/01/2023 02:03 PM         Lab Results   Component Value Date/Time    SODIUM 144 06/01/2023 02:03 PM    POTASSIUM 4.5 06/01/2023 02:03 PM    GLUCOSE 86 06/01/2023 02:03 PM    BUN " 20 06/01/2023 02:03 PM    CREATININE 0.77 06/01/2023 02:03 PM       Lab Results   Component Value Date/Time    CHOLSTRLTOT 195 04/22/2022 09:56 AM    TRIGLYCERIDE 55 04/22/2022 09:56 AM    HDL 78 04/22/2022 09:56 AM     (H) 04/22/2022 09:56 AM       Lab Results   Component Value Date/Time    ALTSGPT 18 06/01/2023 02:03 PM             Assessment and Plan     1. Moderate dementia with anxiety, unspecified dementia type (HCC)  Chronic condition.  Worsening status  The mental status examination as above.  Strongly advised the patient to follow-up with neurologist    Additionally After medical evaluation today: It is my professional opinion that this pt with Dementia and pt does not have the capacity for care for herself . And pt is not capable to manage her financial situation.  I would strongly recommend patient and family members to consult their  to assist with patient's medical care/Durable power of ,  Financial and current  living situation.  Information were discussed with patient and her friend Luba Conte who is appointed by patient's son Riki Jon with DURABLE POWER OF     2. Administrative encounter  Medical form completed for the patient today.    3. Stage 3a chronic kidney disease (HCC)  Chronic condition.  Current status unclear.  Advised the patient to avoid NSAID.  Lab test ordered for follow-up.    4. Essential hypertension  Chronic stable condition.  Continue amlodipine 2.5 Mg daily for    5. Anxiety  New condition.  Uncontrolled.  Recommend patient to start taking buspirone 5 mg p.o. twice daily.  Rx has been sent to the pharmacy.    6. Hypothyroidism, unspecified type  - TSH; Future  Chronic condition.  Current status unclear.  Lab test ordered for follow-up.  Continue with levothyroxine 50 mcg daily.    7. Gout of both feet  Chronic condition.  The patient currently asymptomatic.  Continue with allopurinol 100 mg daily.    - URIC ACID; Future    8. Dyslipidemia  Chronic  condition.  Current status unclear.  Lipid panel requested.  Recommend to continue with low-fat low-cholesterol diet.  Continue to monitor  - Lipid Profile; Future    9. Vitamin D deficiency  - VITAMIN D,25 HYDROXY (DEFICIENCY); Future  Chronic condition.  Current status unclear.  Lab test ordered for follow-up.    10. TIA (transient ischemic attack)  Chronic stable condition.  Continue aspirin 81 mg daily.        11. Need for vaccination  - INFLUENZA VACCINE, HIGH DOSE (65+ ONLY)      Addendum note     after the influenza vaccine given proximately 10 minutes later the patient came back to the clinic complaining of a small bump at the injection site.  No pain noted.    Have reexamined the left arm there is a visible small lump at the injection site measuring approximately 1.2 cm in diameter.  Soft nontender there is no redness discharge or fluctuance.  I suspect that the patient may have a small hematoma  And recommended ultrasound to be done but the patient declined as she is asymptomatic.  Advised to continue to monitor closely.  I have prescribed  Antibiotic to take for few days to prevent infection Keflex 500 mg p.o. 3 times daily for 5 days.  Recommend office follow-up approximately 1 week for for reevaluation of this condition.  Advised the patient to go to ER if fever chills redness discharge pain or any change in her condition.  Patient voiced understanding.        Attestation: I spent:   72   min -  That includes time complete medical form as noted above. Time for chart review before the visit, the actual patient visit, and time spent on documentation in EMR after the visit.  Chart review/prep, review of other providers' records, imaging/lab review, face-to-face time for history/examination, pt's counseling/education, ordering, prescribing,  review of results/meds/ treatment plan with patient, and care coordination.    The patient is of extensive complexity. This pt is at high risk for complications and  morbidity.                   Please note that this dictation was created using voice recognition software. I have made every reasonable attempt to correct obvious errors, but I expect that there are errors of grammar and possibly content that I did not discover before finalizing the note.    Terell Acuna MD  Internal Medicine  [facility]

## 2023-09-26 NOTE — ASSESSMENT & PLAN NOTE
Chronic condition.  Previous GFR in the 40 to 50s.  Advised the patient to avoid NSAID.  Continue to monitor.  Recent lab test result discussed with the patient.

## 2023-09-26 NOTE — ASSESSMENT & PLAN NOTE
This is a chronic condition.  Patient reported difficulty with short-term memory.  Patient accompanied by family friend Luba Conte  Patient reported that she was seen by neurologist Dr. Reynolds today.  I do not have the record for review.    Patient is presently staying at assisted living the [facility].    Power of  is her son Mr. Riki Silva who is unable to attend the meeting today.  He is in [place]    Patient brought in a medical form to be completed regarding her ability to manage her financial situation

## 2023-09-26 NOTE — ASSESSMENT & PLAN NOTE
Chronic condition noted when the patient in her 30s.  Patient is taking aspirin 81 mg daily.  No new symptoms reported.

## 2023-09-26 NOTE — ASSESSMENT & PLAN NOTE
Chronic condition.  Patient presently taking amlodipine 2.5 mg daily.  Blood pressure has been well controlled.

## 2023-10-03 DIAGNOSIS — F03.B4 MODERATE DEMENTIA WITH ANXIETY, UNSPECIFIED DEMENTIA TYPE (HCC): Chronic | ICD-10-CM

## 2023-10-04 RX ORDER — AMLODIPINE BESYLATE 5 MG/1
5 TABLET ORAL DAILY
Qty: 0.01 TABLET | Refills: 10 | Status: SHIPPED | OUTPATIENT
Start: 2023-10-04 | End: 2023-10-18

## 2023-10-18 RX ORDER — AMLODIPINE BESYLATE 5 MG/1
5 TABLET ORAL DAILY
Qty: 90 TABLET | Refills: 3 | Status: SHIPPED | OUTPATIENT
Start: 2023-10-18

## 2023-10-18 RX ORDER — BUSPIRONE HYDROCHLORIDE 5 MG/1
5 TABLET ORAL 2 TIMES DAILY
Qty: 60 TABLET | Refills: 3 | Status: SHIPPED | OUTPATIENT
Start: 2023-10-18

## 2024-02-16 ENCOUNTER — PATIENT MESSAGE (OUTPATIENT)
Dept: HEALTH INFORMATION MANAGEMENT | Facility: OTHER | Age: 84
End: 2024-02-16